# Patient Record
Sex: FEMALE | Race: WHITE | Employment: OTHER | URBAN - METROPOLITAN AREA
[De-identification: names, ages, dates, MRNs, and addresses within clinical notes are randomized per-mention and may not be internally consistent; named-entity substitution may affect disease eponyms.]

---

## 2017-01-09 RX ORDER — AMLODIPINE BESYLATE 5 MG/1
TABLET ORAL
Qty: 30 TAB | Refills: 0 | Status: SHIPPED | OUTPATIENT
Start: 2017-01-09 | End: 2017-02-16 | Stop reason: SDUPTHER

## 2017-01-13 ENCOUNTER — OFFICE VISIT (OUTPATIENT)
Dept: INTERNAL MEDICINE CLINIC | Age: 67
End: 2017-01-13

## 2017-01-13 VITALS
DIASTOLIC BLOOD PRESSURE: 86 MMHG | RESPIRATION RATE: 16 BRPM | WEIGHT: 129 LBS | BODY MASS INDEX: 25.32 KG/M2 | HEART RATE: 73 BPM | SYSTOLIC BLOOD PRESSURE: 160 MMHG | HEIGHT: 60 IN | OXYGEN SATURATION: 98 % | TEMPERATURE: 98 F

## 2017-01-13 DIAGNOSIS — R53.83 OTHER FATIGUE: ICD-10-CM

## 2017-01-13 DIAGNOSIS — I10 BENIGN ESSENTIAL HYPERTENSION: Primary | ICD-10-CM

## 2017-01-13 DIAGNOSIS — K12.0 APHTHOUS ULCER: ICD-10-CM

## 2017-01-13 RX ORDER — TRIAMCINOLONE ACETONIDE 1 MG/G
PASTE DENTAL 2 TIMES DAILY
Qty: 5 G | Refills: 0 | Status: SHIPPED | OUTPATIENT
Start: 2017-01-13 | End: 2017-05-16

## 2017-01-13 RX ORDER — LANOLIN ALCOHOL/MO/W.PET/CERES
5000 CREAM (GRAM) TOPICAL DAILY
COMMUNITY
End: 2019-01-21

## 2017-01-13 RX ORDER — LEVOMEFOLATE CALCIUM 15 MG
15 TABLET ORAL DAILY
COMMUNITY

## 2017-01-13 RX ORDER — MONTELUKAST SODIUM 4 MG/1
1 TABLET, CHEWABLE ORAL 2 TIMES DAILY
COMMUNITY

## 2017-01-13 RX ORDER — OMEPRAZOLE 20 MG/1
20 CAPSULE, DELAYED RELEASE ORAL DAILY
COMMUNITY
End: 2017-05-16

## 2017-01-13 RX ORDER — CYCLOBENZAPRINE HCL 10 MG
10 TABLET ORAL
COMMUNITY
End: 2017-05-16

## 2017-01-13 RX ORDER — AMLODIPINE BESYLATE 2.5 MG/1
2.5 TABLET ORAL DAILY
Qty: 30 TAB | Refills: 2 | Status: SHIPPED | OUTPATIENT
Start: 2017-01-13 | End: 2017-02-20

## 2017-01-13 NOTE — PROGRESS NOTES
Patient presents for   Chief Complaint   Patient presents with    Well Woman     4 month follow      Fall risk assessment was not indicated. Depression screening was not indicated Follow up questions were not indicated. 1. Have you been to the ER, urgent care clinic since your last visit? Hospitalized since your last visit? No    2. Have you seen or consulted any other health care providers outside of the [address] [place] [address] since your last visit? Include any pap smears or colon screening. No    Abuse screening performed no indications of abuse observed at this time. Learning assessment completed. ADL assessment completed.

## 2017-01-13 NOTE — ACP (ADVANCE CARE PLANNING)
Non-Provider Advance Care Planning (ACP) Note    Date of ACP Conversation: 1/13/2017  Persons included in Conversation: patient  Length of ACP Conversation in minutes: <15 minutes (Non-Billable)    Conversation requested by:   Provider      General ACP for ALL Patients with Decision Making Capacity:    Advance Directive Conversation with Patients who have not yet planned:  Importance of advance care planning, including choosing a healthcare agent to communicate patient's healthcare decisions if patient lost the ability to make decisions, such as after a sudden illness or accident  Reviewed healthcare agent role and authority/west  Recommended additional conversation with prospective agent      Interventions Provided:  Provided ACP educational materials:  Advance Directive Form  Recommended completion of Advance Directive after review of materials, conversation with prospective healthcare agent about (and others as needed), and readiness to complete a written plan  Referral to Provider for additional medical information/discussion

## 2017-01-13 NOTE — PROGRESS NOTES
Edwin Gutierres is a 74 y.o. female presenting today for Well Woman (4 month follow )  . HPI:  Edwin Gutierres presents to the office today for hypertension follow-up care. Patient states she is doing okay. Patient states she still has mouth ulcers. She was previously treated by ENT several months ago but states the medicine didn't work and she did not return to his office for f/u care. She denied any CP or palpitations today. Review of Systems   Constitutional: Negative for chills and fever. Respiratory: Negative for cough and shortness of breath. Cardiovascular: Negative for chest pain, palpitations and leg swelling. Gastrointestinal: Negative for abdominal pain, heartburn, nausea and vomiting. Neurological: Negative for dizziness. Allergies   Allergen Reactions    Morphine Unknown (comments)     Hallucinations  Pt. Denies allergy         Current Outpatient Prescriptions   Medication Sig Dispense Refill    omeprazole (PRILOSEC) 20 mg capsule Take 20 mg by mouth daily.  l-methylfolate (DEPLIN) 15 mg tablet Take 15 mg by mouth daily.  colestipol (COLESTID) 1 gram tablet Take 1 g by mouth two (2) times a day.  cyclobenzaprine (FLEXERIL) 10 mg tablet Take 10 mg by mouth nightly as needed for Muscle Spasm(s).  cyanocobalamin 1,000 mcg tablet Take 5,000 mcg by mouth daily.  amLODIPine (NORVASC) 2.5 mg tablet Take 1 Tab by mouth daily. 30 Tab 2    aluminum-magnesium hydroxide 200-200 mg/5 mL susp 30 mL, diphenhydrAMINE 12.5 mg/5 mL liqd 75 mg, lidocaine 2 % soln 30 mL, nystatin 100,000 unit/mL susp 3,000,000 Units oral suspension (compounded) Take 5 mL by mouth four (4) times daily. 240 mL 2    triamcinolone acetonide (KENALOG) 0.1 % dental paste by Dental route two (2) times a day. 5 g 0    amLODIPine (NORVASC) 5 mg tablet TAKE 1 TABLET BY MOUTH DAILY 30 Tab 0    LORazepam (ATIVAN) 0.5 mg tablet Take 0.5 Tabs by mouth two (2) times daily as needed.   1    azelastine (ASTELIN) 137 mcg (0.1 %) nasal spray 1 [place] by Both Nostrils route two (2) times a day. Use in each nostril as directed 1 Bottle 2    dicyclomine (BENTYL) 10 mg capsule   3    carvedilol (COREG) 12.5 mg tablet Take 1 Tab by mouth two (2) times daily (with meals). 60 Tab 5    zaleplon (SONATA) 10 mg capsule Take 1 Cap by mouth nightly. Max Daily Amount: 10 mg. Indications: SLEEP-ONSET INSOMNIA 30 Cap 2    oxyCODONE-acetaminophen (PERCOCET 10)  mg per tablet Take 1 Tab by mouth four (4) times daily as needed for Pain for up to 30 days. Due to fill 11/30/15. 120 Tab 0    MULTIVITAMIN PO Take  by mouth.  sertraline (ZOLOFT) 100 mg tablet Take 200 mg by mouth daily.  oxyCODONE-acetaminophen (PERCOCET 10)  mg per tablet Take 1 Tab by mouth four (4) times daily as needed for Pain for up to 30 days. Due to fill 10/25/16 120 Tab 0    oxyCODONE-acetaminophen (PERCOCET 10)  mg per tablet Take 1 Tab by mouth four (4) times daily as needed for Pain for up to 30 days. Due to fill 11/23/16 120 Tab 0    oxyCODONE-acetaminophen (PERCOCET 10)  mg per tablet Take 1 Tab by mouth four (4) times daily as needed for Pain for up to 30 days. Due to fill 12/22/16 120 Tab 0    acyclovir (ZOVIRAX) 400 mg tablet TK 1 T PO TID  1    oxyCODONE-acetaminophen (PERCOCET 10)  mg per tablet Take 1 Tab by mouth four (4) times daily as needed for Pain for up to 30 days.  ok to fill 04/28/16 120 Tab 0       Past Medical History   Diagnosis Date    Abdominal pain     Benign essential hypertension     Cancer (HCC)      uterine    Depressive disorder     Fatigue     Hypertension     Insomnia     NATHANAEL (obstructive sleep apnea)      does not use cpap machine    Osteoarthritis     Peptic ulcer 2012    Spinal stenosis      with chronic pain       Past Surgical History   Procedure Laterality Date    Hx appendectomy  1962    Hx milady and bso      Hx knee replacement  2005     right    Hx back surgery 1986     PHLD; L4-L5    Hx hysterectomy  1983     uterine cancer, stage 1    Hx gastric bypass  2009       Social History     Social History    Marital status:      Spouse name: N/A    Number of children: N/A    Years of education: N/A     Occupational History    Not on file. Social History Main Topics    Smoking status: Former Smoker     Packs/day: 0.25     Types: Cigarettes     Quit date: 12/1/2016    Smokeless tobacco: Never Used      Comment: 6 ciggerates per day    Alcohol use Yes      Comment: socially    Drug use: No      Comment: denies    Sexual activity: No     Other Topics Concern    Not on file     Social History Narrative       Patient does not have an advanced directive on file    Vitals:    01/13/17 0832 01/13/17 0909   BP: 148/76 160/86   Pulse: 73    Resp: 16    Temp: 98 °F (36.7 °C)    TempSrc: Tympanic    SpO2: 98%    Weight: 129 lb (58.5 kg)    Height: 5' (1.524 m)    PainSc:   5    PainLoc: Mouth        Physical Exam   Constitutional: No distress. HENT:   Head: Normocephalic. Cardiovascular: Normal rate, regular rhythm and normal heart sounds. Pulmonary/Chest: Effort normal and breath sounds normal.   Abdominal: Soft. Musculoskeletal: She exhibits no edema. Lymphadenopathy:     She has no cervical adenopathy. Neurological: She is alert. Nursing note and vitals reviewed. No visits with results within 3 Month(s) from this visit.   Latest known visit with results is:    Hospital Outpatient Visit on 06/28/2016   Component Date Value Ref Range Status    WBC 06/28/2016 7.6  4.6 - 13.2 K/uL Final    RBC 06/28/2016 4.27  4.20 - 5.30 M/uL Final    HGB 06/28/2016 13.2  12.0 - 16.0 g/dL Final    HCT 06/28/2016 41.2  35.0 - 45.0 % Final    MCV 06/28/2016 96.5  74.0 - 97.0 FL Final    MCH 06/28/2016 30.9  24.0 - 34.0 PG Final    MCHC 06/28/2016 32.0  31.0 - 37.0 g/dL Final    RDW 06/28/2016 13.5  11.6 - 14.5 % Final    PLATELET 84/75/4854 406  135 - 420 K/uL Final    MPV 06/28/2016 10.2  9.2 - 11.8 FL Final    NEUTROPHILS 06/28/2016 63  40 - 73 % Final    LYMPHOCYTES 06/28/2016 27  21 - 52 % Final    MONOCYTES 06/28/2016 8  3 - 10 % Final    EOSINOPHILS 06/28/2016 2  0 - 5 % Final    BASOPHILS 06/28/2016 0  0 - 2 % Final    ABS. NEUTROPHILS 06/28/2016 4.8  1.8 - 8.0 K/UL Final    ABS. LYMPHOCYTES 06/28/2016 2.1  0.9 - 3.6 K/UL Final    ABS. MONOCYTES 06/28/2016 0.6  0.05 - 1.2 K/UL Final    ABS. EOSINOPHILS 06/28/2016 0.2  0.0 - 0.4 K/UL Final    ABS. BASOPHILS 06/28/2016 0.0  0.0 - 0.06 K/UL Final    DF 06/28/2016 AUTOMATED    Final    Sodium 06/28/2016 141  136 - 145 mmol/L Final    Potassium 06/28/2016 4.1  3.5 - 5.5 mmol/L Final    Chloride 06/28/2016 105  100 - 108 mmol/L Final    CO2 06/28/2016 29  21 - 32 mmol/L Final    Anion gap 06/28/2016 7  3.0 - 18 mmol/L Final    Glucose 06/28/2016 102* 74 - 99 mg/dL Final    BUN 06/28/2016 12  7.0 - 18 MG/DL Final    Creatinine 06/28/2016 0.74  0.6 - 1.3 MG/DL Final    BUN/Creatinine ratio 06/28/2016 16  12 - 20   Final    GFR est AA 06/28/2016 >60  >60 ml/min/1.73m2 Final    GFR est non-AA 06/28/2016 >60  >60 ml/min/1.73m2 Final    Comment: (NOTE)  Estimated GFR is calculated using the Modification of Diet in Renal   Disease (MDRD) Study equation, reported for both  Americans   (GFRAA) and non- Americans (GFRNA), and normalized to 1.73m2   body surface area. The physician must decide which value applies to   the patient. The MDRD study equation should only be used in   individuals age 25 or older. It has not been validated for the   following: pregnant women, patients with serious comorbid conditions,   or on certain medications, or persons with extremes of body size,   muscle mass, or nutritional status.       Calcium 06/28/2016 8.5  8.5 - 10.1 MG/DL Final    Bilirubin, total 06/28/2016 0.4  0.2 - 1.0 MG/DL Final    ALT 06/28/2016 17  13 - 56 U/L Final    AST 06/28/2016 18  15 - 37 U/L Final    Alk. phosphatase 06/28/2016 71  45 - 117 U/L Final    Protein, total 06/28/2016 6.4  6.4 - 8.2 g/dL Final    Albumin 06/28/2016 3.5  3.4 - 5.0 g/dL Final    Globulin 06/28/2016 2.9  2.0 - 4.0 g/dL Final    A-G Ratio 06/28/2016 1.2  0.8 - 1.7   Final    TSH 06/28/2016 0.81  0.36 - 3.74 uIU/mL Final    LIPID PROFILE 06/28/2016        Final    Cholesterol, total 06/28/2016 177  <200 MG/DL Final    Triglyceride 06/28/2016 109  <150 MG/DL Final    HDL Cholesterol 06/28/2016 77* 40 - 60 MG/DL Final    LDL, calculated 06/28/2016 78.2  0 - 100 MG/DL Final    VLDL, calculated 06/28/2016 21.8  MG/DL Final    CHOL/HDL Ratio 06/28/2016 2.3  0 - 5.0   Final       .No results found for any visits on 01/13/17. Assessment / Plan:      ICD-10-CM ICD-9-CM    1. Benign essential hypertension D60 270.1 METABOLIC PANEL, COMPREHENSIVE      LIPID PANEL      CBC WITH AUTOMATED DIFF      amLODIPine (NORVASC) 2.5 mg tablet   2. Other fatigue U63.97 066.65 METABOLIC PANEL, COMPREHENSIVE      LIPID PANEL      CBC WITH AUTOMATED DIFF   3. Aphthous ulcer K12.0 528.2 triamcinolone acetonide (KENALOG) 0.1 % dental paste       Labs today  Triamcinolone mouth paste  Norvasc increased to 7.5 mg daily (b/p check in 3-4 weeks)  Fatigue with check CBC today  F/u prn  OV in 6 months      Follow-up Disposition:  Return in about 6 months (around 7/13/2017) for b/p check in 3-4 weeks. I asked the patient if she  had any questions and answered her  questions.   The patient stated that she understands the treatment plan and agrees with the treatment plan

## 2017-01-13 NOTE — MR AVS SNAPSHOT
Visit Information Date & Time Provider Department Dept. Phone Encounter #  
 1/13/2017  8:30 AM Aleksey Ellis NP [facility] INTERNAL MEDICINE [facility] [phone] [id number] Your Appointments 1/19/2017  8:20 AM  
Follow Up with ELI Agosto [facility] (MARIE SCHEDULING) Appt Note: return in 3 months [address] [id number]  
[phone] Lizeth Sneed [address] [id number] Upcoming Health Maintenance Date Due Hepatitis C Screening 1950 DTaP/Tdap/Td series (1 - Tdap) 5/28/1969 BREAST CANCER SCRN MAMMOGRAM 5/28/1998 ZOSTER VACCINE AGE 60> 5/28/2008 GLAUCOMA SCREENING Q2Y 5/28/2013 OSTEOPOROSIS SCREENING (DEXA) 5/28/2013 Pneumococcal 65+ Low/Medium Risk (2 of 2 - PPSV23) 9/12/2017 MEDICARE YEARLY EXAM 9/13/2017 COLONOSCOPY 4/26/2019 Allergies as of 1/13/2017  Review Complete On: 1/13/2017 By: Aleksey Ellis NP Severity Noted Reaction Type Reactions Morphine    Unknown (comments) Hallucinations Pt. Denies allergy Current Immunizations  Reviewed on 9/12/2016 Name Date Influenza High Dose Vaccine PF 9/12/2016 Pneumococcal Conjugate (PCV-13) 9/12/2016 Not reviewed this visit You Were Diagnosed With   
  
 Codes Comments Benign essential hypertension    -  Primary ICD-10-CM: I10 
ICD-9-CM: 401.1 Other fatigue     ICD-10-CM: R53.83 ICD-9-CM: 780.79 Aphthous ulcer     ICD-10-CM: K12.0 ICD-9-CM: 528.2 Vitals BP Pulse Temp Resp Height(growth percentile) Weight(growth percentile) 160/86 73 98 °F (36.7 °C) (Tympanic) 16 5' (1.524 m) 129 lb (58.5 kg) SpO2 BMI OB Status Smoking Status 98% 25.19 kg/m2 Hysterectomy Former Smoker Vitals History BMI and BSA Data Body Mass Index Body Surface Area  
 25.19 kg/m 2 1.57 m 2 Preferred Pharmacy Pharmacy Name Phone [facility]resa [facility] [address] [facility] [phone] Your Updated Medication List  
  
   
This list is accurate as of: 1/13/17  9:29 AM.  Always use your most recent med list.  
  
  
  
  
 acyclovir 400 mg tablet Commonly known as:  ZOVIRAX TK 1 T PO TID  
  
 aluminum-magnesium hydroxide 200-200 mg/5 mL susp 30 mL, diphenhydrAMINE 12.5 mg/5 mL liqd 75 mg, lidocaine 2 % soln 30 mL, nystatin 100,000 unit/mL susp 3,000,000 Units oral suspension (compounded) Take 5 mL by mouth four (4) times daily. * amLODIPine 5 mg tablet Commonly known as:  Tejon Citron TAKE 1 TABLET BY MOUTH DAILY  
  
 * amLODIPine 2.5 mg tablet Commonly known as:  Tejon Citron Take 1 Tab by mouth daily. azelastine 137 mcg (0.1 %) nasal spray Commonly known as:  ASTELIN  
1 Spray by Both Nostrils route two (2) times a day. Use in each nostril as directed  
  
 carvedilol 12.5 mg tablet Commonly known as:  Gianna Golas Take 1 Tab by mouth two (2) times daily (with meals). colestipol 1 gram tablet Commonly known as:  COLESTID Take 1 g by mouth two (2) times a day. cyanocobalamin 1,000 mcg tablet Take 5,000 mcg by mouth daily. cyclobenzaprine 10 mg tablet Commonly known as:  FLEXERIL Take 10 mg by mouth nightly as needed for Muscle Spasm(s). dicyclomine 10 mg capsule Commonly known as:  BENTYL  
  
 l-methylfolate 15 mg tablet Commonly known as:  [place] Take 15 mg by mouth daily. LORazepam 0.5 mg tablet Commonly known as:  ATIVAN Take 0.5 Tabs by mouth two (2) times daily as needed. MULTIVITAMIN PO Take  by mouth. omeprazole 20 mg capsule Commonly known as:  PRILOSEC Take 20 mg by mouth daily. * oxyCODONE-acetaminophen  mg per tablet Commonly known as:  PERCOCET 10 Take 1 Tab by mouth four (4) times daily as needed for Pain for up to 30 days. Due to fill 11/30/15. * oxyCODONE-acetaminophen  mg per tablet Commonly known as:  PERCOCET 10 Take 1 Tab by mouth four (4) times daily as needed for Pain for up to 30 days. ok to fill 04/28/16 * oxyCODONE-acetaminophen  mg per tablet Commonly known as:  PERCOCET 10 Take 1 Tab by mouth four (4) times daily as needed for Pain for up to 30 days. Due to fill 10/25/16 * oxyCODONE-acetaminophen  mg per tablet Commonly known as:  PERCOCET 10 Take 1 Tab by mouth four (4) times daily as needed for Pain for up to 30 days. Due to fill 11/23/16 * oxyCODONE-acetaminophen  mg per tablet Commonly known as:  PERCOCET 10 Take 1 Tab by mouth four (4) times daily as needed for Pain for up to 30 days. Due to fill 12/22/16  
  
 triamcinolone acetonide 0.1 % dental paste Commonly known as:  KENALOG  
by Dental route two (2) times a day. zaleplon 10 mg capsule Commonly known as:  SONATA Take 1 Cap by mouth nightly. Max Daily Amount: 10 mg. Indications: SLEEP-ONSET INSOMNIA  
  
 ZOLOFT 100 mg tablet Generic drug:  sertraline Take 200 mg by mouth daily. * Notice: This list has 7 medication(s) that are the same as other medications prescribed for you. Read the directions carefully, and ask your doctor or other care provider to review them with you. Prescriptions Printed Refills  
 aluminum-magnesium hydroxide 200-200 mg/5 mL susp 30 mL, diphenhydrAMINE 12.5 mg/5 mL liqd 75 mg, lidocaine 2 % soln 30 mL, nystatin 100,000 unit/mL susp 3,000,000 Units oral suspension (compounded) 2 Sig: Take 5 mL by mouth four (4) times daily. Class: Print Route: Oral  
  
Prescriptions Sent to Pharmacy Refills  
 amLODIPine (NORVASC) 2.5 mg tablet 2 Sig: Take 1 Tab by mouth daily. Class: Normal  
 Pharmacy: [facility] [address] [place], [place] [address]Tresa [address] [place] [address] #: [phone]  Route: Oral  
 triamcinolone acetonide (KENALOG) 0.1 % dental paste 0 Sig: by Dental route two (2) times a day. Class: Normal  
 Pharmacy: [facility] [address] [place] [address] Tresa Salamanca [address] [place] [address] #: [phone] Route: Dental  
  
To-Do List   
 01/13/2017 Lab:  CBC WITH AUTOMATED DIFF   
  
 01/13/2017 Lab:  LIPID PANEL   
  
 01/13/2017 Lab:  METABOLIC PANEL, COMPREHENSIVE Introducing \[facility]\"" & HEALTH SERVICES! José Kisha introduces [employer] patient portal. Now you can access parts of your medical record, email your doctor's office, and request medication refills online. 1. In your internet browser, go to [email or web address] Pitch 2. Click on the First Time User? Click Here link in the Sign In box. You will see the New Member Sign Up page. 3. Enter your [employer] Access Code exactly as it appears below. You will not need to use this code after youve completed the sign-up process. If you do not sign up before the expiration date, you must request a new code. · [employer] Access Code: [email or web address] 
Expires: 4/13/2017  9:29 AM 
 
4. Enter the last four digits of your Social Security Number (xxxx) and Date of Birth (mm/dd/yyyy) as indicated and click Submit. You will be taken to the next sign-up page. 5. Create a [employer] ID. This will be your [employer] login ID and cannot be changed, so think of one that is secure and easy to remember. 6. Create a [employer] password. You can change your password at any time. 7. Enter your Password Reset Question and Answer. This can be used at a later time if you forget your password. 8. Enter your e-mail address. You will receive e-mail notification when new information is available in [address] 9. Click Sign Up. You can now view and download portions of your medical record. 10. Click the Download Summary menu link to download a portable copy of your medical information. If you have questions, please visit the Frequently Asked Questions section of the [employer] website. Remember, [employer] is NOT to be used for urgent needs. For medical emergencies, dial 911. Now available from your iPhone and Android! Please provide this summary of care documentation to your next provider. Your primary care clinician is listed as Tiago Terry. If you have any questions after today's visit, please call [phone].

## 2017-01-19 ENCOUNTER — OFFICE VISIT (OUTPATIENT)
Dept: PAIN MANAGEMENT | Age: 67
End: 2017-01-19

## 2017-01-19 VITALS
HEIGHT: 60 IN | HEART RATE: 70 BPM | BODY MASS INDEX: 25.32 KG/M2 | DIASTOLIC BLOOD PRESSURE: 81 MMHG | SYSTOLIC BLOOD PRESSURE: 168 MMHG | WEIGHT: 129 LBS

## 2017-01-19 DIAGNOSIS — M54.2 NECK PAIN: ICD-10-CM

## 2017-01-19 DIAGNOSIS — F51.04 CHRONIC INSOMNIA: ICD-10-CM

## 2017-01-19 DIAGNOSIS — G89.29 INSOMNIA SECONDARY TO CHRONIC PAIN: ICD-10-CM

## 2017-01-19 DIAGNOSIS — K58.0 IRRITABLE BOWEL SYNDROME WITH DIARRHEA: ICD-10-CM

## 2017-01-19 DIAGNOSIS — K12.1 MOUTH ULCERS: ICD-10-CM

## 2017-01-19 DIAGNOSIS — M50.30 DEGENERATION OF CERVICAL INTERVERTEBRAL DISC: ICD-10-CM

## 2017-01-19 DIAGNOSIS — G89.29 CHRONIC LEFT-SIDED LOW BACK PAIN WITHOUT SCIATICA: ICD-10-CM

## 2017-01-19 DIAGNOSIS — M25.552 PAIN IN JOINT, PELVIC REGION AND THIGH, LEFT: ICD-10-CM

## 2017-01-19 DIAGNOSIS — M70.62 TROCHANTERIC BURSITIS OF LEFT HIP: ICD-10-CM

## 2017-01-19 DIAGNOSIS — M51.37 DEGENERATION OF LUMBAR OR LUMBOSACRAL INTERVERTEBRAL DISC: ICD-10-CM

## 2017-01-19 DIAGNOSIS — M54.50 CHRONIC LEFT-SIDED LOW BACK PAIN WITHOUT SCIATICA: ICD-10-CM

## 2017-01-19 DIAGNOSIS — F41.8 DEPRESSION WITH ANXIETY: ICD-10-CM

## 2017-01-19 DIAGNOSIS — G89.4 CHRONIC PAIN SYNDROME: Primary | ICD-10-CM

## 2017-01-19 DIAGNOSIS — G47.01 INSOMNIA SECONDARY TO CHRONIC PAIN: ICD-10-CM

## 2017-01-19 DIAGNOSIS — M54.2 CERVICALGIA: ICD-10-CM

## 2017-01-19 DIAGNOSIS — Z79.899 ENCOUNTER FOR LONG-TERM (CURRENT) USE OF HIGH-RISK MEDICATION: ICD-10-CM

## 2017-01-19 DIAGNOSIS — M96.1 POSTLAMINECTOMY SYNDROME, LUMBAR REGION: ICD-10-CM

## 2017-01-19 LAB
ALCOHOL UR POC: NORMAL
AMPHETAMINES UR POC: NORMAL
BARBITURATES UR POC: NORMAL
BENZODIAZEPINES UR POC: NORMAL
BUPRENORPHINE UR POC: NORMAL
CANNABINOIDS UR POC: NORMAL
CARISOPRODOL UR POC: NORMAL
COCAINE UR POC: NORMAL
FENTANYL UR POC: NORMAL
MDMA/ECSTASY UR POC: NORMAL
METHADONE UR POC: NORMAL
METHAMPHETAMINE UR POC: NORMAL
METHYLPHENIDATE UR POC: NORMAL
OPIATES UR POC: NORMAL
OXYCODONE UR POC: NORMAL
PHENCYCLIDINE UR POC: NORMAL
PROPOXYPHENE UR POC: NORMAL
TRAMADOL UR POC: NORMAL
TRICYCLICS UR POC: NORMAL

## 2017-01-19 RX ORDER — OXYCODONE AND ACETAMINOPHEN 10; 325 MG/1; MG/1
1 TABLET ORAL
Qty: 120 TAB | Refills: 0 | Status: SHIPPED | OUTPATIENT
Start: 2017-01-19 | End: 2017-02-22

## 2017-01-19 RX ORDER — CARVEDILOL 12.5 MG/1
TABLET ORAL
Qty: 60 TAB | Refills: 0 | Status: SHIPPED | OUTPATIENT
Start: 2017-01-19 | End: 2017-02-23 | Stop reason: SDUPTHER

## 2017-01-19 NOTE — MR AVS SNAPSHOT
Visit Information Date & Time Provider Department Dept. Phone Encounter #  
 1/19/2017  8:20 AM Emmanuel Brian, [address] for Pain Management [phone] [id number] Follow-up Instructions Return in about 3 months (around 4/19/2017). Your Appointments 5/12/2017  8:30 AM  
Follow Up with Jazzmine Barroso NP  
[facility] INTERNAL MEDICINE OF [place] ([facility]) Appt Note: 4 mos [address], [address]  
  
   
 [address], [address] [place] [address] Upcoming Health Maintenance Date Due Hepatitis C Screening 1950 DTaP/Tdap/Td series (1 - Tdap) 5/28/1969 BREAST CANCER SCRN MAMMOGRAM 5/28/1998 ZOSTER VACCINE AGE 60> 5/28/2008 GLAUCOMA SCREENING Q2Y 5/28/2013 OSTEOPOROSIS SCREENING (DEXA) 5/28/2013 Pneumococcal 65+ Low/Medium Risk (2 of 2 - PPSV23) 9/12/2017 MEDICARE YEARLY EXAM 9/13/2017 COLONOSCOPY 4/26/2019 Allergies as of 1/19/2017  Review Complete On: 1/19/2017 By: ELI Felder Severity Noted Reaction Type Reactions Morphine    Unknown (comments) Hallucinations Pt. Denies allergy Current Immunizations  Reviewed on 9/12/2016 Name Date Influenza High Dose Vaccine PF 9/12/2016 Pneumococcal Conjugate (PCV-13) 9/12/2016 Not reviewed this visit You Were Diagnosed With   
  
 Codes Comments Chronic pain syndrome    -  Primary ICD-10-CM: G89.4 ICD-9-CM: 338.4 Encounter for long-term (current) use of high-risk medication     ICD-10-CM: Z79.899 ICD-9-CM: V58.69 Postlaminectomy syndrome, lumbar region     ICD-10-CM: M96.1 ICD-9-CM: 722.83 Degeneration of lumbar or lumbosacral intervertebral disc     ICD-10-CM: M51.37 
ICD-9-CM: 722.52 Degeneration of cervical intervertebral disc     ICD-10-CM: M50.30 ICD-9-CM: 722.4  Insomnia secondary to chronic pain     ICD-10-CM: G89.29, G47.01 
 ICD-9-CM: 338.29, 327.01 Cervicalgia     ICD-10-CM: M54.2 ICD-9-CM: 723.1 Mouth ulcers     ICD-10-CM: K12.1 ICD-9-CM: 820. 9 Chronic left-sided low back pain without sciatica     ICD-10-CM: M54.5, G89.29 ICD-9-CM: 724.2, 338.29 Irritable bowel syndrome with diarrhea     ICD-10-CM: K58.0 ICD-9-CM: 936.7 Pain in joint, pelvic region and thigh, left     ICD-10-CM: M25.552 ICD-9-CM: 719.45 Trochanteric bursitis of left hip     ICD-10-CM: M70.62 ICD-9-CM: 726.5 Neck pain     ICD-10-CM: M54.2 ICD-9-CM: 723.1 Chronic insomnia     ICD-10-CM: F51.04 
ICD-9-CM: 780.52 Depression with anxiety     ICD-10-CM: F41.8 ICD-9-CM: 300.4 Vitals BP Pulse Height(growth percentile) Weight(growth percentile) BMI OB Status 168/81 70 5' (1.524 m) 129 lb (58.5 kg) 25.19 kg/m2 Hysterectomy Smoking Status Former Smoker Vitals History BMI and BSA Data Body Mass Index Body Surface Area  
 25.19 kg/m 2 1.57 m 2 Preferred Pharmacy Pharmacy Name Phone [facility] [address] [place] [address] Your Updated Medication List  
  
   
This list is accurate as of: 1/19/17  9:08 AM.  Always use your most recent med list.  
  
  
  
  
 acyclovir 400 mg tablet Commonly known as:  ZOVIRAX TK 1 T PO TID  
  
 aluminum-magnesium hydroxide 200-200 mg/5 mL susp 30 mL, diphenhydrAMINE 12.5 mg/5 mL liqd 75 mg, lidocaine 2 % soln 30 mL, nystatin 100,000 unit/mL susp 3,000,000 Units oral suspension (compounded) Take 5 mL by mouth four (4) times daily. * amLODIPine 5 mg tablet Commonly known as:  Lalit Alstrom TAKE 1 TABLET BY MOUTH DAILY  
  
 * amLODIPine 2.5 mg tablet Commonly known as:  Lalit Alstrom Take 1 Tab by mouth daily. azelastine 137 mcg (0.1 %) nasal spray Commonly known as:  ASTELIN  
1 Spray by Both Nostrils route two (2) times a day.  Use in each nostril as directed  
  
 carvedilol 12.5 mg tablet Commonly known as:  COREG  
TAKE 1 TABLET BY MOUTH TWICE DAILY WITH MEALS  
  
 colestipol 1 gram tablet Commonly known as:  COLESTID Take 1 g by mouth two (2) times a day. cyanocobalamin 1,000 mcg tablet Take 5,000 mcg by mouth daily. cyclobenzaprine 10 mg tablet Commonly known as:  FLEXERIL Take 10 mg by mouth nightly as needed for Muscle Spasm(s). dicyclomine 10 mg capsule Commonly known as:  BENTYL  
  
 l-methylfolate 15 mg tablet Commonly known as:  Viva Kast Take 15 mg by mouth daily. LORazepam 0.5 mg tablet Commonly known as:  ATIVAN Take 0.5 Tabs by mouth two (2) times daily as needed. MULTIVITAMIN PO Take  by mouth. omeprazole 20 mg capsule Commonly known as:  PRILOSEC Take 20 mg by mouth daily. * oxyCODONE-acetaminophen  mg per tablet Commonly known as:  PERCOCET 10 Take 1 Tab by mouth four (4) times daily as needed for Pain for up to 30 days. Due to fill 11/30/15. * oxyCODONE-acetaminophen  mg per tablet Commonly known as:  PERCOCET 10 Take 1 Tab by mouth four (4) times daily as needed for Pain for up to 30 days. ok to fill 04/28/16 * oxyCODONE-acetaminophen  mg per tablet Commonly known as:  PERCOCET 10 Take 1 Tab by mouth four (4) times daily as needed for Pain for up to 30 days. Due to fill 10/25/16 * oxyCODONE-acetaminophen  mg per tablet Commonly known as:  PERCOCET 10 Take 1 Tab by mouth four (4) times daily as needed for Pain for up to 30 days. Due to fill 11/23/16 * oxyCODONE-acetaminophen  mg per tablet Commonly known as:  PERCOCET 10 Take 1 Tab by mouth four (4) times daily as needed for Pain for up to 30 days. Due to fill 3/19/17 * oxyCODONE-acetaminophen  mg per tablet Commonly known as:  PERCOCET 10 Take 1 Tab by mouth four (4) times daily as needed for Pain for up to 30 days. OK to fill 2/19/17 * oxyCODONE-acetaminophen  mg per tablet Commonly known as:  PERCOCET 10 Take 1 Tab by mouth four (4) times daily as needed for Pain for up to 30 days. Due to fill 1/21/17  
  
 triamcinolone acetonide 0.1 % dental paste Commonly known as:  KENALOG  
by Dental route two (2) times a day. zaleplon 10 mg capsule Commonly known as:  SONATA Take 1 Cap by mouth nightly. Max Daily Amount: 10 mg. Indications: SLEEP-ONSET INSOMNIA  
  
 ZOLOFT 100 mg tablet Generic drug:  sertraline Take 200 mg by mouth daily. * Notice: This list has 9 medication(s) that are the same as other medications prescribed for you. Read the directions carefully, and ask your doctor or other care provider to review them with you. Prescriptions Printed Refills  
 oxyCODONE-acetaminophen (PERCOCET 10)  mg per tablet 0 Sig: Take 1 Tab by mouth four (4) times daily as needed for Pain for up to 30 days. Due to fill 3/19/17 Class: Print Route: Oral  
 oxyCODONE-acetaminophen (PERCOCET 10)  mg per tablet 0 Sig: Take 1 Tab by mouth four (4) times daily as needed for Pain for up to 30 days. OK to fill 2/19/17 Class: Print Route: Oral  
 oxyCODONE-acetaminophen (PERCOCET 10)  mg per tablet 0 Sig: Take 1 Tab by mouth four (4) times daily as needed for Pain for up to 30 days. Due to fill 1/21/17 Class: Print Route: Oral  
  
We Performed the Following AMB POC DRUG SCREEN () [ [facility]] DRUG SCREEN [BUP70683 Custom] Follow-up Instructions Return in about 3 months (around 4/19/2017). Patient Instructions 1. Continue medications as prescribed 2. Follow up in three months Introducing [facility]! [facility] introduces [employer] patient portal. Now you can access parts of your medical record, email your doctor's office, and request medication refills online.    
 
1. In your internet browser, go to [email or web address] 2. Click on the First Time User? Click Here link in the Sign In box. You will see the New Member Sign Up page. 3. Enter your [employer] Access Code exactly as it appears below. You will not need to use this code after youve completed the sign-up process. If you do not sign up before the expiration date, you must request a new code. · [employer] Access Code: [email or web address] 
Expires: 4/13/2017  9:29 AM 
 
4. Enter the last four digits of your Social Security Number (xxxx) and Date of Birth (mm/dd/yyyy) as indicated and click Submit. You will be taken to the next sign-up page. 5. Create a eNeura Therapeuticst ID. This will be your [employer] login ID and cannot be changed, so think of one that is secure and easy to remember. 6. Create a [employer] password. You can change your password at any time. 7. Enter your Password Reset Question and Answer. This can be used at a later time if you forget your password. 8. Enter your e-mail address. You will receive e-mail notification when new information is available in [address] 9. Click Sign Up. You can now view and download portions of your medical record. 10. Click the Download Summary menu link to download a portable copy of your medical information. If you have questions, please visit the Frequently Asked Questions section of the [employer] website. Remember, [employer] is NOT to be used for urgent needs. For medical emergencies, dial 911. Now available from your iPhone and Android! Please provide this summary of care documentation to your next provider. Your primary care clinician is listed as Cecilia Ta. If you have any questions after today's visit, please call [phone].

## 2017-01-19 NOTE — PROGRESS NOTES
Nursing Notes    Patient presents to the office today in follow-up. Patient rates her pain at 6/10 on the numerical pain scale. Reviewed medications with counts as follows:    Rx Date filled Qty Dispensed Pill Count Last Dose Short   Percocet 10/325 mg 12/23/16 120 44 yesterday no                                   POC UDS was performed in office today. Any new labs or imaging since last appointment? NO    Have you been to an emergency room (ER) or urgent care clinic since your last visit? NO            Have you been hospitalized since your last visit? NO     If yes, where, when, and reason for visit? Have you seen or consulted any other health care providers outside of the [address] [place] [address]  since your last visit? NO     If yes, where, when, and reason for visit? HM deferred to pcp.

## 2017-02-16 RX ORDER — AMLODIPINE BESYLATE 5 MG/1
TABLET ORAL
Qty: 90 TAB | Refills: 0 | Status: SHIPPED | OUTPATIENT
Start: 2017-02-16 | End: 2017-05-15 | Stop reason: SDUPTHER

## 2017-02-20 ENCOUNTER — HOSPITAL ENCOUNTER (OUTPATIENT)
Dept: CT IMAGING | Age: 67
Discharge: HOME | End: 2017-02-20
Attending: NURSE PRACTITIONER
Payer: MEDICARE

## 2017-02-20 ENCOUNTER — OFFICE VISIT (OUTPATIENT)
Dept: INTERNAL MEDICINE CLINIC | Age: 67
End: 2017-02-20

## 2017-02-20 VITALS
HEIGHT: 60 IN | OXYGEN SATURATION: 98 % | DIASTOLIC BLOOD PRESSURE: 90 MMHG | RESPIRATION RATE: 16 BRPM | BODY MASS INDEX: 25.13 KG/M2 | TEMPERATURE: 99 F | WEIGHT: 128 LBS | SYSTOLIC BLOOD PRESSURE: 182 MMHG | HEART RATE: 68 BPM

## 2017-02-20 DIAGNOSIS — R10.32 LLQ PAIN: ICD-10-CM

## 2017-02-20 DIAGNOSIS — R10.84 GENERALIZED ABDOMINAL PAIN: ICD-10-CM

## 2017-02-20 DIAGNOSIS — I10 BENIGN ESSENTIAL HYPERTENSION: ICD-10-CM

## 2017-02-20 DIAGNOSIS — K57.92 DIVERTICULITIS OF INTESTINE WITHOUT PERFORATION OR ABSCESS WITHOUT BLEEDING, UNSPECIFIED PART OF INTESTINAL TRACT: Primary | ICD-10-CM

## 2017-02-20 LAB
BILIRUB UR QL STRIP: NORMAL
CREAT UR-MCNC: 0.7 MG/DL (ref 0.6–1.3)
GLUCOSE UR-MCNC: NORMAL MG/DL
KETONES P FAST UR STRIP-MCNC: NORMAL MG/DL
PH UR STRIP: 5.5 [PH] (ref 4.6–8)
PROT UR QL STRIP: NORMAL MG/DL
SP GR UR STRIP: 1.03 (ref 1–1.03)
UA UROBILINOGEN AMB POC: NORMAL (ref 0.2–1)
URINALYSIS CLARITY POC: CLEAR
URINALYSIS COLOR POC: NORMAL
URINE BLOOD POC: NEGATIVE
URINE LEUKOCYTES POC: NEGATIVE
URINE NITRITES POC: NEGATIVE

## 2017-02-20 PROCEDURE — 82565 ASSAY OF CREATININE: CPT

## 2017-02-20 PROCEDURE — 74177 CT ABD & PELVIS W/CONTRAST: CPT

## 2017-02-20 PROCEDURE — 74011636320 HC RX REV CODE- 636/320: Performed by: NURSE PRACTITIONER

## 2017-02-20 RX ORDER — CLONIDINE HYDROCHLORIDE 0.1 MG/1
0.1 TABLET ORAL 2 TIMES DAILY
Qty: 60 TAB | Refills: 0 | Status: SHIPPED | OUTPATIENT
Start: 2017-02-20 | End: 2017-02-20 | Stop reason: SDUPTHER

## 2017-02-20 RX ORDER — CLONIDINE HYDROCHLORIDE 0.1 MG/1
TABLET ORAL
Qty: 180 TAB | Refills: 0 | Status: SHIPPED | OUTPATIENT
Start: 2017-02-20 | End: 2017-06-17 | Stop reason: SDUPTHER

## 2017-02-20 RX ORDER — METRONIDAZOLE 500 MG/1
500 TABLET ORAL 3 TIMES DAILY
Qty: 30 TAB | Refills: 0 | Status: SHIPPED | OUTPATIENT
Start: 2017-02-20 | End: 2017-03-02

## 2017-02-20 RX ORDER — CIPROFLOXACIN 500 MG/1
500 TABLET ORAL 2 TIMES DAILY
Qty: 20 TAB | Refills: 0 | Status: SHIPPED | OUTPATIENT
Start: 2017-02-20 | End: 2017-03-02

## 2017-02-20 RX ADMIN — IOPAMIDOL 100 ML: 612 INJECTION, SOLUTION INTRAVENOUS at 17:59

## 2017-02-20 RX ADMIN — DIATRIZOATE MEGLUMINE AND DIATRIZOATE SODIUM 30 ML: 600; 100 SOLUTION ORAL; RECTAL at 17:59

## 2017-02-20 NOTE — MR AVS SNAPSHOT
Visit Information Date & Time Provider Department Dept. Phone Encounter #  
 2/20/2017  2:15 PM Carmel Crzu NP [facility] INTERNAL MEDICINE OF [address] [id number] Follow-up Instructions Return in about 2 weeks (around 3/6/2017). Your Appointments 4/13/2017  8:20 AM  
Follow Up with ELI Piña [facility] for Pain Management (MARIE SCHEDULING) Appt Note: return in 3 months [address] [facility] [address]  
[phone]  [phone] 5/12/2017  8:30 AM  
Follow Up with Carmel Cruz NP  
[facility] INTERNAL MEDICINE OF [place] ([facility]) Appt Note: 4 mos [address] [facility] [facility] [address]  
  
   
 [address] [facility], [address] [facility] [address] Upcoming Health Maintenance Date Due Hepatitis C Screening 1950 DTaP/Tdap/Td series (1 - Tdap) 5/28/1969 BREAST CANCER SCRN MAMMOGRAM 5/28/1998 ZOSTER VACCINE AGE 60> 5/28/2008 GLAUCOMA SCREENING Q2Y 5/28/2013 OSTEOPOROSIS SCREENING (DEXA) 5/28/2013 Pneumococcal 65+ Low/Medium Risk (2 of 2 - PPSV23) 9/12/2017 MEDICARE YEARLY EXAM 9/13/2017 COLONOSCOPY 4/26/2019 Allergies as of 2/20/2017  Review Complete On: 2/20/2017 By: Carmel Cruz NP Severity Noted Reaction Type Reactions Morphine    Unknown (comments) Hallucinations Pt. Denies allergy Current Immunizations  Reviewed on 9/12/2016 Name Date Influenza High Dose Vaccine PF 9/12/2016 Pneumococcal Conjugate (PCV-13) 9/12/2016 Not reviewed this visit You Were Diagnosed With   
  
 Codes Comments Diverticulitis of intestine without perforation or abscess without bleeding, unspecified part of intestinal tract    -  Primary ICD-10-CM: K57.92 
ICD-9-CM: 562.11 Generalized abdominal pain     ICD-10-CM: R10.84 ICD-9-CM: 789.07   
 Benign essential hypertension     ICD-10-CM: I10 
ICD-9-CM: 401.1 LLQ pain     ICD-10-CM: R10.32 
ICD-9-CM: 789.04 Vitals BP Pulse Temp Resp Height(growth percentile) Weight(growth percentile) 182/90 (BP 1 Location: Left arm, BP Patient Position: Sitting) 68 99 °F (37.2 °C) (Tympanic) 16 5' (1.524 m) 128 lb (58.1 kg) SpO2 BMI OB Status Smoking Status 98% 25 kg/m2 Hysterectomy Former Smoker BMI and BSA Data Body Mass Index Body Surface Area  
 25 kg/m 2 1.57 m 2 Preferred Pharmacy Pharmacy Name Phone [facility] [address] [place] [address] [phone] Your Updated Medication List  
  
   
This list is accurate as of: 2/20/17  3:04 PM.  Always use your most recent med list.  
  
  
  
  
 acyclovir 400 mg tablet Commonly known as:  ZOVIRAX TK 1 T PO TID  
  
 aluminum-magnesium hydroxide 200-200 mg/5 mL susp 30 mL, diphenhydrAMINE 12.5 mg/5 mL liqd 75 mg, lidocaine 2 % soln 30 mL, nystatin 100,000 unit/mL susp 3,000,000 Units oral suspension (compounded) Take 5 mL by mouth four (4) times daily. amLODIPine 5 mg tablet Commonly known as:  Adeline Colon TAKE 1 TABLET BY MOUTH DAILY  
  
 azelastine 137 mcg (0.1 %) nasal spray Commonly known as:  ASTELIN  
1 Spray by Both Nostrils route two (2) times a day. Use in each nostril as directed  
  
 carvedilol 12.5 mg tablet Commonly known as:  COREG  
TAKE 1 TABLET BY MOUTH TWICE DAILY WITH MEALS  
  
 ciprofloxacin HCl 500 mg tablet Commonly known as:  CIPRO Take 1 Tab by mouth two (2) times a day for 10 days. cloNIDine HCl 0.1 mg tablet Commonly known as:  CATAPRES Take 1 Tab by mouth two (2) times a day. colestipol 1 gram tablet Commonly known as:  COLESTID Take 1 g by mouth two (2) times a day. cyanocobalamin 1,000 mcg tablet Take 5,000 mcg by mouth daily. cyclobenzaprine 10 mg tablet Commonly known as:  FLEXERIL Take 10 mg by mouth nightly as needed for Muscle Spasm(s). dicyclomine 10 mg capsule Commonly known as:  BENTYL  
  
 l-methylfolate 15 mg tablet Commonly known as:  Cecile Simmers Take 15 mg by mouth daily. LORazepam 0.5 mg tablet Commonly known as:  ATIVAN Take 0.5 Tabs by mouth two (2) times daily as needed. metroNIDAZOLE 500 mg tablet Commonly known as:  FLAGYL Take 1 Tab by mouth three (3) times daily for 10 days. MULTIVITAMIN PO Take  by mouth. omeprazole 20 mg capsule Commonly known as:  PRILOSEC Take 20 mg by mouth daily. * oxyCODONE-acetaminophen  mg per tablet Commonly known as:  PERCOCET 10 Take 1 Tab by mouth four (4) times daily as needed for Pain for up to 30 days. Due to fill 11/30/15. * oxyCODONE-acetaminophen  mg per tablet Commonly known as:  PERCOCET 10 Take 1 Tab by mouth four (4) times daily as needed for Pain for up to 30 days. ok to fill 04/28/16 * oxyCODONE-acetaminophen  mg per tablet Commonly known as:  PERCOCET 10 Take 1 Tab by mouth four (4) times daily as needed for Pain for up to 30 days. Due to fill 10/25/16 * oxyCODONE-acetaminophen  mg per tablet Commonly known as:  PERCOCET 10 Take 1 Tab by mouth four (4) times daily as needed for Pain for up to 30 days. Due to fill 11/23/16 * oxyCODONE-acetaminophen  mg per tablet Commonly known as:  PERCOCET 10 Take 1 Tab by mouth four (4) times daily as needed for Pain for up to 30 days. Due to fill 3/19/17 * oxyCODONE-acetaminophen  mg per tablet Commonly known as:  PERCOCET 10 Take 1 Tab by mouth four (4) times daily as needed for Pain for up to 30 days. OK to fill 2/19/17 * oxyCODONE-acetaminophen  mg per tablet Commonly known as:  PERCOCET 10 Take 1 Tab by mouth four (4) times daily as needed for Pain for up to 30 days. Due to fill 1/21/17 triamcinolone acetonide 0.1 % dental paste Commonly known as:  KENALOG  
by Dental route two (2) times a day. zaleplon 10 mg capsule Commonly known as:  SONATA Take 1 Cap by mouth nightly. Max Daily Amount: 10 mg. Indications: SLEEP-ONSET INSOMNIA  
  
 ZOLOFT 100 mg tablet Generic drug:  sertraline Take 200 mg by mouth daily. * Notice: This list has 7 medication(s) that are the same as other medications prescribed for you. Read the directions carefully, and ask your doctor or other care provider to review them with you. Prescriptions Sent to Pharmacy Refills  
 ciprofloxacin HCl (CIPRO) 500 mg tablet 0 Sig: Take 1 Tab by mouth two (2) times a day for 10 days. Class: Normal  
 Pharmacy: [address] Ph #: [phone] Route: Oral  
 metroNIDAZOLE (FLAGYL) 500 mg tablet 0 Sig: Take 1 Tab by mouth three (3) times daily for 10 days. Class: Normal  
 Pharmacy: [address] Ph #: [phone] Route: Oral  
 cloNIDine HCl (CATAPRES) 0.1 mg tablet 0 Sig: Take 1 Tab by mouth two (2) times a day. Class: Normal  
 Pharmacy: [address] Ph #: [phone] Route: Oral  
  
We Performed the Following AMB POC URINALYSIS DIP STICK AUTO W/O MICRO [70311 CPT(R)] Follow-up Instructions Return in about 2 weeks (around 3/6/2017). To-Do List   
 02/20/2017 4:30 PM  
  Appointment with [facility] RM 2 at [facility] RAD [address] ([phone]) ORAL CONTRAST/PREP   Oral Contrast/Prep from radiology  no later than one day prior to study date/time.   DIET RESTRICTIONS  Nothing to eat or drink, 4 hours prior to study  May have water to take meds  May drink oral contrast if directed  GENERAL INSTRUCTIONS  If you were given premedications for IV contrast to take prior to having your study, please arrange to have someone drive you to your appointment. If you have had a creatinine level drawn within the past 30 days, please bring most recent results to your appt. MEDICATIONS  Bring a complete list of all medications you are currently taking to include prescriptions, over-the-counter meds, herbals, vitamins & any dietary supplements. RELATED STUDY INFORMATION  Bring any films, CDs, and reports related with you on the day of your exam.  This only includes studies done outside of [address] [place] [address], [facility], [facility], and [facility]. QUESTIONS  Notify the CT Department if you have any questions concerning your study. [facility] - [phone] [facility] 759 - [phone]  
  
 02/21/2017 Imaging:  CT ABD PELV W WO CONT Introducing [facility]! [facility] introduces [employer] patient portal. Now you can access parts of your medical record, email your doctor's office, and request medication refills online. 1. In your internet browser, go to [email or web address] 2. Click on the First Time User? Click Here link in the Sign In box. You will see the New Member Sign Up page. 3. Enter your [employer] Access Code exactly as it appears below. You will not need to use this code after youve completed the sign-up process. If you do not sign up before the expiration date, you must request a new code. · [employer] Access Code: [email or web address] 
Expires: 4/13/2017  9:29 AM 
 
4. Enter the last four digits of your Social Security Number (xxxx) and Date of Birth (mm/dd/yyyy) as indicated and click Submit. You will be taken to the next sign-up page. 5. Create a Telelogost ID. This will be your [employer] login ID and cannot be changed, so think of one that is secure and easy to remember. 6. Create a [employer] password. You can change your password at any time. 7. Enter your Password Reset Question and Answer. This can be used at a later time if you forget your password. 8. Enter your e-mail address. You will receive e-mail notification when new information is available in [address] 9. Click Sign Up. You can now view and download portions of your medical record. 10. Click the Download Summary menu link to download a portable copy of your medical information. If you have questions, please visit the Frequently Asked Questions section of the [employer] website. Remember, [employer] is NOT to be used for urgent needs. For medical emergencies, dial 911. Now available from your iPhone and Android! Please provide this summary of care documentation to your next provider. Your primary care clinician is listed as Rue Signs. If you have any questions after today's visit, please call [phone].

## 2017-02-20 NOTE — PROGRESS NOTES
Shari Carter is a 74 y.o. female presenting today for Abdominal Pain (x4 days) and Back Pain  . HPI:  Shari Carter presents to the office today for left quadrant pain which radiates to the epigastric region and left flank area x 4 days. Patient states the pain is so bad it makes her vomit. Her pain is a 8/10. Review of Systems   Respiratory: Negative for cough. Cardiovascular: Negative for chest pain, palpitations and leg swelling. Gastrointestinal: Positive for abdominal pain and vomiting. Allergies   Allergen Reactions    Morphine Unknown (comments)     Hallucinations  Pt. Denies allergy         Current Outpatient Prescriptions   Medication Sig Dispense Refill    ciprofloxacin HCl (CIPRO) 500 mg tablet Take 1 Tab by mouth two (2) times a day for 10 days. 20 Tab 0    metroNIDAZOLE (FLAGYL) 500 mg tablet Take 1 Tab by mouth three (3) times daily for 10 days. 30 Tab 0    cloNIDine HCl (CATAPRES) 0.1 mg tablet Take 1 Tab by mouth two (2) times a day. 60 Tab 0    amLODIPine (NORVASC) 5 mg tablet TAKE 1 TABLET BY MOUTH DAILY 90 Tab 0    carvedilol (COREG) 12.5 mg tablet TAKE 1 TABLET BY MOUTH TWICE DAILY WITH MEALS 60 Tab 0    omeprazole (PRILOSEC) 20 mg capsule Take 20 mg by mouth daily.  l-methylfolate (DEPLIN) 15 mg tablet Take 15 mg by mouth daily.  colestipol (COLESTID) 1 gram tablet Take 1 g by mouth two (2) times a day.  cyanocobalamin 1,000 mcg tablet Take 5,000 mcg by mouth daily.  LORazepam (ATIVAN) 0.5 mg tablet Take 0.5 Tabs by mouth two (2) times daily as needed. 1    dicyclomine (BENTYL) 10 mg capsule   3    zaleplon (SONATA) 10 mg capsule Take 1 Cap by mouth nightly. Max Daily Amount: 10 mg. Indications: SLEEP-ONSET INSOMNIA 30 Cap 2    MULTIVITAMIN PO Take  by mouth.  sertraline (ZOLOFT) 100 mg tablet Take 200 mg by mouth daily.       oxyCODONE-acetaminophen (PERCOCET 10)  mg per tablet Take 1 Tab by mouth four (4) times daily as needed for Pain for up to 30 days. Due to fill 3/19/17 120 Tab 0    oxyCODONE-acetaminophen (PERCOCET 10)  mg per tablet Take 1 Tab by mouth four (4) times daily as needed for Pain for up to 30 days. OK to fill 2/19/17 120 Tab 0    oxyCODONE-acetaminophen (PERCOCET 10)  mg per tablet Take 1 Tab by mouth four (4) times daily as needed for Pain for up to 30 days. Due to fill 1/21/17 120 Tab 0    cyclobenzaprine (FLEXERIL) 10 mg tablet Take 10 mg by mouth nightly as needed for Muscle Spasm(s).  aluminum-magnesium hydroxide 200-200 mg/5 mL susp 30 mL, diphenhydrAMINE 12.5 mg/5 mL liqd 75 mg, lidocaine 2 % soln 30 mL, nystatin 100,000 unit/mL susp 3,000,000 Units oral suspension (compounded) Take 5 mL by mouth four (4) times daily. 240 mL 2    triamcinolone acetonide (KENALOG) 0.1 % dental paste by Dental route two (2) times a day. 5 g 0    oxyCODONE-acetaminophen (PERCOCET 10)  mg per tablet Take 1 Tab by mouth four (4) times daily as needed for Pain for up to 30 days. Due to fill 10/25/16 120 Tab 0    oxyCODONE-acetaminophen (PERCOCET 10)  mg per tablet Take 1 Tab by mouth four (4) times daily as needed for Pain for up to 30 days. Due to fill 11/23/16 120 Tab 0    acyclovir (ZOVIRAX) 400 mg tablet TK 1 T PO TID  1    azelastine (ASTELIN) 137 mcg (0.1 %) nasal spray 1 Coello by Both Nostrils route two (2) times a day. Use in each nostril as directed 1 Bottle 2    oxyCODONE-acetaminophen (PERCOCET 10)  mg per tablet Take 1 Tab by mouth four (4) times daily as needed for Pain for up to 30 days. ok to fill 04/28/16 120 Tab 0    oxyCODONE-acetaminophen (PERCOCET 10)  mg per tablet Take 1 Tab by mouth four (4) times daily as needed for Pain for up to 30 days.  Due to fill 11/30/15. 120 Tab 0       Past Medical History   Diagnosis Date    Abdominal pain     Benign essential hypertension     Cancer (HCC)      uterine    Depressive disorder     Fatigue     Hypertension     Insomnia  NATHANAEL (obstructive sleep apnea)      does not use cpap machine    Osteoarthritis     Peptic ulcer 2012    Spinal stenosis      with chronic pain       Past Surgical History   Procedure Laterality Date    Hx appendectomy  1962    Hx milady and bso      Hx knee replacement  2005     right    Hx back surgery  1986     PHLD; L4-L5    Hx hysterectomy  1983     uterine cancer, stage 1    Hx gastric bypass  2009       Social History     Social History    Marital status:      Spouse name: N/A    Number of children: N/A    Years of education: N/A     Occupational History    Not on file. Social History Main Topics    Smoking status: Former Smoker     Packs/day: 0.25     Types: Cigarettes     Quit date: 12/1/2016    Smokeless tobacco: Never Used      Comment: 6 ciggerates per day    Alcohol use Yes      Comment: socially    Drug use: No      Comment: denies    Sexual activity: No     Other Topics Concern    Not on file     Social History Narrative       Patient does not have an advanced directive on file    Vitals:    02/20/17 1420   BP: 182/90   Pulse: 68   Resp: 16   Temp: 99 °F (37.2 °C)   TempSrc: Tympanic   SpO2: 98%   Weight: 128 lb (58.1 kg)   Height: 5' (1.524 m)   PainSc:   8   PainLoc: Abdomen       Physical Exam   Constitutional: She appears distressed. Cardiovascular: Normal rate, regular rhythm and normal heart sounds. No murmur heard. Pulmonary/Chest: Effort normal and breath sounds normal.   Abdominal: Soft. She exhibits no mass. There is tenderness (LLQ). There is guarding (Llw). Lymphadenopathy:     She has no cervical adenopathy. Neurological: She is alert.        Office Visit on 02/20/2017   Component Date Value Ref Range Status    Color (UA POC) 02/20/2017 Bea   Final    Clarity (UA POC) 02/20/2017 Clear   Final    Glucose (UA POC) 02/20/2017 Trace  Negative Final    Bilirubin (UA POC) 02/20/2017 1+  Negative Final    Ketones (UA POC) 02/20/2017 Trace  Negative Final    Specific gravity (UA POC) 02/20/2017 1.030  1.001 - 1.035 Final    Blood (UA POC) 02/20/2017 Negative  Negative Final    pH (UA POC) 02/20/2017 5.5  4.6 - 8.0 Final    Protein (UA POC) 02/20/2017 1+  Negative mg/dL Final    Urobilinogen (UA POC) 02/20/2017 1 mg/dL  0.2 - 1 Final    Nitrites (UA POC) 02/20/2017 Negative  Negative Final    Leukocyte esterase (UA POC) 02/20/2017 Negative  Negative Final   Office Visit on 01/19/2017   Component Date Value Ref Range Status    OXYCODONE UR POC 01/19/2017 Presumptive Positive   Final    BENZODIAZEPINES UR POC 01/19/2017 Presumptive Positive   Final       .  Results for orders placed or performed in visit on 02/20/17   AMB POC URINALYSIS DIP STICK AUTO W/O MICRO   Result Value Ref Range    Color (UA POC) Bea     Clarity (UA POC) Clear     Glucose (UA POC) Trace Negative    Bilirubin (UA POC) 1+ Negative    Ketones (UA POC) Trace Negative    Specific gravity (UA POC) 1.030 1.001 - 1.035    Blood (UA POC) Negative Negative    pH (UA POC) 5.5 4.6 - 8.0    Protein (UA POC) 1+ Negative mg/dL    Urobilinogen (UA POC) 1 mg/dL 0.2 - 1    Nitrites (UA POC) Negative Negative    Leukocyte esterase (UA POC) Negative Negative       Assessment / Plan:      ICD-10-CM ICD-9-CM    1. Diverticulitis of intestine without perforation or abscess without bleeding, unspecified part of intestinal tract K57.92 562.11 ciprofloxacin HCl (CIPRO) 500 mg tablet      metroNIDAZOLE (FLAGYL) 500 mg tablet   2. Generalized abdominal pain R10.84 789.07 AMB POC URINALYSIS DIP STICK AUTO W/O MICRO      ciprofloxacin HCl (CIPRO) 500 mg tablet      metroNIDAZOLE (FLAGYL) 500 mg tablet   3. Benign essential hypertension I10 401.1 cloNIDine HCl (CATAPRES) 0.1 mg tablet   4.  LLQ pain R10.32 789.04 CT ABD PELV W WO CONT     Flagyl rx  Cipro rx  Clonidine rx  CT of Abd/Pelvic rx  Per patient B/p has been elevated at home- systolic > 081   F/u in 2 weeks for B/p      Due to your diagnosis this Quinolone is recommended by me as part of the treatment plan for [facility]. Lucas Trinh. I explained to [facility]. Lucas Trinh that the FDA has placed a \"black box warning\" regarding this medication due a risk of tendon injury or rupture due to this medication and a risk of mental confusion. I explained that it is my professional opinion that the potential benefit of this Quinolone to treat the diverticulitis outweighs the risk of tendon injury or confusion. For this illness the other therapeutic options of different antibiotics were explained. These options avoid the risk of tendon rupture but have the potential drawback of not treating the diverticulitis. The [facility]. Enrike's questions were answered. Joanne Henriquez Enrikestated that she understands the issues and agrees to take the Quinolone. The patient was advised to limit physical activity although it was explained that a tendon injury may still occur. The patient was advised to discontinue the Quinolone and to call me if pain in her tendons or confusion. Follow-up Disposition:  Return in about 2 weeks (around 3/6/2017). I asked the patient if she  had any questions and answered her  questions.   The patient stated that she understands the treatment plan and agrees with the treatment plan

## 2017-02-22 ENCOUNTER — HOSPITAL ENCOUNTER (EMERGENCY)
Age: 67
Discharge: HOME | End: 2017-02-22
Attending: EMERGENCY MEDICINE
Payer: MEDICARE

## 2017-02-22 ENCOUNTER — TELEPHONE (OUTPATIENT)
Dept: INTERNAL MEDICINE CLINIC | Age: 67
End: 2017-02-22

## 2017-02-22 ENCOUNTER — APPOINTMENT (OUTPATIENT)
Dept: CT IMAGING | Age: 67
End: 2017-02-22
Attending: EMERGENCY MEDICINE
Payer: MEDICARE

## 2017-02-22 VITALS
DIASTOLIC BLOOD PRESSURE: 64 MMHG | WEIGHT: 124 LBS | HEIGHT: 60 IN | TEMPERATURE: 98.2 F | OXYGEN SATURATION: 96 % | HEART RATE: 73 BPM | RESPIRATION RATE: 18 BRPM | BODY MASS INDEX: 24.35 KG/M2 | SYSTOLIC BLOOD PRESSURE: 147 MMHG

## 2017-02-22 DIAGNOSIS — K58.0 IRRITABLE BOWEL SYNDROME WITH DIARRHEA: ICD-10-CM

## 2017-02-22 DIAGNOSIS — M51.37 DEGENERATION OF LUMBAR OR LUMBOSACRAL INTERVERTEBRAL DISC: ICD-10-CM

## 2017-02-22 DIAGNOSIS — M96.1 POSTLAMINECTOMY SYNDROME, LUMBAR REGION: ICD-10-CM

## 2017-02-22 DIAGNOSIS — G89.29 CHRONIC LEFT-SIDED LOW BACK PAIN WITHOUT SCIATICA: ICD-10-CM

## 2017-02-22 DIAGNOSIS — M54.50 CHRONIC LEFT-SIDED LOW BACK PAIN WITHOUT SCIATICA: ICD-10-CM

## 2017-02-22 DIAGNOSIS — Z79.899 ENCOUNTER FOR LONG-TERM (CURRENT) USE OF HIGH-RISK MEDICATION: ICD-10-CM

## 2017-02-22 DIAGNOSIS — F51.04 CHRONIC INSOMNIA: ICD-10-CM

## 2017-02-22 DIAGNOSIS — M54.2 NECK PAIN: ICD-10-CM

## 2017-02-22 DIAGNOSIS — R10.32 ABDOMINAL PAIN, LLQ (LEFT LOWER QUADRANT): Primary | ICD-10-CM

## 2017-02-22 DIAGNOSIS — M50.30 DEGENERATION OF CERVICAL INTERVERTEBRAL DISC: ICD-10-CM

## 2017-02-22 DIAGNOSIS — M25.552 PAIN IN JOINT, PELVIC REGION AND THIGH, LEFT: ICD-10-CM

## 2017-02-22 DIAGNOSIS — G89.4 CHRONIC PAIN SYNDROME: ICD-10-CM

## 2017-02-22 DIAGNOSIS — M70.62 TROCHANTERIC BURSITIS OF LEFT HIP: ICD-10-CM

## 2017-02-22 DIAGNOSIS — F41.8 DEPRESSION WITH ANXIETY: ICD-10-CM

## 2017-02-22 LAB
ALBUMIN SERPL BCP-MCNC: 3.6 G/DL (ref 3.4–5)
ALBUMIN/GLOB SERPL: 1.2 {RATIO} (ref 0.8–1.7)
ALP SERPL-CCNC: 69 U/L (ref 45–117)
ALT SERPL-CCNC: 17 U/L (ref 13–56)
ANION GAP BLD CALC-SCNC: 6 MMOL/L (ref 3–18)
APPEARANCE UR: CLEAR
AST SERPL W P-5'-P-CCNC: 14 U/L (ref 15–37)
BACTERIA URNS QL MICRO: ABNORMAL /HPF
BASOPHILS # BLD AUTO: 0 K/UL (ref 0–0.06)
BASOPHILS # BLD: 0 % (ref 0–2)
BILIRUB SERPL-MCNC: 0.5 MG/DL (ref 0.2–1)
BILIRUB UR QL: ABNORMAL
BUN SERPL-MCNC: 17 MG/DL (ref 7–18)
BUN/CREAT SERPL: 17 (ref 12–20)
CALCIUM SERPL-MCNC: 9.1 MG/DL (ref 8.5–10.1)
CAOX CRY URNS QL MICRO: ABNORMAL
CHLORIDE SERPL-SCNC: 102 MMOL/L (ref 100–108)
CO2 SERPL-SCNC: 32 MMOL/L (ref 21–32)
COLOR UR: ABNORMAL
CREAT SERPL-MCNC: 1.03 MG/DL (ref 0.6–1.3)
DIFFERENTIAL METHOD BLD: ABNORMAL
EOSINOPHIL # BLD: 0.1 K/UL (ref 0–0.4)
EOSINOPHIL NFR BLD: 1 % (ref 0–5)
EPITH CASTS URNS QL MICRO: ABNORMAL /LPF (ref 0–5)
ERYTHROCYTE [DISTWIDTH] IN BLOOD BY AUTOMATED COUNT: 13.2 % (ref 11.6–14.5)
GLOBULIN SER CALC-MCNC: 3.1 G/DL (ref 2–4)
GLUCOSE SERPL-MCNC: 94 MG/DL (ref 74–99)
GLUCOSE UR STRIP.AUTO-MCNC: NEGATIVE MG/DL
HCT VFR BLD AUTO: 37.7 % (ref 35–45)
HGB BLD-MCNC: 12.4 G/DL (ref 12–16)
HGB UR QL STRIP: NEGATIVE
HYALINE CASTS URNS QL MICRO: ABNORMAL /LPF (ref 0–2)
KETONES UR QL STRIP.AUTO: 15 MG/DL
LACTATE BLD-SCNC: 0.4 MMOL/L (ref 0.4–2)
LEUKOCYTE ESTERASE UR QL STRIP.AUTO: ABNORMAL
LYMPHOCYTES # BLD AUTO: 40 % (ref 21–52)
LYMPHOCYTES # BLD: 3.3 K/UL (ref 0.9–3.6)
MCH RBC QN AUTO: 30.5 PG (ref 24–34)
MCHC RBC AUTO-ENTMCNC: 32.9 G/DL (ref 31–37)
MCV RBC AUTO: 92.6 FL (ref 74–97)
MONOCYTES # BLD: 0.7 K/UL (ref 0.05–1.2)
MONOCYTES NFR BLD AUTO: 8 % (ref 3–10)
MUCOUS THREADS URNS QL MICRO: ABNORMAL /LPF
NEUTS SEG # BLD: 4.3 K/UL (ref 1.8–8)
NEUTS SEG NFR BLD AUTO: 51 % (ref 40–73)
NITRITE UR QL STRIP.AUTO: POSITIVE
PH UR STRIP: 5 [PH] (ref 5–8)
PLATELET # BLD AUTO: 225 K/UL (ref 135–420)
PMV BLD AUTO: 10.5 FL (ref 9.2–11.8)
POTASSIUM SERPL-SCNC: 4.2 MMOL/L (ref 3.5–5.5)
PROT SERPL-MCNC: 6.7 G/DL (ref 6.4–8.2)
PROT UR STRIP-MCNC: 30 MG/DL
RBC # BLD AUTO: 4.07 M/UL (ref 4.2–5.3)
RBC #/AREA URNS HPF: ABNORMAL /HPF (ref 0–5)
SODIUM SERPL-SCNC: 140 MMOL/L (ref 136–145)
SP GR UR REFRACTOMETRY: >1.03 (ref 1–1.03)
UROBILINOGEN UR QL STRIP.AUTO: 1 EU/DL (ref 0.2–1)
WBC # BLD AUTO: 8.4 K/UL (ref 4.6–13.2)
WBC URNS QL MICRO: ABNORMAL /HPF (ref 0–4)

## 2017-02-22 PROCEDURE — 83605 ASSAY OF LACTIC ACID: CPT

## 2017-02-22 PROCEDURE — 74177 CT ABD & PELVIS W/CONTRAST: CPT

## 2017-02-22 PROCEDURE — 85025 COMPLETE CBC W/AUTO DIFF WBC: CPT | Performed by: EMERGENCY MEDICINE

## 2017-02-22 PROCEDURE — 96361 HYDRATE IV INFUSION ADD-ON: CPT

## 2017-02-22 PROCEDURE — 74011250636 HC RX REV CODE- 250/636: Performed by: EMERGENCY MEDICINE

## 2017-02-22 PROCEDURE — 80053 COMPREHEN METABOLIC PANEL: CPT | Performed by: EMERGENCY MEDICINE

## 2017-02-22 PROCEDURE — 96374 THER/PROPH/DIAG INJ IV PUSH: CPT

## 2017-02-22 PROCEDURE — 81001 URINALYSIS AUTO W/SCOPE: CPT | Performed by: EMERGENCY MEDICINE

## 2017-02-22 PROCEDURE — 96375 TX/PRO/DX INJ NEW DRUG ADDON: CPT

## 2017-02-22 PROCEDURE — 74011636320 HC RX REV CODE- 636/320: Performed by: EMERGENCY MEDICINE

## 2017-02-22 PROCEDURE — 99284 EMERGENCY DEPT VISIT MOD MDM: CPT

## 2017-02-22 RX ORDER — DICYCLOMINE HYDROCHLORIDE 20 MG/1
20 TABLET ORAL EVERY 6 HOURS
Qty: 20 TAB | Refills: 0 | Status: SHIPPED | OUTPATIENT
Start: 2017-02-22 | End: 2017-02-27

## 2017-02-22 RX ORDER — ONDANSETRON 2 MG/ML
4 INJECTION INTRAMUSCULAR; INTRAVENOUS
Status: COMPLETED | OUTPATIENT
Start: 2017-02-22 | End: 2017-02-22

## 2017-02-22 RX ORDER — ONDANSETRON 4 MG/1
4 TABLET, FILM COATED ORAL
Qty: 12 TAB | Refills: 0 | Status: SHIPPED | OUTPATIENT
Start: 2017-02-22 | End: 2017-05-16

## 2017-02-22 RX ORDER — OXYCODONE AND ACETAMINOPHEN 10; 325 MG/1; MG/1
1 TABLET ORAL
Qty: 120 TAB | Refills: 0 | Status: SHIPPED | OUTPATIENT
Start: 2017-02-22 | End: 2017-04-13 | Stop reason: SDUPTHER

## 2017-02-22 RX ORDER — HYDROMORPHONE HYDROCHLORIDE 2 MG/ML
0.2 INJECTION, SOLUTION INTRAMUSCULAR; INTRAVENOUS; SUBCUTANEOUS ONCE
Status: COMPLETED | OUTPATIENT
Start: 2017-02-22 | End: 2017-02-22

## 2017-02-22 RX ADMIN — SODIUM CHLORIDE 1000 ML: 900 INJECTION, SOLUTION INTRAVENOUS at 15:34

## 2017-02-22 RX ADMIN — DIATRIZOATE MEGLUMINE AND DIATRIZOATE SODIUM 30 ML: 600; 100 SOLUTION ORAL; RECTAL at 15:32

## 2017-02-22 RX ADMIN — ONDANSETRON HYDROCHLORIDE 4 MG: 2 SOLUTION INTRAMUSCULAR; INTRAVENOUS at 15:34

## 2017-02-22 RX ADMIN — HYDROMORPHONE HYDROCHLORIDE 0.2 MG: 2 INJECTION, SOLUTION INTRAMUSCULAR; INTRAVENOUS; SUBCUTANEOUS at 15:34

## 2017-02-22 RX ADMIN — IOPAMIDOL 80 ML: 612 INJECTION, SOLUTION INTRAVENOUS at 17:53

## 2017-02-22 NOTE — ED PROVIDER NOTES
HPI Comments: Pt with history of previous gastric bypass, HTN, uterine CA, PUD and NATHANAEL who presents to ED on advice of her PMD due to abnormal CT findings. She states that she woke up with midline abdominal pain around 3:00am 1 week ago. She developed nausea and NBNB vomiting over the past week but she states the vomiting seems to have stopped as of this morning. She notes no change in her pain quality over the past week, currently the same as when it started. Pain starts in her LLQ and radiates up to her epigastrium. She denies any diarrhea, no rectal bleeding, no melena. She states she had a normal BM this morning. She had a remote hysterectomy and appendectomy, no other intra-abdominal surgeries. She denies any fevers or chills, no chest pain, no cough, no dyspnea, no neck or back pain, no diaphoresis. She denies any history of CAD. No other acute symptoms or complaints were noted. Past Medical History:   Diagnosis Date    Abdominal pain     Benign essential hypertension     Cancer (HCC)     uterine    Depressive disorder     Fatigue     Hypertension     Insomnia     NATHANAEL (obstructive sleep apnea)     does not use cpap machine    Osteoarthritis     Peptic ulcer 2012    Spinal stenosis     with chronic pain       Past Surgical History:   Procedure Laterality Date    HX APPENDECTOMY  1962    HX BACK SURGERY  1986    PHLD; L4-L5    HX GASTRIC BYPASS  2009    HX HYSTERECTOMY  1983    uterine cancer, stage 1    HX KNEE REPLACEMENT  2005    right    HX VISHAL AND BSO           Family History:   Problem Relation Age of Onset    Cancer Mother      lung-nonsmoker    Diabetes Other     Heart Disease Other        Social History     Social History    Marital status:      Spouse name: N/A    Number of children: N/A    Years of education: N/A     Occupational History    Not on file.      Social History Main Topics    Smoking status: Former Smoker     Packs/day: 0.25     Types: Cigarettes Quit date: 12/1/2016    Smokeless tobacco: Never Used      Comment: 6 ciggerates per day    Alcohol use Yes      Comment: socially    Drug use: No      Comment: denies    Sexual activity: No     Other Topics Concern    Not on file     Social History Narrative         ALLERGIES: Morphine    Review of Systems   Constitutional: Negative for chills, diaphoresis and fever. HENT: Negative. Eyes: Negative for visual disturbance. Respiratory: Negative for chest tightness and shortness of breath. Cardiovascular: Negative for chest pain, palpitations and leg swelling. Gastrointestinal: Positive for nausea and vomiting. Negative for abdominal pain, anal bleeding, blood in stool, constipation and diarrhea. Endocrine: Negative. Genitourinary: Negative for dysuria and hematuria. Musculoskeletal: Negative. Negative for back pain, neck pain and neck stiffness. Skin: Negative for rash. Allergic/Immunologic: Negative. Neurological: Negative for dizziness, syncope, light-headedness and headaches. Hematological: Does not bruise/bleed easily. Vitals:    02/22/17 1252   BP: 132/76   Pulse: 64   Resp: 16   Temp: 98.2 °F (36.8 °C)   SpO2: 97%   Weight: 56.2 kg (124 lb)   Height: 5' (1.524 m)            Physical Exam   Constitutional: She is oriented to person, place, and time. She appears well-developed and well-nourished. No distress. Resting comfortably on stretcher   HENT:   Head: Normocephalic and atraumatic. MM moist   Eyes: Conjunctivae and EOM are normal. No scleral icterus. Sclera clear bilaterally   Neck: Normal range of motion. Neck supple. No JVD present. Non-tender to palpation   Cardiovascular: Normal rate, regular rhythm and normal heart sounds. Exam reveals no gallop and no friction rub. No murmur heard. Pulmonary/Chest: Effort normal and breath sounds normal. No respiratory distress. She has no wheezes. She has no rales. She exhibits no tenderness.    No crepitance with palpation   Abdominal: Soft. Bowel sounds are normal. She exhibits no distension and no mass. There is tenderness. There is no rebound and no guarding. + TTP LLQ and epigastrium, mild TTP LUQ   Genitourinary:   Genitourinary Comments: No CVA tenderness   Musculoskeletal: She exhibits no edema or tenderness. Normal inspection of upper extremities. No edema noted to bilateral lower extremities   Lymphadenopathy:     She has no cervical adenopathy. Neurological: She is alert and oriented to person, place, and time. No cranial nerve deficit. She exhibits normal muscle tone. Normal motor and sensation bilaterally to upper and lower extremities   Skin: Skin is warm and dry. No rash noted. She is not diaphoretic. Psychiatric:   Normal mood and affect. Vitals reviewed. MDM  Number of Diagnoses or Management Options  Abdominal pain, LLQ (left lower quadrant):   Diagnosis management comments: Pt with CT A/P done 2/20 that showed mild diverticulosis, mild dilation of small bowel in LLQ near JJ anastamosis that could be a low-grade partial SBO most likely due to adhesive disease. Pt with 1 week of LLQ abdominal pain that radiates to epigastrium with h/o PUD and gastric bypass with recent CT abnormality seen in LLQ. Abdominal exam is unalarming, no evidence of peritonitis. Pt with normal BM today, do not suspect complete obstruction. Will check labs, treat nausea and pain and repeat CT. Reassess. Pt resting comfortably and states she is feeling better. Reviewed results with pt and family, no evidence of obstruction, ischemia or acute intra-abdominal process. Will Rx for pain and discharge to home with PMD and GI follow up. Pt in agreement with discharge and follow up plans.        Amount and/or Complexity of Data Reviewed  Clinical lab tests: ordered and reviewed  Tests in the radiology section of CPT®: ordered and reviewed  Decide to obtain previous medical records or to obtain history from someone other than the patient: yes  Obtain history from someone other than the patient: yes  Review and summarize past medical records: yes  Independent visualization of images, tracings, or specimens: yes    Risk of Complications, Morbidity, and/or Mortality  Presenting problems: moderate  Management options: moderate    Patient Progress  Patient progress: improved    ED Course       Procedures

## 2017-02-22 NOTE — TELEPHONE ENCOUNTER
Telephone call for a condition update. Patient states she had a problem taking her meds this a.m and drinking tea. She had severe abdominal pain. Patient informed the office had reached out to a surgeon and was awaiting a return call. With patient current symptoms, patient was advised to go to the Emergency room.

## 2017-02-22 NOTE — ED TRIAGE NOTES
Sent by dr Alex Marroquin for bowel blockage, saw  yesterday, had ct this week.  , hx gastric  bypass

## 2017-02-23 RX ORDER — CARVEDILOL 12.5 MG/1
TABLET ORAL
Qty: 180 TAB | Refills: 0 | Status: SHIPPED | OUTPATIENT
Start: 2017-02-23 | End: 2017-05-27 | Stop reason: SDUPTHER

## 2017-02-23 NOTE — DISCHARGE INSTRUCTIONS

## 2017-04-13 ENCOUNTER — OFFICE VISIT (OUTPATIENT)
Dept: PAIN MANAGEMENT | Age: 67
End: 2017-04-13

## 2017-04-13 VITALS — SYSTOLIC BLOOD PRESSURE: 123 MMHG | HEART RATE: 69 BPM | RESPIRATION RATE: 17 BRPM | DIASTOLIC BLOOD PRESSURE: 88 MMHG

## 2017-04-13 DIAGNOSIS — M51.37 DEGENERATION OF LUMBAR OR LUMBOSACRAL INTERVERTEBRAL DISC: ICD-10-CM

## 2017-04-13 DIAGNOSIS — Z79.899 ENCOUNTER FOR LONG-TERM (CURRENT) USE OF OTHER MEDICATIONS: ICD-10-CM

## 2017-04-13 DIAGNOSIS — M25.552 PAIN IN JOINT, PELVIC REGION AND THIGH, LEFT: ICD-10-CM

## 2017-04-13 DIAGNOSIS — K58.0 IRRITABLE BOWEL SYNDROME WITH DIARRHEA: ICD-10-CM

## 2017-04-13 DIAGNOSIS — G89.4 CHRONIC PAIN SYNDROME: Primary | ICD-10-CM

## 2017-04-13 DIAGNOSIS — F51.04 CHRONIC INSOMNIA: ICD-10-CM

## 2017-04-13 DIAGNOSIS — F41.8 DEPRESSION WITH ANXIETY: ICD-10-CM

## 2017-04-13 DIAGNOSIS — M54.50 CHRONIC LEFT-SIDED LOW BACK PAIN WITHOUT SCIATICA: ICD-10-CM

## 2017-04-13 DIAGNOSIS — G89.29 CHRONIC LEFT-SIDED LOW BACK PAIN WITHOUT SCIATICA: ICD-10-CM

## 2017-04-13 DIAGNOSIS — M96.1 POSTLAMINECTOMY SYNDROME, LUMBAR REGION: ICD-10-CM

## 2017-04-13 DIAGNOSIS — R10.84 GENERALIZED ABDOMINAL PAIN: ICD-10-CM

## 2017-04-13 DIAGNOSIS — M76.899 ENTHESOPATHY OF HIP REGION, UNSPECIFIED LATERALITY: ICD-10-CM

## 2017-04-13 DIAGNOSIS — Z79.899 ENCOUNTER FOR LONG-TERM (CURRENT) USE OF HIGH-RISK MEDICATION: ICD-10-CM

## 2017-04-13 DIAGNOSIS — M54.2 CERVICALGIA: ICD-10-CM

## 2017-04-13 DIAGNOSIS — M54.2 NECK PAIN: ICD-10-CM

## 2017-04-13 DIAGNOSIS — M70.62 TROCHANTERIC BURSITIS OF LEFT HIP: ICD-10-CM

## 2017-04-13 DIAGNOSIS — M50.30 DEGENERATION OF CERVICAL INTERVERTEBRAL DISC: ICD-10-CM

## 2017-04-13 RX ORDER — OXYCODONE AND ACETAMINOPHEN 10; 325 MG/1; MG/1
1 TABLET ORAL
Qty: 120 TAB | Refills: 0 | Status: SHIPPED | OUTPATIENT
Start: 2017-04-13 | End: 2017-07-19 | Stop reason: SDUPTHER

## 2017-04-13 RX ORDER — NALOXONE HYDROCHLORIDE 4 MG/.1ML
4 SPRAY NASAL AS NEEDED
Qty: 1 BOX | Refills: 1 | Status: SHIPPED | OUTPATIENT
Start: 2017-04-13 | End: 2019-08-19 | Stop reason: ALTCHOICE

## 2017-04-13 NOTE — PROGRESS NOTES
HISTORY OF PRESENT ILLNESS  Julee Saha is a 74 y.o. female. HPI  The patient presents today for follow up of chronic severe pain involving the cervical lumbar spine as well as both hips. She suffers from widespread osteoarthritis as well as a post lumbar laminectomy pain syndrome and trochanteric bursitis. She recently went to ED for abdominal pain last month [facility]) and was diagnosed with ulcers. She has not followed up with GI yet. She is unsure if they prescribed her pain medication, \"but I never picked it up\". The patient denies any significant changes since last f/u. She tolerates medications without side effects. Julee Saha reports no change in sleep or constipation. She takes ativan to help her sleep at night. She denies constipation (IBS-D). The patient reports 50% relief with current medications. Because the patient's current regimen places him/her at increased risk for possible overdose, a prescription for naloxone nasal spray is being provided. The patient understands that this medication is only to be used in the setting of a possible overdose and that inadvertent use of this medication could precipitate overt withdrawal.    She describes her pain as constant and achy. Weather, activity and stress aggravate her pain. Rest and medication alleviate her pain. She is able to function and have some QOL due to her pain medication. She continues to work full time for [employer]. She keeps her medication locked in a safe place at all times now. She denies homicidal/suicidal ideation. The patient reports functional improvement and QOL with pain medication. She continues to be followed for mouth ulcers. UDS  1/19/17 reviewed and consistent   reviewed and appears consistent    Review of Systems   Constitutional: Positive for malaise/fatigue. Negative for chills, fever and weight loss. HENT: Negative for congestion and sore throat.     Eyes: Negative for blurred vision and double vision. Wears glasses   Respiratory: Negative for cough and shortness of breath. Cardiovascular: Negative for chest pain, palpitations and leg swelling. Gastrointestinal: Positive for diarrhea. Negative for constipation (no longer eating peanuts (due to diverticulitis)), heartburn, nausea and vomiting. Recently saw GI (see above)  Started on dicyclomine for IBS-D   Genitourinary: Negative. Musculoskeletal: Positive for back pain, joint pain, myalgias and neck pain. Negative for falls. Skin: Negative. Neurological: Positive for dizziness (with allergies), tingling (right hand-not new) and headaches (sinus). Negative for tremors, seizures and weakness. Endo/Heme/Allergies: Positive for environmental allergies. Does not bruise/bleed easily. Psychiatric/Behavioral: Positive for depression. Negative for suicidal ideas. The patient is nervous/anxious and has insomnia. Sees psychiatry (Dr. Tate Mathis and Skylar Mabry)       Physical Exam   Constitutional: She is oriented to person, place, and time. She appears well-developed and well-nourished. No distress. HENT:   Head: Normocephalic. Right Ear: External ear normal.   Left Ear: External ear normal.   Eyes: Conjunctivae and EOM are normal. Pupils are equal, round, and reactive to light. Neck: Neck supple. Pulmonary/Chest: Effort normal. No respiratory distress. Musculoskeletal: She exhibits tenderness (cervical/left shoulder/left hip). She exhibits no edema. Right shoulder: She exhibits decreased range of motion, tenderness and pain. Left shoulder: She exhibits decreased range of motion, tenderness and pain. Left hip: She exhibits tenderness. She exhibits normal range of motion. Left knee: She exhibits normal range of motion. Tenderness found. Cervical back: She exhibits decreased range of motion, tenderness, pain and spasm.         Lumbar back: She exhibits decreased range of motion, tenderness, pain and spasm. H/o lumbar spine surgery (fusion with rods and screws)-Dr. Becky Vincent   Neurological: She is alert and oriented to person, place, and time. Gait normal.   Psychiatric: She has a normal mood and affect. Her behavior is normal. Judgment and thought content normal.   Nursing note and vitals reviewed. ASSESSMENT and PLAN  Encounter Diagnoses   Name Primary?  Chronic pain syndrome Yes    Postlaminectomy syndrome, lumbar region     Degeneration of lumbar or lumbosacral intervertebral disc     Encounter for long-term (current) use of other medications     Cervicalgia     Enthesopathy of hip region, unspecified laterality     Generalized abdominal pain     Chronic left-sided low back pain without sciatica     Irritable bowel syndrome with diarrhea     Pain in joint, pelvic region and thigh, left     Trochanteric bursitis of left hip     Neck pain     Degeneration of cervical intervertebral disc     Encounter for long-term (current) use of high-risk medication     Chronic insomnia     Depression with anxiety      Orders Placed This Encounter    naloxone 4 mg/actuation spry    oxyCODONE-acetaminophen (PERCOCET 10)  mg per tablet    oxyCODONE-acetaminophen (PERCOCET 10)  mg per tablet    oxyCODONE-acetaminophen (PERCOCET 10)  mg per tablet     Patient Instructions   1. Continue medications as prescribed  2. Follow up in three months  3. Naloxone issued  4.   Make sure to follow up with GI

## 2017-04-13 NOTE — PROGRESS NOTES
Nursing Notes    Patient presents to the office today in follow-up. Reviewed medications with counts as follows:    Rx Date filled Qty Dispensed Pill Count Last Dose Short   Percocet 10/325 3/26/17 120 74 This am no   Ms. Roc Benitez has a reminder for a \"due or due soon\" health maintenance. I have asked that she contact her primary care provider for follow-up on this health maintenance. POC UDS was not performed in office today    Any new labs or imaging since last appointment? YES  CT scan of abdomen     Have you been to an emergency room (ER) or urgent care clinic since your last visit? YES          [facility] for abdominal pain     Have you been hospitalized since your last visit? NO     If yes, where, when, and reason for visit? Have you seen or consulted any other health care providers outside of the [address] [place] [address]  since your last visit? NO     If yes, where, when, and reason for visit?

## 2017-04-13 NOTE — PATIENT INSTRUCTIONS
1.  Continue medications as prescribed  2. Follow up in three months  3. Naloxone issued  4.   Make sure to follow up with GI

## 2017-04-13 NOTE — MR AVS SNAPSHOT
Visit Information Date & Time Provider Department Dept. Phone Encounter #  
 4/13/2017  8:20 AM Alejandra Rowland, [address] [place] [address] for Pain Management [phone] [id number] Follow-up Instructions Return in about 3 months (around 7/13/2017). Your Appointments 5/12/2017  8:30 AM  
Follow Up with Luis Deluna NP  
[facility] [place] ([address]) Appt Note: 4 mos [address], [address]  
  
   
 [address], [address] [place] [address] Upcoming Health Maintenance Date Due Hepatitis C Screening 1950 DTaP/Tdap/Td series (1 - Tdap) 5/28/1969 BREAST CANCER SCRN MAMMOGRAM 5/28/1998 ZOSTER VACCINE AGE 60> 5/28/2008 GLAUCOMA SCREENING Q2Y 5/28/2013 OSTEOPOROSIS SCREENING (DEXA) 5/28/2013 Pneumococcal 65+ Low/Medium Risk (2 of 2 - PPSV23) 9/12/2017 MEDICARE YEARLY EXAM 9/13/2017 COLONOSCOPY 4/26/2019 Allergies as of 4/13/2017  Review Complete On: 4/13/2017 By: ELI Hannah Severity Noted Reaction Type Reactions Morphine    Unknown (comments) Hallucinations Pt. Denies allergy Current Immunizations  Reviewed on 9/12/2016 Name Date Influenza High Dose Vaccine PF 9/12/2016 Pneumococcal Conjugate (PCV-13) 9/12/2016 Not reviewed this visit You Were Diagnosed With   
  
 Codes Comments Chronic pain syndrome    -  Primary ICD-10-CM: G89.4 ICD-9-CM: 338.4 Postlaminectomy syndrome, lumbar region     ICD-10-CM: M96.1 ICD-9-CM: 722.83 Degeneration of lumbar or lumbosacral intervertebral disc     ICD-10-CM: M51.37 
ICD-9-CM: 722.52 Encounter for long-term (current) use of other medications     ICD-10-CM: Z79.899 ICD-9-CM: V58.69 Cervicalgia     ICD-10-CM: M54.2 ICD-9-CM: 723.1 Enthesopathy of hip region, unspecified laterality     ICD-10-CM: M76.899 ICD-9-CM: 726.5 Generalized abdominal pain     ICD-10-CM: R10.84 ICD-9-CM: 789.07 Chronic left-sided low back pain without sciatica     ICD-10-CM: M54.5, G89.29 ICD-9-CM: 724.2, 338.29 Irritable bowel syndrome with diarrhea     ICD-10-CM: K58.0 ICD-9-CM: 449.6 Pain in joint, pelvic region and thigh, left     ICD-10-CM: M25.552 ICD-9-CM: 719.45 Trochanteric bursitis of left hip     ICD-10-CM: M70.62 ICD-9-CM: 726.5 Neck pain     ICD-10-CM: M54.2 ICD-9-CM: 723.1 Degeneration of cervical intervertebral disc     ICD-10-CM: M50.30 ICD-9-CM: 722.4 Encounter for long-term (current) use of high-risk medication     ICD-10-CM: Z79.899 ICD-9-CM: V58.69 Chronic insomnia     ICD-10-CM: F51.04 
ICD-9-CM: 780.52 Depression with anxiety     ICD-10-CM: F41.8 ICD-9-CM: 300.4 Vitals BP Pulse Resp OB Status Smoking Status 123/88 71 17 Hysterectomy Former Smoker Preferred Pharmacy Pharmacy Name Phone [facility] [address] [place] [address] [phone] Your Updated Medication List  
  
   
This list is accurate as of: 4/13/17  9:06 AM.  Always use your most recent med list.  
  
  
  
  
 acyclovir 400 mg tablet Commonly known as:  ZOVIRAX TK 1 T PO TID  
  
 aluminum-magnesium hydroxide 200-200 mg/5 mL susp 30 mL, diphenhydrAMINE 12.5 mg/5 mL liqd 75 mg, lidocaine 2 % soln 30 mL, nystatin 100,000 unit/mL susp 3,000,000 Units oral suspension (compounded) Take 5 mL by mouth four (4) times daily. amLODIPine 5 mg tablet Commonly known as:  Maxine Coop TAKE 1 TABLET BY MOUTH DAILY  
  
 azelastine 137 mcg (0.1 %) nasal spray Commonly known as:  ASTELIN  
1 Spray by Both Nostrils route two (2) times a day. Use in each nostril as directed  
  
 carvedilol 12.5 mg tablet Commonly known as:  COREG  
TAKE 1 TABLET BY MOUTH TWICE DAILY WITH MEALS  
  
 cloNIDine HCl 0.1 mg tablet Commonly known as:  CATAPRES  
TAKE 1 TABLET BY MOUTH TWICE DAILY  
  
 colestipol 1 gram tablet Commonly known as:  COLESTID Take 1 g by mouth two (2) times a day. cyanocobalamin 1,000 mcg tablet Take 5,000 mcg by mouth daily. cyclobenzaprine 10 mg tablet Commonly known as:  FLEXERIL Take 10 mg by mouth nightly as needed for Muscle Spasm(s). l-methylfolate 15 mg tablet Commonly known as:  Kateryna Korean Take 15 mg by mouth daily. LORazepam 0.5 mg tablet Commonly known as:  ATIVAN Take 0.5 Tabs by mouth two (2) times daily as needed. MULTIVITAMIN PO Take  by mouth.  
  
 naloxone 4 mg/actuation Spry 4 mg by Nasal route as needed for up to 2 doses. Indications: OPIOID TOXICITY  
  
 omeprazole 20 mg capsule Commonly known as:  PRILOSEC Take 20 mg by mouth daily. ondansetron hcl 4 mg tablet Commonly known as:  ZOFRAN (AS HYDROCHLORIDE) Take 1 Tab by mouth every eight (8) hours as needed for Nausea. * oxyCODONE-acetaminophen  mg per tablet Commonly known as:  PERCOCET 10 Take 1 Tab by mouth four (4) times daily as needed for Pain for up to 30 days. Due to fill 6/21/17 * oxyCODONE-acetaminophen  mg per tablet Commonly known as:  PERCOCET 10 Take 1 Tab by mouth every six (6) hours as needed for Pain for up to 20 doses. Max Daily Amount: 4 Tabs. Due to fill 5/23/17 * oxyCODONE-acetaminophen  mg per tablet Commonly known as:  PERCOCET 10 Take 1 Tab by mouth four (4) times daily as needed for Pain for up to 30 days. OK to fill 4/24/17  
  
 triamcinolone acetonide 0.1 % dental paste Commonly known as:  KENALOG  
by Dental route two (2) times a day. zaleplon 10 mg capsule Commonly known as:  SONATA Take 1 Cap by mouth nightly. Max Daily Amount: 10 mg. Indications: SLEEP-ONSET INSOMNIA  
  
 ZOLOFT 100 mg tablet Generic drug:  sertraline Take 200 mg by mouth daily. * Notice: This list has 3 medication(s) that are the same as other medications prescribed for you. Read the directions carefully, and ask your doctor or other care provider to review them with you. Prescriptions Printed Refills  
 oxyCODONE-acetaminophen (PERCOCET 10)  mg per tablet 0 Sig: Take 1 Tab by mouth four (4) times daily as needed for Pain for up to 30 days. Due to fill 17 Class: Print Route: Oral  
 oxyCODONE-acetaminophen (PERCOCET 10)  mg per tablet 0 Sig: Take 1 Tab by mouth every six (6) hours as needed for Pain for up to 20 doses. Max Daily Amount: 4 Tabs. Due to fill 17 Class: Print Route: Oral  
 oxyCODONE-acetaminophen (PERCOCET 10)  mg per tablet 0 Sig: Take 1 Tab by mouth four (4) times daily as needed for Pain for up to 30 days. OK to fill 17 Class: Print Route: Oral  
  
Prescriptions Sent to Pharmacy Refills  
 naloxone 4 mg/actuation spry 1 Si mg by Nasal route as needed for up to 2 doses. Indications: OPIOID TOXICITY Class: Normal  
 Pharmacy: [facility] [address] [place] [address] #: [phone] Route: Nasal  
  
Follow-up Instructions Return in about 3 months (around 2017). Patient Instructions 1. Continue medications as prescribed 2. Follow up in three months 3. Naloxone issued 4. Make sure to follow up with GI Please provide this summary of care documentation to your next provider. Your primary care clinician is listed as Louisa Rod. If you have any questions after today's visit, please call [phone].

## 2017-05-15 RX ORDER — AMLODIPINE BESYLATE 5 MG/1
TABLET ORAL
Qty: 90 TAB | Refills: 0 | Status: SHIPPED | OUTPATIENT
Start: 2017-05-15 | End: 2017-08-13 | Stop reason: SDUPTHER

## 2017-05-16 ENCOUNTER — OFFICE VISIT (OUTPATIENT)
Dept: INTERNAL MEDICINE CLINIC | Age: 67
End: 2017-05-16

## 2017-05-16 ENCOUNTER — HOSPITAL ENCOUNTER (OUTPATIENT)
Dept: LAB | Age: 67
Discharge: HOME | End: 2017-05-16
Payer: MEDICARE

## 2017-05-16 VITALS
OXYGEN SATURATION: 97 % | HEART RATE: 80 BPM | HEIGHT: 60 IN | BODY MASS INDEX: 25.13 KG/M2 | DIASTOLIC BLOOD PRESSURE: 80 MMHG | RESPIRATION RATE: 16 BRPM | SYSTOLIC BLOOD PRESSURE: 152 MMHG | WEIGHT: 128 LBS | TEMPERATURE: 98.2 F

## 2017-05-16 DIAGNOSIS — M54.42 LOW BACK PAIN WITH LEFT-SIDED SCIATICA, UNSPECIFIED BACK PAIN LATERALITY, UNSPECIFIED CHRONICITY: ICD-10-CM

## 2017-05-16 DIAGNOSIS — G47.00 INSOMNIA, UNSPECIFIED TYPE: ICD-10-CM

## 2017-05-16 DIAGNOSIS — I10 BENIGN ESSENTIAL HYPERTENSION: ICD-10-CM

## 2017-05-16 DIAGNOSIS — F32.A DEPRESSIVE DISORDER: ICD-10-CM

## 2017-05-16 DIAGNOSIS — Z12.39 BREAST CANCER SCREENING: ICD-10-CM

## 2017-05-16 DIAGNOSIS — N95.1 MENOPAUSAL STATE: ICD-10-CM

## 2017-05-16 DIAGNOSIS — I10 BENIGN ESSENTIAL HYPERTENSION: Primary | ICD-10-CM

## 2017-05-16 LAB
CHOLEST SERPL-MCNC: 182 MG/DL
HDLC SERPL-MCNC: 80 MG/DL (ref 40–60)
HDLC SERPL: 2.3 {RATIO} (ref 0–5)
LDLC SERPL CALC-MCNC: 85.6 MG/DL (ref 0–100)
LIPID PROFILE,FLP: ABNORMAL
TRIGL SERPL-MCNC: 82 MG/DL (ref ?–150)
VLDLC SERPL CALC-MCNC: 16.4 MG/DL

## 2017-05-16 PROCEDURE — 80061 LIPID PANEL: CPT | Performed by: NURSE PRACTITIONER

## 2017-05-16 RX ORDER — ARIPIPRAZOLE 10 MG/1
TABLET ORAL
COMMUNITY

## 2017-05-16 RX ORDER — LANOLIN ALCOHOL/MO/W.PET/CERES
400 CREAM (GRAM) TOPICAL DAILY
COMMUNITY
End: 2019-01-21

## 2017-05-16 RX ORDER — MIRTAZAPINE 15 MG/1
TABLET, FILM COATED ORAL
Qty: 90 TAB | Refills: 0 | Status: SHIPPED | OUTPATIENT
Start: 2017-05-16 | End: 2017-08-13 | Stop reason: SDUPTHER

## 2017-05-16 RX ORDER — MIRTAZAPINE 15 MG/1
15 TABLET, FILM COATED ORAL
Qty: 30 TAB | Refills: 0 | Status: SHIPPED | OUTPATIENT
Start: 2017-05-16 | End: 2017-05-16 | Stop reason: SDUPTHER

## 2017-05-16 RX ORDER — RANITIDINE 150 MG/1
150 TABLET, FILM COATED ORAL 2 TIMES DAILY
COMMUNITY
End: 2019-01-21

## 2017-05-16 RX ORDER — DICYCLOMINE HYDROCHLORIDE 10 MG/1
10 CAPSULE ORAL
COMMUNITY

## 2017-05-16 NOTE — PROGRESS NOTES
Patient presents for   Chief Complaint   Patient presents with    Well Woman     4 month follow up     Fall risk assessment was not indicated. Depression screening was not indicated Follow up questions were not indicated. 1. Have you been to the ER, urgent care clinic since your last visit? Hospitalized since your last visit? No    2. Have you seen or consulted any other health care providers outside of the [address] [place] [address] since your last visit? Include any pap smears or colon screening.  No

## 2017-05-16 NOTE — PROGRESS NOTES
Luiza Starr is a 74 y.o. female presenting today for Well Woman (4 month follow up)  . HPI:  Luiza Starr presents to the office today for well woman follow-up. She continues to take medication for GERD, HTN and Depression. Patient c/o of hip and left lumbar pain which is chronic. Patient is currently being treated by pain management. Review of Systems   Respiratory: Negative for cough, sputum production and shortness of breath. Cardiovascular: Negative for chest pain and palpitations. Gastrointestinal: Negative for constipation, diarrhea, nausea and vomiting. Musculoskeletal: Positive for back pain. Neurological: Negative for headaches. Allergies   Allergen Reactions    Morphine Unknown (comments)     Hallucinations  Pt. Denies allergy         Current Outpatient Prescriptions   Medication Sig Dispense Refill    potassium 99 mg tablet Take 99 mg by mouth daily.  dicyclomine (BENTYL) 10 mg capsule Take 10 mg by mouth 4 times daily (before meals and nightly).  ARIPiprazole (ABILIFY) 10 mg tablet Take 1/2 to 1 tablet by mouth daily as directed      folic acid 294 mcg tablet Take 400 mcg by mouth daily.  raNITIdine (ZANTAC) 150 mg tablet Take 150 mg by mouth two (2) times a day.  amLODIPine (NORVASC) 5 mg tablet TAKE 1 TABLET BY MOUTH DAILY 90 Tab 0    naloxone 4 mg/actuation spry 4 mg by Nasal route as needed for up to 2 doses. Indications: OPIOID TOXICITY 1 Box 1    oxyCODONE-acetaminophen (PERCOCET 10)  mg per tablet Take 1 Tab by mouth every six (6) hours as needed for Pain for up to 20 doses. Max Daily Amount: 4 Tabs. Due to fill 5/23/17 120 Tab 0    carvedilol (COREG) 12.5 mg tablet TAKE 1 TABLET BY MOUTH TWICE DAILY WITH MEALS 180 Tab 0    cloNIDine HCl (CATAPRES) 0.1 mg tablet TAKE 1 TABLET BY MOUTH TWICE DAILY 180 Tab 0    l-methylfolate (DEPLIN) 15 mg tablet Take 15 mg by mouth daily.       colestipol (COLESTID) 1 gram tablet Take 1 g by mouth two (2) times a day.  cyanocobalamin 1,000 mcg tablet Take 5,000 mcg by mouth daily.  LORazepam (ATIVAN) 0.5 mg tablet Take 0.5 Tabs by mouth two (2) times daily as needed. 1    azelastine (ASTELIN) 137 mcg (0.1 %) nasal spray 1 [place] by Both Nostrils route two (2) times a day. Use in each nostril as directed 1 Bottle 2    MULTIVITAMIN PO Take  by mouth.  sertraline (ZOLOFT) 100 mg tablet Take 200 mg by mouth daily.  mirtazapine (REMERON) 15 mg tablet TAKE 1 TABLET BY MOUTH EVERY NIGHT 90 Tab 0    oxyCODONE-acetaminophen (PERCOCET 10)  mg per tablet Take 1 Tab by mouth four (4) times daily as needed for Pain for up to 30 days. Due to fill 6/21/17 120 Tab 0    oxyCODONE-acetaminophen (PERCOCET 10)  mg per tablet Take 1 Tab by mouth four (4) times daily as needed for Pain for up to 30 days. OK to fill 4/24/17 120 Tab 0       Past Medical History:   Diagnosis Date    Abdominal pain     Benign essential hypertension     Cancer (HCC)     uterine    Depressive disorder     Fatigue     Hypertension     Insomnia     NATHANAEL (obstructive sleep apnea)     does not use cpap machine    Osteoarthritis     Peptic ulcer 2012    Spinal stenosis     with chronic pain       Past Surgical History:   Procedure Laterality Date    HX APPENDECTOMY  1962    HX BACK SURGERY  1986    PHLD; L4-L5    HX GASTRIC BYPASS  2009    HX HYSTERECTOMY  1983    uterine cancer, stage 1    HX KNEE REPLACEMENT  2005    right    HX VISHAL AND BSO         Social History     Social History    Marital status:      Spouse name: N/A    Number of children: N/A    Years of education: N/A     Occupational History    Not on file.      Social History Main Topics    Smoking status: Current Every Day Smoker     Packs/day: 0.25     Types: Cigarettes    Smokeless tobacco: Never Used      Comment: 6 ciggerates per day    Alcohol use Yes      Comment: socially    Drug use: No      Comment: denies    Sexual activity: No     Other Topics Concern    Not on file     Social History Narrative       Patient does not have an advanced directive on file    Vitals:    05/16/17 0837   BP: 152/80   Pulse: 80   Resp: 16   Temp: 98.2 °F (36.8 °C)   TempSrc: Tympanic   SpO2: 97%   Weight: 128 lb (58.1 kg)   Height: 5' (1.524 m)   PainSc:  10 - Worst pain ever   PainLoc: Back       Physical Exam   Constitutional: No distress. HENT:   Right Ear: External ear normal.   Left Ear: External ear normal.   Mouth/Throat: No oropharyngeal exudate. Cardiovascular: Normal rate and normal heart sounds. No murmur heard. Pulmonary/Chest: Effort normal and breath sounds normal.   Abdominal: Soft. Lymphadenopathy:     She has no cervical adenopathy. Neurological: She is alert. Skin: Skin is warm. Psychiatric: She has a flat affect. Nursing note and vitals reviewed. Hospital Outpatient Visit on 05/16/2017   Component Date Value Ref Range Status    LIPID PROFILE 05/16/2017        Final    Cholesterol, total 05/16/2017 182  <200 MG/DL Final    Triglyceride 05/16/2017 82  <150 MG/DL Final    Comment: The drugs N-acetylcysteine (NAC) and  Metamiszole have been found to cause falsely  low results in this chemical assay. Please  be sure to submit blood samples obtained  BEFORE administration of either of these  drugs to assure correct results.       HDL Cholesterol 05/16/2017 80* 40 - 60 MG/DL Final    LDL, calculated 05/16/2017 85.6  0 - 100 MG/DL Final    VLDL, calculated 05/16/2017 16.4  MG/DL Final    CHOL/HDL Ratio 05/16/2017 2.3  0 - 5.0   Final   Admission on 02/22/2017, Discharged on 02/22/2017   Component Date Value Ref Range Status    WBC 02/22/2017 8.4  4.6 - 13.2 K/uL Final    RBC 02/22/2017 4.07* 4.20 - 5.30 M/uL Final    HGB 02/22/2017 12.4  12.0 - 16.0 g/dL Final    HCT 02/22/2017 37.7  35.0 - 45.0 % Final    MCV 02/22/2017 92.6  74.0 - 97.0 FL Final    MCH 02/22/2017 30.5  24.0 - 34.0 PG Final    MCHC 02/22/2017 32.9  31.0 - 37.0 g/dL Final    RDW 02/22/2017 13.2  11.6 - 14.5 % Final    PLATELET 47/49/0870 065  135 - 420 K/uL Final    MPV 02/22/2017 10.5  9.2 - 11.8 FL Final    NEUTROPHILS 02/22/2017 51  40 - 73 % Final    LYMPHOCYTES 02/22/2017 40  21 - 52 % Final    MONOCYTES 02/22/2017 8  3 - 10 % Final    EOSINOPHILS 02/22/2017 1  0 - 5 % Final    BASOPHILS 02/22/2017 0  0 - 2 % Final    ABS. NEUTROPHILS 02/22/2017 4.3  1.8 - 8.0 K/UL Final    ABS. LYMPHOCYTES 02/22/2017 3.3  0.9 - 3.6 K/UL Final    ABS. MONOCYTES 02/22/2017 0.7  0.05 - 1.2 K/UL Final    ABS. EOSINOPHILS 02/22/2017 0.1  0.0 - 0.4 K/UL Final    ABS. BASOPHILS 02/22/2017 0.0  0.0 - 0.06 K/UL Final    DF 02/22/2017 AUTOMATED    Final    Sodium 02/22/2017 140  136 - 145 mmol/L Final    Potassium 02/22/2017 4.2  3.5 - 5.5 mmol/L Final    Chloride 02/22/2017 102  100 - 108 mmol/L Final    CO2 02/22/2017 32  21 - 32 mmol/L Final    Anion gap 02/22/2017 6  3.0 - 18 mmol/L Final    Glucose 02/22/2017 94  74 - 99 mg/dL Final    BUN 02/22/2017 17  7.0 - 18 MG/DL Final    Creatinine 02/22/2017 1.03  0.6 - 1.3 MG/DL Final    BUN/Creatinine ratio 02/22/2017 17  12 - 20   Final    GFR est AA 02/22/2017 >60  >60 ml/min/1.73m2 Final    GFR est non-AA 02/22/2017 53* >60 ml/min/1.73m2 Final    Comment: (NOTE)  Estimated GFR is calculated using the Modification of Diet in Renal   Disease (MDRD) Study equation, reported for both  Americans   (GFRAA) and non- Americans (GFRNA), and normalized to 1.73m2   body surface area. The physician must decide which value applies to   the patient. The MDRD study equation should only be used in   individuals age 25 or older. It has not been validated for the   following: pregnant women, patients with serious comorbid conditions,   or on certain medications, or persons with extremes of body size,   muscle mass, or nutritional status.       Calcium 02/22/2017 9.1  8.5 - 10.1 MG/DL Final    Bilirubin, total 02/22/2017 0.5  0.2 - 1.0 MG/DL Final    ALT (SGPT) 02/22/2017 17  13 - 56 U/L Final    AST (SGOT) 02/22/2017 14* 15 - 37 U/L Final    Alk. phosphatase 02/22/2017 69  45 - 117 U/L Final    Protein, total 02/22/2017 6.7  6.4 - 8.2 g/dL Final    Albumin 02/22/2017 3.6  3.4 - 5.0 g/dL Final    Globulin 02/22/2017 3.1  2.0 - 4.0 g/dL Final    A-G Ratio 02/22/2017 1.2  0.8 - 1.7   Final    Color 02/22/2017 DARK YELLOW    Final    Appearance 02/22/2017 CLEAR    Final    Specific gravity 02/22/2017 >1.030* 1.005 - 1.030 Final    pH (UA) 02/22/2017 5.0  5.0 - 8.0   Final    Protein 02/22/2017 30* NEG mg/dL Final    Glucose 02/22/2017 NEGATIVE   NEG mg/dL Final    Ketone 02/22/2017 15* NEG mg/dL Final    Bilirubin 02/22/2017 SMALL* NEG   Final    Comment: (NOTE)  Positive, unable to confirm with Ictotest.      Blood 02/22/2017 NEGATIVE   NEG   Final    Urobilinogen 02/22/2017 1.0  0.2 - 1.0 EU/dL Final    Nitrites 02/22/2017 POSITIVE* NEG   Final    Leukocyte Esterase 02/22/2017 SMALL* NEG   Final    WBC 02/22/2017 4 to 10  0 - 4 /hpf Final    RBC 02/22/2017 0 to 3  0 - 5 /hpf Final    Epithelial cells 02/22/2017 2+  0 - 5 /lpf Final    Bacteria 02/22/2017 4+* NEG /hpf Final    Mucus 02/22/2017 4+* NEG /lpf Final    CA Oxalate crystals 02/22/2017 4+* NEG Final    Hyaline cast 02/22/2017 36 to 50  0 - 2 /lpf Final    Lactic Acid (POC) 02/22/2017 0.4  0.4 - 2.0 mmol/L Final   Hospital Outpatient Visit on 02/20/2017   Component Date Value Ref Range Status    Creatinine (POC) 02/20/2017 0.7  0.6 - 1.3 MG/DL Final    GFR-AA (POC) 02/20/2017 >60  >60 ml/min/1.73m2 Final    GFR, non-AA (POC) 02/20/2017 >60  >60 ml/min/1.73m2 Final    Comment: Estimated GFR is calculated using the IDMS-traceable Modification of Diet in Renal Disease (MDRD) Study equation, reported for both  Americans (GFRAA) and non- Americans (GFRNA), and normalized to 1.73m2 body surface area.  The physician must decide which value applies to the patient. The MDRD study equation should only be used in individuals age 25 or older. It has not been validated for the following: pregnant women, patients with serious comorbid conditions, or on certain medications, or persons with extremes of body size, muscle mass, or nutritional status. Office Visit on 02/20/2017   Component Date Value Ref Range Status    Color (UA POC) 02/20/2017 Bea   Final    Clarity (UA POC) 02/20/2017 Clear   Final    Glucose (UA POC) 02/20/2017 Trace  Negative Final    Bilirubin (UA POC) 02/20/2017 1+  Negative Final    Ketones (UA POC) 02/20/2017 Trace  Negative Final    Specific gravity (UA POC) 02/20/2017 1.030  1.001 - 1.035 Final    Blood (UA POC) 02/20/2017 Negative  Negative Final    pH (UA POC) 02/20/2017 5.5  4.6 - 8.0 Final    Protein (UA POC) 02/20/2017 1+  Negative mg/dL Final    Urobilinogen (UA POC) 02/20/2017 1 mg/dL  0.2 - 1 Final    Nitrites (UA POC) 02/20/2017 Negative  Negative Final    Leukocyte esterase (UA POC) 02/20/2017 Negative  Negative Final       .  Results for orders placed or performed during the hospital encounter of 05/16/17   LIPID PANEL   Result Value Ref Range    LIPID PROFILE          Cholesterol, total 182 <200 MG/DL    Triglyceride 82 <150 MG/DL    HDL Cholesterol 80 (H) 40 - 60 MG/DL    LDL, calculated 85.6 0 - 100 MG/DL    VLDL, calculated 16.4 MG/DL    CHOL/HDL Ratio 2.3 0 - 5.0         Assessment / Plan:      ICD-10-CM ICD-9-CM    1. Benign essential hypertension I10 401.1 LIPID PANEL      OR COLLECTION VENOUS BLOOD,VENIPUNCTURE   2. Breast cancer screening Z12.39 V76.10 SHEA MAMMO BI SCREENING INCL CAD      OR COLLECTION VENOUS BLOOD,VENIPUNCTURE   3. Menopausal state N95.1 627.2 DEXA BONE DENSITY STUDY AXIAL      OR COLLECTION VENOUS BLOOD,VENIPUNCTURE   4.  Low back pain with left-sided sciatica, unspecified back pain laterality, unspecified chronicity M54.42 724.3 OR COLLECTION VENOUS BLOOD,VENIPUNCTURE   5. Depressive disorder F32.9 311 [place] COLLECTION VENOUS BLOOD,VENIPUNCTURE   6. Insomnia, unspecified type G47.00 780.52 [place] COLLECTION VENOUS BLOOD,VENIPUNCTURE      DISCONTINUED: mirtazapine (REMERON) 15 mg tablet     Mammo ordered  Dexa scan ordered  Lipid panel today  Insomnia- Remeron rx  F/u in 5 months      Follow-up Disposition:  Return in about 5 months (around 10/16/2017). I asked the patient if she  had any questions and answered her  questions.   The patient stated that she understands the treatment plan and agrees with the treatment plan

## 2017-05-17 ENCOUNTER — TELEPHONE (OUTPATIENT)
Dept: INTERNAL MEDICINE CLINIC | Age: 67
End: 2017-05-17

## 2017-05-17 NOTE — TELEPHONE ENCOUNTER
----- Message from Kyaw Maddox NP sent at 5/17/2017 11:52 AM EDT -----  Please notify patient that lab results were reviewed and the results are normal.

## 2017-05-17 NOTE — TELEPHONE ENCOUNTER
Contacted Gorman Aase and informed Her of lab results per Vi Dolan NP's request. Gorman Aase expressed understanding.

## 2017-05-27 RX ORDER — CARVEDILOL 12.5 MG/1
TABLET ORAL
Qty: 180 TAB | Refills: 0 | Status: SHIPPED | OUTPATIENT
Start: 2017-05-27 | End: 2017-08-24 | Stop reason: SDUPTHER

## 2017-06-01 ENCOUNTER — HOSPITAL ENCOUNTER (OUTPATIENT)
Dept: MAMMOGRAPHY | Age: 67
Discharge: HOME | End: 2017-06-01
Attending: INTERNAL MEDICINE
Payer: MEDICARE

## 2017-06-01 ENCOUNTER — HOSPITAL ENCOUNTER (OUTPATIENT)
Dept: BONE DENSITY | Age: 67
Discharge: HOME | End: 2017-06-01
Attending: INTERNAL MEDICINE
Payer: MEDICARE

## 2017-06-01 DIAGNOSIS — Z12.39 BREAST CANCER SCREENING: ICD-10-CM

## 2017-06-01 DIAGNOSIS — Z78.0 POSTMENOPAUSAL: ICD-10-CM

## 2017-06-01 PROCEDURE — 77080 DXA BONE DENSITY AXIAL: CPT

## 2017-06-01 PROCEDURE — 77067 SCR MAMMO BI INCL CAD: CPT

## 2017-06-17 DIAGNOSIS — I10 BENIGN ESSENTIAL HYPERTENSION: ICD-10-CM

## 2017-06-20 RX ORDER — CLONIDINE HYDROCHLORIDE 0.1 MG/1
TABLET ORAL
Qty: 180 TAB | Refills: 0 | Status: SHIPPED | OUTPATIENT
Start: 2017-06-20 | End: 2017-09-19 | Stop reason: SDUPTHER

## 2017-07-10 ENCOUNTER — OFFICE VISIT (OUTPATIENT)
Dept: INTERNAL MEDICINE CLINIC | Age: 67
End: 2017-07-10

## 2017-07-10 VITALS
HEART RATE: 110 BPM | WEIGHT: 128 LBS | RESPIRATION RATE: 16 BRPM | DIASTOLIC BLOOD PRESSURE: 60 MMHG | TEMPERATURE: 98.3 F | HEIGHT: 60 IN | SYSTOLIC BLOOD PRESSURE: 130 MMHG | OXYGEN SATURATION: 98 % | BODY MASS INDEX: 25.13 KG/M2

## 2017-07-10 DIAGNOSIS — I10 BENIGN ESSENTIAL HYPERTENSION: ICD-10-CM

## 2017-07-10 DIAGNOSIS — Z01.818 PREOP EXAMINATION: Primary | ICD-10-CM

## 2017-07-10 DIAGNOSIS — M81.0 OSTEOPOROSIS WITHOUT CURRENT PATHOLOGICAL FRACTURE, UNSPECIFIED OSTEOPOROSIS TYPE: ICD-10-CM

## 2017-07-10 RX ORDER — RISEDRONATE SODIUM 35 MG/1
35 TABLET, FILM COATED ORAL
Qty: 4 TAB | Refills: 11 | Status: SHIPPED | OUTPATIENT
Start: 2017-07-10 | End: 2017-07-11 | Stop reason: SDUPTHER

## 2017-07-10 NOTE — PROGRESS NOTES
Chica Hernandes is a 69 y.o. female presenting today for Pre-op Exam (Cataract extraction with intraocular lens transplant)  . HPI:  Chica Hernandes presents to the office today for Preoperative examination. Patient is scheduled for Bilateral Cataract Extraction with Intraocular Lens Implantation on July 21, 2017 and August 2, 2017 with Dr. Jena Rivas @ [facility] under Topical with MAC anesthesia. Review of Systems   Constitutional: Negative for chills and fever. HENT: Negative for congestion and sore throat. Eyes:        Bilateral cataracts   Respiratory: Negative for cough. Cardiovascular: Negative for chest pain and palpitations. Gastrointestinal: Negative for abdominal pain, constipation and diarrhea. Genitourinary: Negative for dysuria. Neurological: Negative for dizziness and headaches. Allergies   Allergen Reactions    Morphine Unknown (comments)     Hallucinations  Pt. Denies allergy         Current Outpatient Prescriptions   Medication Sig Dispense Refill    risedronate (ACTONEL) 35 mg tablet Take 1 Tab by mouth every seven (7) days. Take with water >30 minutes before first meal and remain upright for 30 minutes. 4 Tab 11    cloNIDine HCl (CATAPRES) 0.1 mg tablet TAKE 1 TABLET BY MOUTH TWICE DAILY 180 Tab 0    carvedilol (COREG) 12.5 mg tablet TAKE 1 TABLET BY MOUTH TWICE DAILY WITH MEALS 180 Tab 0    potassium 99 mg tablet Take 99 mg by mouth daily.  dicyclomine (BENTYL) 10 mg capsule Take 10 mg by mouth 4 times daily (before meals and nightly).  ARIPiprazole (ABILIFY) 10 mg tablet Take 1/2 to 1 tablet by mouth daily as directed      folic acid 231 mcg tablet Take 400 mcg by mouth daily.  raNITIdine (ZANTAC) 150 mg tablet Take 150 mg by mouth two (2) times a day.       mirtazapine (REMERON) 15 mg tablet TAKE 1 TABLET BY MOUTH EVERY NIGHT 90 Tab 0    amLODIPine (NORVASC) 5 mg tablet TAKE 1 TABLET BY MOUTH DAILY 90 Tab 0    naloxone 4 mg/actuation spry 4 mg by Nasal route as needed for up to 2 doses. Indications: OPIOID TOXICITY 1 Box 1    oxyCODONE-acetaminophen (PERCOCET 10)  mg per tablet Take 1 Tab by mouth every six (6) hours as needed for Pain for up to 20 doses. Max Daily Amount: 4 Tabs. Due to fill 5/23/17 120 Tab 0    l-methylfolate (DEPLIN) 15 mg tablet Take 15 mg by mouth daily.  colestipol (COLESTID) 1 gram tablet Take 1 g by mouth two (2) times a day.  cyanocobalamin 1,000 mcg tablet Take 5,000 mcg by mouth daily.  LORazepam (ATIVAN) 0.5 mg tablet Take 0.5 Tabs by mouth two (2) times daily as needed. 1    azelastine (ASTELIN) 137 mcg (0.1 %) nasal spray [address] [place] by Both Nostrils route two (2) times a day. Use in each nostril as directed 1 Bottle 2    MULTIVITAMIN PO Take  by mouth.  sertraline (ZOLOFT) 100 mg tablet Take 200 mg by mouth daily.  oxyCODONE-acetaminophen (PERCOCET 10)  mg per tablet Take 1 Tab by mouth four (4) times daily as needed for Pain for up to 30 days. Due to fill 6/21/17 120 Tab 0    oxyCODONE-acetaminophen (PERCOCET 10)  mg per tablet Take 1 Tab by mouth four (4) times daily as needed for Pain for up to 30 days.  OK to fill 4/24/17 120 Tab 0       Past Medical History:   Diagnosis Date    Abdominal pain     Benign essential hypertension     Cancer (HCC)     uterine    Depressive disorder     Fatigue     Hypertension     Insomnia     NATHANAEL (obstructive sleep apnea)     does not use cpap machine    Osteoarthritis     Peptic ulcer 2012    Spinal stenosis     with chronic pain       Past Surgical History:   Procedure Laterality Date    HX APPENDECTOMY  1962    HX BACK SURGERY  1986    PHLD; L4-L5    HX GASTRIC BYPASS  2009    HX HYSTERECTOMY  1983    uterine cancer, stage 1    HX KNEE REPLACEMENT  2005    right    HX VISHAL AND BSO         Social History     Social History    Marital status:      Spouse name: N/A    Number of children: N/A    Years of education: N/A     Occupational History    Not on file. Social History Main Topics    Smoking status: Current Every Day Smoker     Packs/day: 0.25     Types: Cigarettes    Smokeless tobacco: Never Used      Comment: 6 ciggerates per day    Alcohol use Yes      Comment: socially    Drug use: No      Comment: denies    Sexual activity: No     Other Topics Concern    Not on file     Social History Narrative       Patient does not have an advanced directive on file    Vitals:    07/10/17 0826   BP: 130/60   Pulse: (!) 110   Resp: 16   Temp: 98.3 °F (36.8 °C)   TempSrc: Tympanic   SpO2: 98%   Weight: 128 lb (58.1 kg)   Height: 5' (1.524 m)   PainSc:   0 - No pain       Physical Exam   Constitutional: No distress. HENT:   Head: Normocephalic. Neck: Normal range of motion. No thyromegaly present. Cardiovascular: Normal rate, regular rhythm and normal heart sounds. Pulmonary/Chest: Effort normal and breath sounds normal.   Abdominal: Soft. Bowel sounds are normal.   Musculoskeletal: Normal range of motion. Lymphadenopathy:     She has no cervical adenopathy. Neurological: She is alert. Skin: Skin is warm and dry. Nursing note and vitals reviewed. Hospital Outpatient Visit on 05/16/2017   Component Date Value Ref Range Status    LIPID PROFILE 05/16/2017        Final    Cholesterol, total 05/16/2017 182  <200 MG/DL Final    Triglyceride 05/16/2017 82  <150 MG/DL Final    Comment: The drugs N-acetylcysteine (NAC) and  Metamiszole have been found to cause falsely  low results in this chemical assay. Please  be sure to submit blood samples obtained  BEFORE administration of either of these  drugs to assure correct results.       HDL Cholesterol 05/16/2017 80* 40 - 60 MG/DL Final    LDL, calculated 05/16/2017 85.6  0 - 100 MG/DL Final    VLDL, calculated 05/16/2017 16.4  MG/DL Final    CHOL/HDL Ratio 05/16/2017 2.3  0 - 5.0   Final       .No results found for any visits on 07/10/17. Assessment / Plan:      ICD-10-CM ICD-9-CM    1. Preop examination Z01.818 V72.84    2. Benign essential hypertension I10 401.1    3. Osteoporosis without current pathological fracture, unspecified osteoporosis type M81.0 733.00 risedronate (ACTONEL) 35 mg tablet     Hypertension- controlled  Actonel rx given    Patient is optimized for Bilateral Cataract Extraction with Intraocular Implantation on July 21, 2017 and August 2, 2017. Follow-up Disposition:  Return in about 4 months (around 11/10/2017). I asked the patient if she  had any questions and answered her  questions.   The patient stated that she understands the treatment plan and agrees with the treatment plan

## 2017-07-10 NOTE — PROGRESS NOTES
Patient presents for   Chief Complaint   Patient presents with    Pre-op Exam     Cataract extraction with intraocular lens transplant     Fall risk assessment was not indicated. Depression screening was not indicated Follow up questions were not indicated. 1. Have you been to the ER, urgent care clinic since your last visit? Hospitalized since your last visit? No    2. Have you seen or consulted any other health care providers outside of the [address] [place] [address] since your last visit? Include any pap smears or colon screening.  No

## 2017-07-11 DIAGNOSIS — M81.0 OSTEOPOROSIS WITHOUT CURRENT PATHOLOGICAL FRACTURE, UNSPECIFIED OSTEOPOROSIS TYPE: ICD-10-CM

## 2017-07-11 RX ORDER — RISEDRONATE SODIUM 35 MG/1
TABLET, FILM COATED ORAL
Qty: 12 TAB | Refills: 3 | Status: SHIPPED | OUTPATIENT
Start: 2017-07-11 | End: 2019-01-21

## 2017-07-19 ENCOUNTER — OFFICE VISIT (OUTPATIENT)
Dept: PAIN MANAGEMENT | Age: 67
End: 2017-07-19

## 2017-07-19 VITALS
RESPIRATION RATE: 16 BRPM | WEIGHT: 128 LBS | DIASTOLIC BLOOD PRESSURE: 74 MMHG | TEMPERATURE: 98.1 F | BODY MASS INDEX: 25 KG/M2 | HEART RATE: 72 BPM | SYSTOLIC BLOOD PRESSURE: 175 MMHG

## 2017-07-19 DIAGNOSIS — M50.30 DEGENERATION OF CERVICAL INTERVERTEBRAL DISC: ICD-10-CM

## 2017-07-19 DIAGNOSIS — Z79.899 ENCOUNTER FOR LONG-TERM (CURRENT) USE OF HIGH-RISK MEDICATION: Primary | ICD-10-CM

## 2017-07-19 DIAGNOSIS — F51.04 CHRONIC INSOMNIA: ICD-10-CM

## 2017-07-19 DIAGNOSIS — M54.2 NECK PAIN: ICD-10-CM

## 2017-07-19 DIAGNOSIS — F41.8 DEPRESSION WITH ANXIETY: ICD-10-CM

## 2017-07-19 DIAGNOSIS — M70.62 TROCHANTERIC BURSITIS OF LEFT HIP: ICD-10-CM

## 2017-07-19 DIAGNOSIS — K58.0 IRRITABLE BOWEL SYNDROME WITH DIARRHEA: ICD-10-CM

## 2017-07-19 DIAGNOSIS — G89.4 CHRONIC PAIN SYNDROME: ICD-10-CM

## 2017-07-19 DIAGNOSIS — M25.552 PAIN IN JOINT, PELVIC REGION AND THIGH, LEFT: ICD-10-CM

## 2017-07-19 DIAGNOSIS — M54.50 CHRONIC LEFT-SIDED LOW BACK PAIN WITHOUT SCIATICA: ICD-10-CM

## 2017-07-19 DIAGNOSIS — G89.29 CHRONIC LEFT-SIDED LOW BACK PAIN WITHOUT SCIATICA: ICD-10-CM

## 2017-07-19 DIAGNOSIS — M51.37 DEGENERATION OF LUMBAR OR LUMBOSACRAL INTERVERTEBRAL DISC: ICD-10-CM

## 2017-07-19 DIAGNOSIS — M96.1 POSTLAMINECTOMY SYNDROME, LUMBAR REGION: ICD-10-CM

## 2017-07-19 LAB
ALCOHOL UR POC: NORMAL
AMPHETAMINES UR POC: NEGATIVE
BARBITURATES UR POC: NEGATIVE
BENZODIAZEPINES UR POC: NEGATIVE
BUPRENORPHINE UR POC: NORMAL
CANNABINOIDS UR POC: NEGATIVE
CARISOPRODOL UR POC: NORMAL
COCAINE UR POC: NEGATIVE
FENTANYL UR POC: NORMAL
MDMA/ECSTASY UR POC: NEGATIVE
METHADONE UR POC: NEGATIVE
METHAMPHETAMINE UR POC: NEGATIVE
METHYLPHENIDATE UR POC: NEGATIVE
OPIATES UR POC: NEGATIVE
OXYCODONE UR POC: NORMAL
PHENCYCLIDINE UR POC: NEGATIVE
PROPOXYPHENE UR POC: NORMAL
TRAMADOL UR POC: NORMAL
TRICYCLICS UR POC: NEGATIVE

## 2017-07-19 RX ORDER — OXYCODONE AND ACETAMINOPHEN 10; 325 MG/1; MG/1
1 TABLET ORAL
Qty: 120 TAB | Refills: 0 | Status: SHIPPED | OUTPATIENT
Start: 2017-07-31 | End: 2017-10-12 | Stop reason: SDUPTHER

## 2017-07-19 RX ORDER — OXYCODONE AND ACETAMINOPHEN 10; 325 MG/1; MG/1
1 TABLET ORAL
Qty: 120 TAB | Refills: 0 | Status: SHIPPED | OUTPATIENT
Start: 2017-08-30 | End: 2017-10-12 | Stop reason: SDUPTHER

## 2017-07-19 RX ORDER — OXYCODONE AND ACETAMINOPHEN 10; 325 MG/1; MG/1
1 TABLET ORAL
Qty: 120 TAB | Refills: 0 | Status: SHIPPED | OUTPATIENT
Start: 2017-09-29 | End: 2017-10-12 | Stop reason: SDUPTHER

## 2017-07-19 NOTE — PROGRESS NOTES
Nursing Notes    Patient presents to the office today in follow-up. Patient rates her pain at 5/10 on the numerical pain scale. Reviewed medications with counts as follows:    Rx Date filled Qty Dispensed Pill Count Last Dose Short   Percocet 10 7/1/17 120 55.5 7/19/17 no         Comments: pt advises she had a couple glasses of wine last night. She did not take her percocet past 12 noon however. POC UDS was performed in office today    Any new labs or imaging since last appointment? NO    Have you been to an emergency room (ER) or urgent care clinic since your last visit? NO            Have you been hospitalized since your last visit? NO     If yes, where, when, and reason for visit? Have you seen or consulted any other health care providers outside of the [address] [place] [address]  since your last visit? NO     If yes, where, when, and reason for visit? Ms. Casey Bautista has a reminder for a \"due or due soon\" health maintenance. I have asked that she contact her primary care provider for follow-up on this health maintenance.

## 2017-07-19 NOTE — PROGRESS NOTES
Mary Ortiz returns today for f/u of chronic severe pain involving the cervical lumbar spine as well as both hips. She suffers from widespread osteoarthritis as well as a post lumbar laminectomy pain syndrome and trochanteric bursitis. Ms. Emmanuel No continues unchanged since last visit. She has been doing very well with her current treatment plan which has been offering significant pain control. She does report having a glass of wine during dinner with a friend last night. She said it has been a very long time since she has had a glass of wine and did not take her medication that day. We had a very lengthy conversation about this today. extensive counseling was provided. She has not been using her sleep medication, Remeron 15 mg. She has not been impressed with the medication. We had a very lengthy conversation about sleep medications today. She would like to try OTC melatonin. We will discontinue Remeron today. I will have her follow-up in 3 months for further evaluation and recommendation. Her current medication regimen consists of Percocet 10/325 QID PRN and Remeron 15 mg nightly. Medications are helping with pain control and quality of life. Her pain is 3-5/10 with medication and 10/10 without. Pt describes pain as aching, sharp, and radiating. Aggravating factors include standing, sitting, walking, while alleviated by rest sitting, rest lying down, avoiding painful activities. Current treatment is helping to improve general activity, mood, walking, sleep, enjoyment of life    She  is otherwise doing well with no other complaints today. She denies any adverse events including nausea, vomiting, dizziness, increased constipation, hallucinations, or seizures. Because the patient's current regimen places him/her at increased risk for possible overdose, a prescription for naloxone nasal spray has been provided.   The patient understands that this medication is only to be used in the setting of a possible overdose and that inadvertent use of this medication could precipitate overt withdrawal.         Allergies   Allergen Reactions    Morphine Unknown (comments)     Hallucinations  Pt. Denies allergy         Past Surgical History:   Procedure Laterality Date    HX APPENDECTOMY  1962    HX BACK SURGERY  1986    PHLD; L4-L5    HX GASTRIC BYPASS  2009    HX HYSTERECTOMY  1983    uterine cancer, stage 1    HX KNEE REPLACEMENT  2005    right    HX VISHAL AND BSO           Review of Systems   Constitutional: Negative for chills, fever and weight loss. Eyes: Negative for blurred vision and double vision. Respiratory: Negative for shortness of breath and wheezing. Cardiovascular: Negative for chest pain and palpitations. Gastrointestinal: Negative for constipation, diarrhea, heartburn, nausea and vomiting. Musculoskeletal: Positive for back pain, joint pain and neck pain. Skin: Negative for itching and rash. Neurological: Negative for dizziness and headaches. Psychiatric/Behavioral: Negative for depression. The patient is not nervous/anxious. Physical Exam   Constitutional: She is oriented to person, place, and time and well-developed, well-nourished, and in no distress. No distress. HENT:   Head: Normocephalic and atraumatic. Neck: Normal range of motion. Pulmonary/Chest: Effort normal.   Musculoskeletal: Normal range of motion. Neurological: She is alert and oriented to person, place, and time. She has normal reflexes. Gait normal.   Skin: Skin is warm and dry. She is not diaphoretic. No erythema. Psychiatric: Memory, affect and judgment normal.       ASSESSMENT:    Encounter Diagnoses   Name Primary?     Encounter for long-term (current) use of high-risk medication Yes    Chronic left-sided low back pain without sciatica     Irritable bowel syndrome with diarrhea     Postlaminectomy syndrome, lumbar region     Degeneration of lumbar or lumbosacral intervertebral disc  Pain in joint, pelvic region and thigh, left     Trochanteric bursitis of left hip     Neck pain     Degeneration of cervical intervertebral disc     Chronic pain syndrome     Chronic insomnia     Depression with anxiety         [place] Prescription Monitoring Program was reviewed which does not demonstrate aberrancies and/or inconsistencies with regard to the historical prescribing of controlled medications to this patient by other providers. PLAN / Pt Instructions:  1. Continue current plan with no evidence of addiction or diversion. Stable on current medication without adverse events. 2. Refill oxycodone 10/325 mg up to 4 times daily as needed for pain. 3. Discontinue Remeron. She is going to try OTC Melatonin. 4. Naloxone 4 mg nasal spray for opioid induced respiratory depression emergency only. 5. Discussed risks of addiction, dependency, and opioid induced hyperalgesia. 6. Return to clinic in 3 months        Orders Placed This Encounter    DRUG SCREEN    AMB POC DRUG SCREEN ()    oxyCODONE-acetaminophen (PERCOCET 10)  mg per tablet     Sig: Take 1 Tab by mouth four (4) times daily as needed for Pain for up to 30 days. Dispense:  120 Tab     Refill:  0    oxyCODONE-acetaminophen (PERCOCET 10)  mg per tablet     Sig: Take 1 Tab by mouth four (4) times daily as needed for Pain for up to 30 days. Dispense:  120 Tab     Refill:  0    oxyCODONE-acetaminophen (PERCOCET 10)  mg per tablet     Sig: Take 1 Tab by mouth every six (6) hours as needed for Pain for up to 20 doses. Max Daily Amount: 4 Tabs. Dispense:  120 Tab     Refill:  0       Pain medications prescribed with the objective of pain relief and improved physical and psychosocial function in this patient.     Spent 25 minutes with patient today reviewing the treatment plan, goals of treatment plan, and limitations of the treatment plan, to include the potential for side effects from medications and procedures. Nba Cuellarma 7/19/2017     Note: Please excuse any typographical errors. Voice recognition software was used for this note and may cause mistakes.

## 2017-07-19 NOTE — MR AVS SNAPSHOT
Visit Information Date & Time Provider Department Dept. Phone Encounter #  
 7/19/2017  3:00 PM [facility] [phone] [id number] Follow-up Instructions Return in about 3 months (around 10/19/2017). Upcoming Health Maintenance Date Due Hepatitis C Screening 1950 DTaP/Tdap/Td series (1 - Tdap) 5/28/1969 ZOSTER VACCINE AGE 60> 5/28/2008 GLAUCOMA SCREENING Q2Y 5/28/2013 INFLUENZA AGE 9 TO ADULT 8/1/2017 Pneumococcal 65+ Low/Medium Risk (2 of 2 - PPSV23) 9/12/2017 MEDICARE YEARLY EXAM 9/13/2017 COLONOSCOPY 4/26/2019 BREAST CANCER SCRN MAMMOGRAM 6/1/2019 Allergies as of 7/19/2017  Review Complete On: 7/19/2017 By: ELI Batres Severity Noted Reaction Type Reactions Morphine    Unknown (comments) Hallucinations Pt. Denies allergy Current Immunizations  Reviewed on 9/12/2016 Name Date Influenza High Dose Vaccine PF 9/12/2016 Pneumococcal Conjugate (PCV-13) 9/12/2016 Not reviewed this visit You Were Diagnosed With   
  
 Codes Comments Encounter for long-term (current) use of high-risk medication    -  Primary ICD-10-CM: H22.862 ICD-9-CM: V58.69 Chronic left-sided low back pain without sciatica     ICD-10-CM: M54.5, G89.29 ICD-9-CM: 724.2, 338.29 Irritable bowel syndrome with diarrhea     ICD-10-CM: K58.0 ICD-9-CM: 149.7 Postlaminectomy syndrome, lumbar region     ICD-10-CM: M96.1 ICD-9-CM: 722.83 Degeneration of lumbar or lumbosacral intervertebral disc     ICD-10-CM: M51.37 
ICD-9-CM: 722.52 Pain in joint, pelvic region and thigh, left     ICD-10-CM: M25.552 ICD-9-CM: 719.45 Trochanteric bursitis of left hip     ICD-10-CM: M70.62 ICD-9-CM: 726.5 Neck pain     ICD-10-CM: M54.2 ICD-9-CM: 723.1 Degeneration of cervical intervertebral disc     ICD-10-CM: M50.30 ICD-9-CM: 722.4 Chronic pain syndrome     ICD-10-CM: G89.4 ICD-9-CM: 338. 4 Chronic insomnia     ICD-10-CM: F51.04 
ICD-9-CM: 780.52 Depression with anxiety     ICD-10-CM: F41.8 ICD-9-CM: 300.4 Vitals BP Pulse Temp Resp Weight(growth percentile) BMI  
 175/74 72 98.1 °F (36.7 °C) 16 128 lb (58.1 kg) 25 kg/m2 OB Status Smoking Status Hysterectomy Current Every Day Smoker Vitals History BMI and BSA Data Body Mass Index Body Surface Area  
 25 kg/m 2 1.57 m 2 Preferred Pharmacy Pharmacy Name Phone [facility] [address] [place] [address] [phone] Your Updated Medication List  
  
   
This list is accurate as of: 7/19/17  4:06 PM.  Always use your most recent med list. amLODIPine 5 mg tablet Commonly known as:  Patrice Ape TAKE 1 TABLET BY MOUTH DAILY  
  
 ARIPiprazole 10 mg tablet Commonly known as:  ABILIFY Take 1/2 to 1 tablet by mouth daily as directed  
  
 azelastine 137 mcg (0.1 %) nasal spray Commonly known as:  ASTELIN  
1 Spray by Both Nostrils route two (2) times a day. Use in each nostril as directed  
  
 carvedilol 12.5 mg tablet Commonly known as:  COREG  
TAKE 1 TABLET BY MOUTH TWICE DAILY WITH MEALS  
  
 cloNIDine HCl 0.1 mg tablet Commonly known as:  CATAPRES  
TAKE 1 TABLET BY MOUTH TWICE DAILY  
  
 colestipol 1 gram tablet Commonly known as:  COLESTID Take 1 g by mouth two (2) times a day. cyanocobalamin 1,000 mcg tablet Take 5,000 mcg by mouth daily. dicyclomine 10 mg capsule Commonly known as:  BENTYL Take 10 mg by mouth 4 times daily (before meals and nightly). folic acid 268 mcg tablet Take 400 mcg by mouth daily. l-methylfolate 15 mg tablet Commonly known as:  Alphonsus Salazar Take 15 mg by mouth daily. LORazepam 0.5 mg tablet Commonly known as:  ATIVAN Take 0.5 Tabs by mouth two (2) times daily as needed. mirtazapine 15 mg tablet Commonly known as:  REMERON  
TAKE 1 TABLET BY MOUTH EVERY NIGHT MULTIVITAMIN PO Take  by mouth.  
  
 naloxone 4 mg/actuation Spry 4 mg by Nasal route as needed for up to 2 doses. Indications: OPIOID TOXICITY  
  
 * oxyCODONE-acetaminophen  mg per tablet Commonly known as:  PERCOCET 10 Take 1 Tab by mouth four (4) times daily as needed for Pain for up to 30 days. Start taking on:  7/31/2017 * oxyCODONE-acetaminophen  mg per tablet Commonly known as:  PERCOCET 10 Take 1 Tab by mouth four (4) times daily as needed for Pain for up to 30 days. Start taking on:  8/30/2017 * oxyCODONE-acetaminophen  mg per tablet Commonly known as:  PERCOCET 10 Take 1 Tab by mouth every six (6) hours as needed for Pain for up to 20 doses. Max Daily Amount: 4 Tabs. Start taking on:  9/29/2017 potassium 99 mg tablet Take 99 mg by mouth daily. risedronate 35 mg tablet Commonly known as:  ACTONEL Take 1 tablet each week ZANTAC 150 mg tablet Generic drug:  raNITIdine Take 150 mg by mouth two (2) times a day. ZOLOFT 100 mg tablet Generic drug:  sertraline Take 200 mg by mouth daily. * Notice: This list has 3 medication(s) that are the same as other medications prescribed for you. Read the directions carefully, and ask your doctor or other care provider to review them with you. Prescriptions Printed Refills  
 oxyCODONE-acetaminophen (PERCOCET 10)  mg per tablet 0 Starting on: 7/31/2017 Sig: Take 1 Tab by mouth four (4) times daily as needed for Pain for up to 30 days. Class: Print Route: Oral  
 oxyCODONE-acetaminophen (PERCOCET 10)  mg per tablet 0 Starting on: 8/30/2017 Sig: Take 1 Tab by mouth four (4) times daily as needed for Pain for up to 30 days. Class: Print  Route: Oral  
 oxyCODONE-acetaminophen (PERCOCET 10)  mg per tablet 0  
 Starting on: 9/29/2017 Sig: Take 1 Tab by mouth every six (6) hours as needed for Pain for up to 20 doses. Max Daily Amount: 4 Tabs. Class: Print Route: Oral  
  
We Performed the Following AMB POC DRUG SCREEN () [ [facility]] DRUG SCREEN [[id number] Custom] Follow-up Instructions Return in about 3 months (around 10/19/2017). Patient Instructions 1. Continue current plan with no evidence of addiction or diversion. Stable on current medication without adverse events. 2. Refill oxycodone 10/325 mg up to 4 times daily as needed for pain. 3. Discontinue Remeron. She is going to try Melatonin. 4. Discussed risks of addiction, dependency, and opioid induced hyperalgesia. 5. Return to clinic in 3 months Please provide this summary of care documentation to your next provider. Your primary care clinician is listed as Wilber Curtis. If you have any questions after today's visit, please call [phone].

## 2017-07-19 NOTE — PATIENT INSTRUCTIONS
1. Continue current plan with no evidence of addiction or diversion. Stable on current medication without adverse events. 2. Refill oxycodone 10/325 mg up to 4 times daily as needed for pain. 3. Discontinue Remeron. She is going to try Melatonin. 4. Discussed risks of addiction, dependency, and opioid induced hyperalgesia.   5. Return to clinic in 3 months

## 2017-08-13 DIAGNOSIS — G47.00 INSOMNIA, UNSPECIFIED TYPE: ICD-10-CM

## 2017-08-13 RX ORDER — AMLODIPINE BESYLATE 5 MG/1
TABLET ORAL
Qty: 90 TAB | Refills: 0 | Status: SHIPPED | OUTPATIENT
Start: 2017-08-13 | End: 2017-12-24 | Stop reason: SDUPTHER

## 2017-08-14 RX ORDER — MIRTAZAPINE 15 MG/1
TABLET, FILM COATED ORAL
Qty: 90 TAB | Refills: 0 | Status: SHIPPED | OUTPATIENT
Start: 2017-08-14 | End: 2018-04-23 | Stop reason: SDUPTHER

## 2017-08-24 RX ORDER — CARVEDILOL 12.5 MG/1
TABLET ORAL
Qty: 180 TAB | Refills: 0 | Status: SHIPPED | OUTPATIENT
Start: 2017-08-24 | End: 2017-11-22 | Stop reason: SDUPTHER

## 2017-09-19 DIAGNOSIS — I10 BENIGN ESSENTIAL HYPERTENSION: ICD-10-CM

## 2017-09-19 RX ORDER — CLONIDINE HYDROCHLORIDE 0.1 MG/1
TABLET ORAL
Qty: 180 TAB | Refills: 0 | Status: SHIPPED | OUTPATIENT
Start: 2017-09-19 | End: 2018-01-06 | Stop reason: SDUPTHER

## 2017-10-12 ENCOUNTER — OFFICE VISIT (OUTPATIENT)
Dept: PAIN MANAGEMENT | Age: 67
End: 2017-10-12

## 2017-10-12 VITALS
WEIGHT: 128 LBS | BODY MASS INDEX: 25.13 KG/M2 | SYSTOLIC BLOOD PRESSURE: 168 MMHG | RESPIRATION RATE: 10 BRPM | HEART RATE: 63 BPM | TEMPERATURE: 96.8 F | HEIGHT: 60 IN | DIASTOLIC BLOOD PRESSURE: 76 MMHG

## 2017-10-12 DIAGNOSIS — M96.1 POSTLAMINECTOMY SYNDROME, LUMBAR REGION: ICD-10-CM

## 2017-10-12 DIAGNOSIS — M50.30 DEGENERATION OF CERVICAL INTERVERTEBRAL DISC: ICD-10-CM

## 2017-10-12 DIAGNOSIS — M51.37 DEGENERATION OF LUMBAR OR LUMBOSACRAL INTERVERTEBRAL DISC: ICD-10-CM

## 2017-10-12 DIAGNOSIS — G89.29 CHRONIC LEFT-SIDED LOW BACK PAIN WITHOUT SCIATICA: ICD-10-CM

## 2017-10-12 DIAGNOSIS — F51.04 CHRONIC INSOMNIA: ICD-10-CM

## 2017-10-12 DIAGNOSIS — M25.552 PAIN IN JOINT, PELVIC REGION AND THIGH, LEFT: ICD-10-CM

## 2017-10-12 DIAGNOSIS — M54.2 NECK PAIN: ICD-10-CM

## 2017-10-12 DIAGNOSIS — Z79.899 ENCOUNTER FOR LONG-TERM (CURRENT) USE OF HIGH-RISK MEDICATION: ICD-10-CM

## 2017-10-12 DIAGNOSIS — F41.8 DEPRESSION WITH ANXIETY: ICD-10-CM

## 2017-10-12 DIAGNOSIS — G89.4 CHRONIC PAIN SYNDROME: ICD-10-CM

## 2017-10-12 DIAGNOSIS — K58.0 IRRITABLE BOWEL SYNDROME WITH DIARRHEA: ICD-10-CM

## 2017-10-12 DIAGNOSIS — M54.50 CHRONIC LEFT-SIDED LOW BACK PAIN WITHOUT SCIATICA: ICD-10-CM

## 2017-10-12 DIAGNOSIS — M70.62 TROCHANTERIC BURSITIS OF LEFT HIP: ICD-10-CM

## 2017-10-12 RX ORDER — OXYCODONE AND ACETAMINOPHEN 10; 325 MG/1; MG/1
1 TABLET ORAL
Qty: 120 TAB | Refills: 0 | Status: SHIPPED | OUTPATIENT
Start: 2017-11-04 | End: 2018-01-19 | Stop reason: SDUPTHER

## 2017-10-12 RX ORDER — OXYCODONE AND ACETAMINOPHEN 10; 325 MG/1; MG/1
1 TABLET ORAL
Qty: 120 TAB | Refills: 0 | Status: SHIPPED | OUTPATIENT
Start: 2017-12-03 | End: 2018-01-19 | Stop reason: SDUPTHER

## 2017-10-12 RX ORDER — OXYCODONE AND ACETAMINOPHEN 10; 325 MG/1; MG/1
1 TABLET ORAL
Qty: 120 TAB | Refills: 0 | Status: SHIPPED | OUTPATIENT
Start: 2018-01-02 | End: 2018-01-03

## 2017-10-12 NOTE — PATIENT INSTRUCTIONS
1. Continue current plan with no evidence of addiction or diversion. Stable on current medication without adverse events. 2. Refill oxycodone 10/325 mg up to 4 times daily as needed for pain. 3. Continue OTC melatonin as needed. 4. Naloxone 4 mg nasal spray for opioid induced respiratory depression emergency only. 5. Discussed risks of addiction, dependency, and opioid induced hyperalgesia.   6. Return to clinic in 3 months

## 2017-10-12 NOTE — PROGRESS NOTES
Nursing Notes    Patient presents to the office today in follow-up. Patient rates her pain at 7/10 on the numerical pain scale. Reviewed medications with counts as follows:    Rx Date filled Qty Dispensed Pill Count Last Dose Short   Percocet 10-325mg 10/05/17 120 95.5 today                                     Comments:     POC UDS was not performed in office today    Any new labs or imaging since last appointment? NO    Have you been to an emergency room (ER) or urgent care clinic since your last visit? NO            Have you been hospitalized since your last visit? NO     If yes, where, when, and reason for visit? Have you seen or consulted any other health care providers outside of the [address] [place] [address]  since your last visit? NO     If yes, where, when, and reason for visit? HM deferred to pcp.

## 2017-10-12 NOTE — PROGRESS NOTES
Kimberlee Friend returns today for f/u of chronic severe pain involving the cervical lumbar spine as well as both hips. She suffers from widespread osteoarthritis as well as a post lumbar laminectomy pain syndrome and trochanteric bursitis. Ms. Lucas Trinh continues unchanged since last visit. She has been doing very well with her current treatment plan which has been offering significant pain control. She is happy with her progress. We discontinued Remeron last visit and she has been using OTC melatonin as needed. She says that this has been working well but does have a hangover effect into the next day, but as of now is tolerable. She would like to continue with her current treatment plan with no changes today. I will have her follow-up in 3 months for further evaluation and recommendation. Her current medication regimen consists of Percocet 10/325 QID PRN and OTC melatonin for sleep. Medications are helping with pain control and quality of life. Her pain is 3-5/10 with medication and 10/10 without. Pt describes pain as aching, sharp, and radiating. Aggravating factors include standing, sitting, walking, while alleviated by rest sitting, rest lying down, avoiding painful activities. Current treatment is helping to improve general activity, mood, walking, sleep, enjoyment of life    She  is otherwise doing well with no other complaints today. She denies any adverse events including nausea, vomiting, dizziness, increased constipation, hallucinations, or seizures. Because the patient's current regimen places him/her at increased risk for possible overdose, a prescription for naloxone nasal spray has been provided. The patient understands that this medication is only to be used in the setting of a possible overdose and that inadvertent use of this medication could precipitate overt withdrawal.         Allergies   Allergen Reactions    Morphine Unknown (comments)     Hallucinations  Pt.  Denies allergy Past Surgical History:   Procedure Laterality Date    HX APPENDECTOMY  1962    HX BACK SURGERY  1986    PHLD; L4-L5    HX GASTRIC BYPASS  2009    HX HYSTERECTOMY  1983    uterine cancer, stage 1    HX KNEE REPLACEMENT  2005    right    HX VISHAL AND BSO           Review of Systems   Constitutional: Negative for chills, fever and weight loss. Eyes: Negative for blurred vision and double vision. Respiratory: Negative for shortness of breath and wheezing. Cardiovascular: Negative for chest pain and palpitations. Gastrointestinal: Negative for constipation, diarrhea, heartburn, nausea and vomiting. Musculoskeletal: Positive for back pain, joint pain and neck pain. Skin: Negative for itching and rash. Neurological: Negative for dizziness and headaches. Psychiatric/Behavioral: Negative for depression. The patient is not nervous/anxious. Physical Exam   Constitutional: She is oriented to person, place, and time and well-developed, well-nourished, and in no distress. No distress. HENT:   Head: Normocephalic and atraumatic. Neck: Normal range of motion. Pulmonary/Chest: Effort normal.   Musculoskeletal: Normal range of motion. Neurological: She is alert and oriented to person, place, and time. She has normal reflexes. Gait normal.   Skin: Skin is warm and dry. She is not diaphoretic. No erythema. Psychiatric: Memory, affect and judgment normal.       ASSESSMENT:    Encounter Diagnoses   Name Primary?     Chronic left-sided low back pain without sciatica     Irritable bowel syndrome with diarrhea     Postlaminectomy syndrome, lumbar region     Degeneration of lumbar or lumbosacral intervertebral disc     Pain in joint, pelvic region and thigh, left     Trochanteric bursitis of left hip     Neck pain     Degeneration of cervical intervertebral disc     Encounter for long-term (current) use of high-risk medication     Chronic pain syndrome     Chronic insomnia     Depression with anxiety         [place] Prescription Monitoring Program was reviewed which does not demonstrate aberrancies and/or inconsistencies with regard to the historical prescribing of controlled medications to this patient by other providers. PLAN / Pt Instructions:  1. Continue current plan with no evidence of addiction or diversion. Stable on current medication without adverse events. 2. Refill oxycodone 10/325 mg up to 4 times daily as needed for pain. 3. Continue OTC melatonin as needed. 4. Naloxone 4 mg nasal spray for opioid induced respiratory depression emergency only. 5. Discussed risks of addiction, dependency, and opioid induced hyperalgesia. 6. Return to clinic in 3 months        Orders Placed This Encounter    oxyCODONE-acetaminophen (PERCOCET 10)  mg per tablet     Sig: Take 1 Tab by mouth four (4) times daily as needed for Pain for up to 20 doses. Max Daily Amount: 4 Tabs. Dispense:  120 Tab     Refill:  0    oxyCODONE-acetaminophen (PERCOCET 10)  mg per tablet     Sig: Take 1 Tab by mouth four (4) times daily as needed for Pain for up to 30 days. Dispense:  120 Tab     Refill:  0    oxyCODONE-acetaminophen (PERCOCET 10)  mg per tablet     Sig: Take 1 Tab by mouth four (4) times daily as needed for Pain for up to 30 days. Dispense:  120 Tab     Refill:  0       Pain medications prescribed with the objective of pain relief and improved physical and psychosocial function in this patient. Spent 25 minutes with patient today reviewing the treatment plan, goals of treatment plan, and limitations of the treatment plan, to include the potential for side effects from medications and procedures. Wendy Juan 10/12/2017     Note: Please excuse any typographical errors. Voice recognition software was used for this note and may cause mistakes.

## 2017-10-12 NOTE — MR AVS SNAPSHOT
Visit Information Date & Time Provider Department Dept. Phone Encounter #  
 10/12/2017  3:00 PM Marisol Navarro [facility] [phone] [id number] Upcoming Health Maintenance Date Due Hepatitis C Screening 1950 DTaP/Tdap/Td series (1 - Tdap) 5/28/1969 ZOSTER VACCINE AGE 60> 3/28/2008 GLAUCOMA SCREENING Q2Y 5/28/2013 INFLUENZA AGE 9 TO ADULT 8/1/2017 Pneumococcal 65+ Low/Medium Risk (2 of 2 - PPSV23) 9/12/2017 MEDICARE YEARLY EXAM 9/13/2017 COLONOSCOPY 4/26/2019 BREAST CANCER SCRN MAMMOGRAM 6/1/2019 Allergies as of 10/12/2017  Review Complete On: 10/12/2017 By: ELI Mtz Severity Noted Reaction Type Reactions Morphine    Unknown (comments) Hallucinations Pt. Denies allergy Current Immunizations  Reviewed on 9/12/2016 Name Date Influenza High Dose Vaccine PF 9/12/2016 Pneumococcal Conjugate (PCV-13) 9/12/2016 Not reviewed this visit You Were Diagnosed With   
  
 Codes Comments Chronic left-sided low back pain without sciatica     ICD-10-CM: M54.5, G89.29 ICD-9-CM: 724.2, 338.29 Irritable bowel syndrome with diarrhea     ICD-10-CM: K58.0 ICD-9-CM: 518.8 Postlaminectomy syndrome, lumbar region     ICD-10-CM: M96.1 ICD-9-CM: 722.83 Degeneration of lumbar or lumbosacral intervertebral disc     ICD-10-CM: M51.37 
ICD-9-CM: 722.52 Pain in joint, pelvic region and thigh, left     ICD-10-CM: M25.552 ICD-9-CM: 719.45 Trochanteric bursitis of left hip     ICD-10-CM: M70.62 ICD-9-CM: 726.5 Neck pain     ICD-10-CM: M54.2 ICD-9-CM: 723.1 Degeneration of cervical intervertebral disc     ICD-10-CM: M50.30 ICD-9-CM: 722.4 Encounter for long-term (current) use of high-risk medication     ICD-10-CM: Z79.899 ICD-9-CM: V58.69 Chronic pain syndrome     ICD-10-CM: G89.4 ICD-9-CM: 338. 4  Chronic insomnia     ICD-10-CM: F51.04 
 ICD-9-CM: 780.52 Depression with anxiety     ICD-10-CM: F41.8 ICD-9-CM: 300.4 Vitals BP Pulse Temp Resp Height(growth percentile) Weight(growth percentile) 168/76 63 96.8 °F (36 °C) 10 5' (1.524 m) 128 lb (58.1 kg) BMI OB Status Smoking Status 25 kg/m2 Hysterectomy Current Every Day Smoker BMI and BSA Data Body Mass Index Body Surface Area  
 25 kg/m 2 1.57 m 2 Preferred Pharmacy Pharmacy Name Phone [facility] Tresa Salamanca [address] [place] [address] [phone] Your Updated Medication List  
  
   
This list is accurate as of: 10/12/17  3:51 PM.  Always use your most recent med list. amLODIPine 5 mg tablet Commonly known as:  Juana Pace TAKE 1 TABLET BY MOUTH DAILY  
  
 ARIPiprazole 10 mg tablet Commonly known as:  ABILIFY Take 1/2 to 1 tablet by mouth daily as directed  
  
 azelastine 137 mcg (0.1 %) nasal spray Commonly known as:  ASTELIN  
1 Spray by Both Nostrils route two (2) times a day. Use in each nostril as directed  
  
 carvedilol 12.5 mg tablet Commonly known as:  COREG  
TAKE 1 TABLET BY MOUTH TWICE DAILY WITH MEALS  
  
 cloNIDine HCl 0.1 mg tablet Commonly known as:  CATAPRES  
TAKE 1 TABLET BY MOUTH TWICE DAILY  
  
 colestipol 1 gram tablet Commonly known as:  COLESTID Take 1 g by mouth two (2) times a day. cyanocobalamin 1,000 mcg tablet Take 5,000 mcg by mouth daily. dicyclomine 10 mg capsule Commonly known as:  BENTYL Take 10 mg by mouth 4 times daily (before meals and nightly). folic acid 770 mcg tablet Take 400 mcg by mouth daily. l-methylfolate 15 mg tablet Commonly known as:  Mena Eaton Take 15 mg by mouth daily. LORazepam 0.5 mg tablet Commonly known as:  ATIVAN Take 0.5 Tabs by mouth two (2) times daily as needed. mirtazapine 15 mg tablet Commonly known as:  Beatrice Christophe  
 TAKE 1 TABLET BY MOUTH EVERY NIGHT MULTIVITAMIN PO Take  by mouth.  
  
 naloxone 4 mg/actuation nasal spray Commonly known as:  NARCAN  
4 mg by Nasal route as needed for up to 2 doses. Indications: OPIOID TOXICITY  
  
 * oxyCODONE-acetaminophen  mg per tablet Commonly known as:  PERCOCET 10 Take 1 Tab by mouth four (4) times daily as needed for Pain for up to 20 doses. Max Daily Amount: 4 Tabs. Start taking on:  11/4/2017 * oxyCODONE-acetaminophen  mg per tablet Commonly known as:  PERCOCET 10 Take 1 Tab by mouth four (4) times daily as needed for Pain for up to 30 days. Start taking on:  12/3/2017 * oxyCODONE-acetaminophen  mg per tablet Commonly known as:  PERCOCET 10 Take 1 Tab by mouth four (4) times daily as needed for Pain for up to 30 days. Start taking on:  1/2/2018 potassium 99 mg tablet Take 99 mg by mouth daily. risedronate 35 mg tablet Commonly known as:  ACTONEL Take 1 tablet each week ZANTAC 150 mg tablet Generic drug:  raNITIdine Take 150 mg by mouth two (2) times a day. ZOLOFT 100 mg tablet Generic drug:  sertraline Take 200 mg by mouth daily. * Notice: This list has 3 medication(s) that are the same as other medications prescribed for you. Read the directions carefully, and ask your doctor or other care provider to review them with you. Prescriptions Printed Refills  
 oxyCODONE-acetaminophen (PERCOCET 10)  mg per tablet 0 Starting on: 11/4/2017 Sig: Take 1 Tab by mouth four (4) times daily as needed for Pain for up to 20 doses. Max Daily Amount: 4 Tabs. Class: Print Route: Oral  
 oxyCODONE-acetaminophen (PERCOCET 10)  mg per tablet 0 Starting on: 12/3/2017 Sig: Take 1 Tab by mouth four (4) times daily as needed for Pain for up to 30 days. Class: Print  Route: Oral  
 oxyCODONE-acetaminophen (PERCOCET 10)  mg per tablet 0  
 Starting on: 1/2/2018 Sig: Take 1 Tab by mouth four (4) times daily as needed for Pain for up to 30 days. Class: Print Route: Oral  
  
 Please provide this summary of care documentation to your next provider. Your primary care clinician is listed as Zigmund Speaker. If you have any questions after today's visit, please call [phone].

## 2017-11-22 RX ORDER — CARVEDILOL 12.5 MG/1
TABLET ORAL
Qty: 180 TAB | Refills: 0 | Status: SHIPPED | OUTPATIENT
Start: 2017-11-22 | End: 2018-02-21 | Stop reason: SDUPTHER

## 2017-12-26 RX ORDER — AMLODIPINE BESYLATE 5 MG/1
TABLET ORAL
Qty: 90 TAB | Refills: 0 | Status: SHIPPED | OUTPATIENT
Start: 2017-12-26 | End: 2018-03-30 | Stop reason: SDUPTHER

## 2018-01-03 ENCOUNTER — OFFICE VISIT (OUTPATIENT)
Dept: INTERNAL MEDICINE CLINIC | Age: 68
End: 2018-01-03

## 2018-01-03 VITALS
TEMPERATURE: 98 F | OXYGEN SATURATION: 98 % | HEIGHT: 60 IN | HEART RATE: 66 BPM | BODY MASS INDEX: 27.48 KG/M2 | DIASTOLIC BLOOD PRESSURE: 68 MMHG | SYSTOLIC BLOOD PRESSURE: 142 MMHG | WEIGHT: 140 LBS | RESPIRATION RATE: 16 BRPM

## 2018-01-03 DIAGNOSIS — H93.11 TINNITUS OF RIGHT EAR: ICD-10-CM

## 2018-01-03 DIAGNOSIS — H92.01 OTALGIA, RIGHT EAR: Primary | ICD-10-CM

## 2018-01-03 DIAGNOSIS — Z23 ENCOUNTER FOR IMMUNIZATION: ICD-10-CM

## 2018-01-03 DIAGNOSIS — H61.21 CERUMEN DEBRIS ON TYMPANIC MEMBRANE OF RIGHT EAR: ICD-10-CM

## 2018-01-03 PROBLEM — F33.9 RECURRENT DEPRESSION (HCC): Status: ACTIVE | Noted: 2018-01-03

## 2018-01-03 NOTE — PROGRESS NOTES
Nakia Castro is a 69 y.o. female presenting today for Ear Fullness and Immunization/Injection  . HPI:  Nakia Castro presents to the office today for right ear otalgia, decreased hearing and buzzing sounds x 2 weeks. She does have mild nasal congestion but denies any cough. Review of Systems   HENT: Positive for ear pain, hearing loss and tinnitus. Negative for congestion. Respiratory: Negative for cough. Cardiovascular: Negative for chest pain and palpitations. Allergies   Allergen Reactions    Morphine Unknown (comments)     Hallucinations  Pt. Denies allergy         Current Outpatient Prescriptions   Medication Sig Dispense Refill    amLODIPine (NORVASC) 5 mg tablet TAKE 1 TABLET BY MOUTH DAILY 90 Tab 0    carvedilol (COREG) 12.5 mg tablet TAKE 1 TABLET BY MOUTH TWICE DAILY WITH MEALS 180 Tab 0    oxyCODONE-acetaminophen (PERCOCET 10)  mg per tablet Take 1 Tab by mouth four (4) times daily as needed for Pain for up to 20 doses. Max Daily Amount: 4 Tabs. 120 Tab 0    cloNIDine HCl (CATAPRES) 0.1 mg tablet TAKE 1 TABLET BY MOUTH TWICE DAILY 180 Tab 0    mirtazapine (REMERON) 15 mg tablet TAKE 1 TABLET BY MOUTH EVERY NIGHT 90 Tab 0    risedronate (ACTONEL) 35 mg tablet Take 1 tablet each week 12 Tab 3    potassium 99 mg tablet Take 99 mg by mouth daily.  dicyclomine (BENTYL) 10 mg capsule Take 10 mg by mouth 4 times daily (before meals and nightly).  ARIPiprazole (ABILIFY) 10 mg tablet Take 1/2 to 1 tablet by mouth daily as directed      folic acid 134 mcg tablet Take 400 mcg by mouth daily.  raNITIdine (ZANTAC) 150 mg tablet Take 150 mg by mouth two (2) times a day.  naloxone 4 mg/actuation spry 4 mg by Nasal route as needed for up to 2 doses. Indications: OPIOID TOXICITY 1 Box 1    l-methylfolate (DEPLIN) 15 mg tablet Take 15 mg by mouth daily.  colestipol (COLESTID) 1 gram tablet Take 1 g by mouth two (2) times a day.       cyanocobalamin 1,000 mcg tablet Take 5,000 mcg by mouth daily.  LORazepam (ATIVAN) 0.5 mg tablet Take 0.5 Tabs by mouth two (2) times daily as needed. 1    azelastine (ASTELIN) 137 mcg (0.1 %) nasal spray 1 [place] by Both Nostrils route two (2) times a day. Use in each nostril as directed 1 Bottle 2    MULTIVITAMIN PO Take  by mouth.  sertraline (ZOLOFT) 100 mg tablet Take 200 mg by mouth daily.  oxyCODONE-acetaminophen (PERCOCET 10)  mg per tablet Take 1 Tab by mouth four (4) times daily as needed for Pain for up to 30 days. 120 Tab 0       Past Medical History:   Diagnosis Date    Abdominal pain     Benign essential hypertension     Cancer (HCC)     uterine    Depressive disorder     Fatigue     Hypertension     Insomnia     NATHANAEL (obstructive sleep apnea)     does not use cpap machine    Osteoarthritis     Peptic ulcer 2012    Spinal stenosis     with chronic pain       Past Surgical History:   Procedure Laterality Date    HX APPENDECTOMY  1962    HX BACK SURGERY  1986    PHLD; L4-L5    HX GASTRIC BYPASS  2009    HX HYSTERECTOMY  1983    uterine cancer, stage 1    HX KNEE REPLACEMENT  2005    right    HX VISHAL AND BSO         Social History     Social History    Marital status:      Spouse name: N/A    Number of children: N/A    Years of education: N/A     Occupational History    Not on file.      Social History Main Topics    Smoking status: Current Every Day Smoker     Packs/day: 0.25     Types: Cigarettes    Smokeless tobacco: Never Used      Comment: 6 ciggerates per day    Alcohol use Yes      Comment: socially    Drug use: No      Comment: denies    Sexual activity: No     Other Topics Concern    Not on file     Social History Narrative       Patient does not have an advanced directive on file    Vitals:    01/03/18 0807   BP: 142/68   Pulse: 66   Resp: 16   Temp: 98 °F (36.7 °C)   TempSrc: Tympanic   SpO2: 98%   Weight: 140 lb (63.5 kg)   Height: 5' (1.524 m)   PainSc:   0 - No pain       Physical Exam   Constitutional: No distress. HENT:   Right Ear: Decreased hearing is noted. Left Ear: External ear normal.   Mouth/Throat: Oropharynx is clear and moist.   Cerumen debris   Cardiovascular: Normal rate, regular rhythm and normal heart sounds. Pulmonary/Chest: Effort normal and breath sounds normal.   Neurological: She is alert. Nursing note and vitals reviewed. No visits with results within 3 Month(s) from this visit. Latest known visit with results is:    Office Visit on 07/19/2017   Component Date Value Ref Range Status    AMPHETAMINES UR POC 07/19/2017 Negative   Final    COCAINE UR POC 07/19/2017 Negative   Final    MDMA/ECSTASY UR POC 07/19/2017 Negative   Final    METHADONE UR POC 07/19/2017 Negative   Final    METHAMPHETAMINE UR POC 07/19/2017 Negative   Final    METHYLPHENIDATE UR POC 07/19/2017 Negative   Final    OPIATES UR POC 07/19/2017 Negative   Final    OXYCODONE UR POC 07/19/2017 Presumptive Positive   Final    PHENCYCLIDINE UR POC 07/19/2017 Negative   Final    TRICYCLICS UR POC 48/76/0748 Negative   Final    BARBITURATES UR POC 07/19/2017 Negative   Final    BENZODIAZEPINES UR POC 07/19/2017 Negative   Final    CANNABINOIDS UR POC 07/19/2017 Negative   Final       .No results found for any visits on 01/03/18. Assessment / Plan:      ICD-10-CM ICD-9-CM    1. Otalgia, right ear H92.01 388.70    2. Tinnitus of right ear H93.11 388.30    3. Cerumen debris on tympanic membrane of right ear H61.21 380.4 REMOVAL IMPACTED CERUMEN IRRIGATION/LVG UNILAT   4. Encounter for immunization Z23 V03.89 INFLUENZA VIRUS VACCINE, HIGH DOSE SEASONAL, PRESERVATIVE FREE      ADMIN INFLUENZA VIRUS VAC     Irrigation AD- hearing improved  Will give letter for service dog due to recurrent depression      Follow-up Disposition:  Return if symptoms worsen or fail to improve. I asked the patient if she  had any questions and answered her  questions.   The patient stated that she understands the treatment plan and agrees with the treatment plan

## 2018-01-03 NOTE — PATIENT INSTRUCTIONS
Vaccine Information Statement    Influenza (Flu) Vaccine (Inactivated or Recombinant): What you need to know    Many Vaccine Information Statements are available in Kiswahili and other languages. See www.immunize.org/vis  Hojas de Información Sobre Vacunas están disponibles en Español y en muchos otros idiomas. Visite www.immunize.org/vis    1. Why get vaccinated? Influenza (flu) is a contagious disease that spreads around the [place] every year, usually between October and May. Flu is caused by influenza viruses, and is spread mainly by coughing, sneezing, and close contact. Anyone can get flu. Flu strikes suddenly and can last several days. Symptoms vary by age, but can include:   fever/chills   sore throat   muscle aches   fatigue   cough   headache    runny or stuffy nose    Flu can also lead to pneumonia and blood infections, and cause diarrhea and seizures in children. If you have a medical condition, such as heart or lung disease, flu can make it worse. Flu is more dangerous for some people. Infants and young children, people 72years of age and older, pregnant women, and people with certain health conditions or a weakened immune system are at greatest risk. Each year thousands of people in the [facility] die from flu, and many more are hospitalized. Flu vaccine can:   keep you from getting flu,   make flu less severe if you do get it, and   keep you from spreading flu to your family and other people. 2. Inactivated and recombinant flu vaccines    A dose of flu vaccine is recommended every flu season. Children 6 months through 6years of age may need two doses during the same flu season. Everyone else needs only one dose each flu season.        Some inactivated flu vaccines contain a very small amount of a mercury-based preservative called thimerosal. Studies have not shown thimerosal in vaccines to be harmful, but flu vaccines that do not contain thimerosal are available. There is no live flu virus in flu shots. They cannot cause the flu. There are many flu viruses, and they are always changing. Each year a new flu vaccine is made to protect against three or four viruses that are likely to cause disease in the upcoming flu season. But even when the vaccine doesnt exactly match these viruses, it may still provide some protection    Flu vaccine cannot prevent:   flu that is caused by a virus not covered by the vaccine, or   illnesses that look like flu but are not. It takes about 2 weeks for protection to develop after vaccination, and protection lasts through the flu season. 3. Some people should not get this vaccine    Tell the person who is giving you the vaccine:     If you have any severe, life-threatening allergies. If you ever had a life-threatening allergic reaction after a dose of flu vaccine, or have a severe allergy to any part of this vaccine, you may be advised not to get vaccinated. Most, but not all, types of flu vaccine contain a small amount of egg protein.  If you ever had Guillain-Barré Syndrome (also called GBS). Some people with a history of GBS should not get this vaccine. This should be discussed with your doctor.  If you are not feeling well. It is usually okay to get flu vaccine when you have a mild illness, but you might be asked to come back when you feel better. 4. Risks of a vaccine reaction    With any medicine, including vaccines, there is a chance of reactions. These are usually mild and go away on their own, but serious reactions are also possible. Most people who get a flu shot do not have any problems with it.      Minor problems following a flu shot include:    soreness, redness, or swelling where the shot was given     hoarseness   sore, red or itchy eyes   cough   fever   aches   headache   itching   fatigue  If these problems occur, they usually begin soon after the shot and last 1 or 2 days. More serious problems following a flu shot can include the following:     There may be a small increased risk of Guillain-Barré Syndrome (GBS) after inactivated flu vaccine. This risk has been estimated at 1 or 2 additional cases per million people vaccinated. This is much lower than the risk of severe complications from flu, which can be prevented by flu vaccine.  Young children who get the flu shot along with pneumococcal vaccine (PCV13) and/or DTaP vaccine at the same time might be slightly more likely to have a seizure caused by fever. Ask your doctor for more information. Tell your doctor if a child who is getting flu vaccine has ever had a seizure. Problems that could happen after any injected vaccine:      People sometimes faint after a medical procedure, including vaccination. Sitting or lying down for about 15 minutes can help prevent fainting, and injuries caused by a fall. Tell your doctor if you feel dizzy, or have vision changes or ringing in the ears.  Some people get severe pain in the shoulder and have difficulty moving the arm where a shot was given. This happens very rarely.  Any medication can cause a severe allergic reaction. Such reactions from a vaccine are very rare, estimated at about 1 in a million doses, and would happen within a few minutes to a few hours after the vaccination. As with any medicine, there is a very remote chance of a vaccine causing a serious injury or death. The safety of vaccines is always being monitored. For more information, visit: www.cdc.gov/vaccinesafety/    5. What if there is a serious reaction? What should I look for?  Look for anything that concerns you, such as signs of a severe allergic reaction, very high fever, or unusual behavior.     Signs of a severe allergic reaction can include hives, swelling of the face and throat, difficulty breathing, a fast heartbeat, dizziness, and weakness - usually within a few minutes to a few hours after the vaccination. What should I do?  If you think it is a severe allergic reaction or other emergency that cant wait, call 9-1-1 and get the person to the nearest hospital. Otherwise, call your doctor.  Reactions should be reported to the Vaccine Adverse Event Reporting System (VAERS). Your doctor should file this report, or you can do it yourself through  the VAERS web site at www.vaers. [facility].gov, or by calling 7-479.116.7535. VAERS does not give medical advice. 6. The National Vaccine Injury Compensation Program    The [facility] Vaccine Injury Compensation Program (VICP) is a federal program that was created to compensate people who may have been injured by certain vaccines. Persons who believe they may have been injured by a vaccine can learn about the program and about filing a claim by calling 0-400.539.1902 or visiting the [employer] website at www.[facility].gov/vaccinecompensation. There is a time limit to file a claim for compensation. 7. How can I learn more?  Ask your healthcare provider. He or she can give you the vaccine package insert or suggest other sources of information.  Call your local or state health department.  Contact the Centers for Disease Control and Prevention (CDC):  - Call 4-640.432.4488 (1-800-CDC-INFO) or  - Visit CDCs website at www.cdc.gov/flu    Vaccine Information Statement   Inactivated Influenza Vaccine   8/7/2015  42 SUNNY Agarwal [id number]    Department of Health and Human Services  Centers for Disease Control and Prevention    Office Use Only

## 2018-01-03 NOTE — PROGRESS NOTES
1. Have you been to the ER, urgent care clinic since your last visit? Hospitalized since your last visit? No    2. Have you seen or consulted any other health care providers outside of the [address] [place] [address] since your last visit? Include any pap smears or colon screening. Karley Vasquez is a 69 y.o. female who presents for routine immunizations. She denies any symptoms , reactions or allergies that would exclude them from being immunized today. Risks and adverse reactions were discussed and the VIS was given to them. All questions were addressed. She was observed for 15 min post injection. There were no reactions observed. Leida Sewell LPN    Ear irrigation performed per provider order, moderat amount of dark brown/yellow wax irrigated from right ear, patient reports feeling better and hearing improved.

## 2018-01-03 NOTE — LETTER
18 To:  Whom It May Concern RE:  Elis Valles :  1950 Please allow Ms Elis Valles to keep a service animal (cat) in her home to assist in alleviating her Depressive Disorder. We will be monitoring her closely and beilieve this will greatly improve her condition. Diagnosis:  Depressive Disorder. Thank you.  
 
      Sincerely, 
 
 
 
 
      Karie Bloch, NP

## 2018-01-06 DIAGNOSIS — I10 BENIGN ESSENTIAL HYPERTENSION: ICD-10-CM

## 2018-01-06 RX ORDER — CLONIDINE HYDROCHLORIDE 0.1 MG/1
TABLET ORAL
Qty: 180 TAB | Refills: 0 | Status: SHIPPED | OUTPATIENT
Start: 2018-01-06 | End: 2018-04-26 | Stop reason: SDUPTHER

## 2018-01-19 ENCOUNTER — OFFICE VISIT (OUTPATIENT)
Dept: PAIN MANAGEMENT | Age: 68
End: 2018-01-19

## 2018-01-19 VITALS
SYSTOLIC BLOOD PRESSURE: 170 MMHG | RESPIRATION RATE: 16 BRPM | HEART RATE: 80 BPM | BODY MASS INDEX: 27.34 KG/M2 | WEIGHT: 140 LBS | TEMPERATURE: 98.4 F | DIASTOLIC BLOOD PRESSURE: 84 MMHG

## 2018-01-19 DIAGNOSIS — M54.2 NECK PAIN: ICD-10-CM

## 2018-01-19 DIAGNOSIS — G89.29 CHRONIC LEFT-SIDED LOW BACK PAIN WITHOUT SCIATICA: ICD-10-CM

## 2018-01-19 DIAGNOSIS — M70.62 TROCHANTERIC BURSITIS OF LEFT HIP: ICD-10-CM

## 2018-01-19 DIAGNOSIS — M25.552 PAIN IN JOINT, PELVIC REGION AND THIGH, LEFT: ICD-10-CM

## 2018-01-19 DIAGNOSIS — F41.8 DEPRESSION WITH ANXIETY: ICD-10-CM

## 2018-01-19 DIAGNOSIS — M54.50 CHRONIC LEFT-SIDED LOW BACK PAIN WITHOUT SCIATICA: ICD-10-CM

## 2018-01-19 DIAGNOSIS — Z79.899 ENCOUNTER FOR LONG-TERM (CURRENT) USE OF HIGH-RISK MEDICATION: Primary | ICD-10-CM

## 2018-01-19 DIAGNOSIS — M51.37 DEGENERATION OF LUMBAR OR LUMBOSACRAL INTERVERTEBRAL DISC: ICD-10-CM

## 2018-01-19 DIAGNOSIS — M96.1 POSTLAMINECTOMY SYNDROME, LUMBAR REGION: ICD-10-CM

## 2018-01-19 DIAGNOSIS — F51.04 CHRONIC INSOMNIA: ICD-10-CM

## 2018-01-19 DIAGNOSIS — M50.30 DEGENERATION OF CERVICAL INTERVERTEBRAL DISC: ICD-10-CM

## 2018-01-19 DIAGNOSIS — K58.0 IRRITABLE BOWEL SYNDROME WITH DIARRHEA: ICD-10-CM

## 2018-01-19 DIAGNOSIS — G89.4 CHRONIC PAIN SYNDROME: ICD-10-CM

## 2018-01-19 LAB
ALCOHOL UR POC: NORMAL
AMPHETAMINES UR POC: NEGATIVE
BARBITURATES UR POC: NORMAL
BENZODIAZEPINES UR POC: NEGATIVE
BUPRENORPHINE UR POC: NEGATIVE
CANNABINOIDS UR POC: NEGATIVE
CARISOPRODOL UR POC: NORMAL
COCAINE UR POC: NEGATIVE
FENTANYL UR POC: NORMAL
MDMA/ECSTASY UR POC: NORMAL
METHADONE UR POC: NEGATIVE
METHAMPHETAMINE UR POC: NORMAL
METHYLPHENIDATE UR POC: NORMAL
OPIATES UR POC: NORMAL
OXYCODONE UR POC: NORMAL
PHENCYCLIDINE UR POC: NORMAL
PROPOXYPHENE UR POC: NORMAL
TRAMADOL UR POC: NORMAL
TRICYCLICS UR POC: NORMAL

## 2018-01-19 RX ORDER — OXYCODONE AND ACETAMINOPHEN 10; 325 MG/1; MG/1
1 TABLET ORAL
Qty: 120 TAB | Refills: 0 | Status: SHIPPED | OUTPATIENT
Start: 2018-04-05 | End: 2018-04-23 | Stop reason: SDUPTHER

## 2018-01-19 RX ORDER — OXYCODONE AND ACETAMINOPHEN 10; 325 MG/1; MG/1
1 TABLET ORAL
Qty: 120 TAB | Refills: 0 | Status: SHIPPED | OUTPATIENT
Start: 2018-03-06 | End: 2018-04-05

## 2018-01-19 RX ORDER — OXYCODONE AND ACETAMINOPHEN 10; 325 MG/1; MG/1
1 TABLET ORAL
Qty: 120 TAB | Refills: 0 | Status: SHIPPED | OUTPATIENT
Start: 2018-02-05 | End: 2018-03-07

## 2018-01-19 NOTE — PROGRESS NOTES
Tigist Heaton returns today for f/u of chronic severe pain involving the cervical lumbar spine as well as both hips. She suffers from widespread osteoarthritis as well as a post lumbar laminectomy pain syndrome and trochanteric bursitis. Ms. Aurelia Baker continues unchanged since last visit. She has been doing very well with her current treatment plan which has been offering significant pain control. She reports that she has been feeling ill recently. She says that she did not even want to come to her appointment and almost canceled her appointment today. I have asked her to follow-up with her PCP for symptoms worsen or fail to improve. She is otherwise doing well and happy with her current treatment plan. We will continue with her current treatment with no changes today. I will have her follow-up in 3 months or sooner if needed. Her current medication regimen consists of Percocet 10/325 QID PRN and OTC melatonin for sleep. Medications are helping with pain control and quality of life. Her pain is 3-5/10 with medication and 10/10 without. Pt describes pain as aching, sharp, and radiating. Aggravating factors include standing, sitting, walking, while alleviated by rest sitting, rest lying down, avoiding painful activities. Current treatment is helping to improve general activity, mood, walking, sleep, enjoyment of life    She  is otherwise doing well with no other complaints today. She denies any adverse events including nausea, vomiting, dizziness, increased constipation, hallucinations, or seizures. Because the patient's current regimen places him/her at increased risk for possible overdose, a prescription for naloxone nasal spray has been provided. The patient understands that this medication is only to be used in the setting of a possible overdose and that inadvertent use of this medication could precipitate overt withdrawal.    POC UDS today. Confirmation pending.        Allergies   Allergen Reactions    Morphine Unknown (comments)     Hallucinations  Pt. Denies allergy         Past Surgical History:   Procedure Laterality Date    HX APPENDECTOMY  1962    HX BACK SURGERY  1986    PHLD; L4-L5    HX GASTRIC BYPASS  2009    HX HYSTERECTOMY  1983    uterine cancer, stage 1    HX KNEE REPLACEMENT  2005    right    HX VISHAL AND BSO           Review of Systems   Constitutional: Negative for chills, fever and weight loss. Eyes: Negative for blurred vision and double vision. Respiratory: Negative for shortness of breath and wheezing. Cardiovascular: Negative for chest pain and palpitations. Gastrointestinal: Negative for constipation, diarrhea, heartburn, nausea and vomiting. Musculoskeletal: Positive for back pain, joint pain and neck pain. Skin: Negative for itching and rash. Neurological: Negative for dizziness and headaches. Psychiatric/Behavioral: Negative for depression. The patient is not nervous/anxious. Physical Exam   Constitutional: She is oriented to person, place, and time and well-developed, well-nourished, and in no distress. No distress. HENT:   Head: Normocephalic and atraumatic. Neck: Normal range of motion. Pulmonary/Chest: Effort normal.   Musculoskeletal: Normal range of motion. Neurological: She is alert and oriented to person, place, and time. She has normal reflexes. Gait normal.   Skin: Skin is warm and dry. She is not diaphoretic. No erythema. Psychiatric: Memory, affect and judgment normal.       ASSESSMENT:    Encounter Diagnoses   Name Primary?     Encounter for long-term (current) use of high-risk medication Yes    Chronic left-sided low back pain without sciatica     Irritable bowel syndrome with diarrhea     Postlaminectomy syndrome, lumbar region     Degeneration of lumbar or lumbosacral intervertebral disc     Pain in joint, pelvic region and thigh, left     Trochanteric bursitis of left hip     Neck pain     Degeneration of cervical intervertebral disc     Chronic pain syndrome     Chronic insomnia     Depression with anxiety         [place] Prescription Monitoring Program was reviewed which does not demonstrate aberrancies and/or inconsistencies with regard to the historical prescribing of controlled medications to this patient by other providers. PLAN / Pt Instructions:  1. Continue current plan with no evidence of addiction or diversion. Stable on current medication without adverse events. 2. Refill oxycodone 10/325 mg up to 4 times daily as needed for pain. 3. Continue OTC melatonin as needed. 4. Naloxone 4 mg nasal spray for opioid induced respiratory depression emergency only. 5. Discussed risks of addiction, dependency, and opioid induced hyperalgesia. 6. Return to clinic in 3 months        Orders Placed This Encounter    DRUG SCREEN    AMB POC DRUG SCREEN ()    oxyCODONE-acetaminophen (PERCOCET 10)  mg per tablet     Sig: Take 1 Tab by mouth four (4) times daily as needed for Pain for up to 30 days. Dispense:  120 Tab     Refill:  0    oxyCODONE-acetaminophen (PERCOCET 10)  mg per tablet     Sig: Take 1 Tab by mouth four (4) times daily as needed for Pain for up to 30 days. Dispense:  120 Tab     Refill:  0    oxyCODONE-acetaminophen (PERCOCET 10)  mg per tablet     Sig: Take 1 Tab by mouth four (4) times daily as needed for Pain for up to 20 doses. Max Daily Amount: 4 Tabs. Dispense:  120 Tab     Refill:  0       Pain medications prescribed with the objective of pain relief and improved physical and psychosocial function in this patient. Spent 25 minutes with patient today reviewing the treatment plan, goals of treatment plan, and limitations of the treatment plan, to include the potential for side effects from medications and procedures. Wendy Bartholomew 1/19/2018     Note: Please excuse any typographical errors.  Voice recognition software was used for this note and may cause mistakes.

## 2018-01-19 NOTE — PROGRESS NOTES
Nursing Notes    Patient presents to the office today in follow-up. Patient rates her pain at 8/10 on the numerical pain scale. Reviewed medications with counts as follows:    Rx Date filled Qty Dispensed Pill Count Last Dose Short   Percocet 10 mg 01/06/18 120 91 Last pm no         Comments: Patient is here today for a follow up appt today she states her pain level today is an 8      POC UDS was performed in office today per verbal order per [facility]    Any new labs or imaging since last appointment? NO    Have you been to an emergency room (ER) or urgent care clinic since your last visit? NO            Have you been hospitalized since your last visit? NO     If yes, where, when, and reason for visit? Have you seen or consulted any other health care providers outside of the [address] [place] [address]  since your last visit? NO     If yes, where, when, and reason for visit? HM deferred to pcp.

## 2018-01-19 NOTE — MR AVS SNAPSHOT
[address] [address] 
[phone] Patient: Anish Jacobson MRN: [id number] GFD:[id number] Visit Information Date & Time Provider Department Dept. Phone Encounter #  
 1/19/2018  9:00 AM Arnold Arcos, Robyn Sw [facility] [phone] [id number] Follow-up Instructions Return in about 3 months (around 4/19/2018). Upcoming Health Maintenance Date Due Hepatitis C Screening 1950 DTaP/Tdap/Td series (1 - Tdap) 5/28/1969 ZOSTER VACCINE AGE 60> 3/28/2008 GLAUCOMA SCREENING Q2Y 5/28/2013 Pneumococcal 65+ Low/Medium Risk (2 of 2 - PPSV23) 9/12/2017 MEDICARE YEARLY EXAM 9/13/2017 COLONOSCOPY 4/26/2019 BREAST CANCER SCRN MAMMOGRAM 6/1/2019 Allergies as of 1/19/2018  Review Complete On: 1/19/2018 By: ELI Dowd Severity Noted Reaction Type Reactions Morphine    Unknown (comments) Hallucinations Pt. Denies allergy Current Immunizations  Reviewed on 1/3/2018 Name Date Influenza High Dose Vaccine PF 1/3/2018, 9/12/2016 Pneumococcal Conjugate (PCV-13) 9/12/2016 Not reviewed this visit You Were Diagnosed With   
  
 Codes Comments Encounter for long-term (current) use of high-risk medication    -  Primary ICD-10-CM: J46.775 ICD-9-CM: V58.69 Chronic left-sided low back pain without sciatica     ICD-10-CM: M54.5, G89.29 ICD-9-CM: 724.2, 338.29 Irritable bowel syndrome with diarrhea     ICD-10-CM: K58.0 ICD-9-CM: 810.2 Postlaminectomy syndrome, lumbar region     ICD-10-CM: M96.1 ICD-9-CM: 722.83 Degeneration of lumbar or lumbosacral intervertebral disc     ICD-10-CM: M51.37 
ICD-9-CM: 722.52 Pain in joint, pelvic region and thigh, left     ICD-10-CM: M25.552 ICD-9-CM: 719.45 Trochanteric bursitis of left hip     ICD-10-CM: M70.62 ICD-9-CM: 726.5 Neck pain     ICD-10-CM: M54.2 ICD-9-CM: 723.1 Degeneration of cervical intervertebral disc     ICD-10-CM: M50.30 ICD-9-CM: 722.4 Chronic pain syndrome     ICD-10-CM: G89.4 ICD-9-CM: 338. 4 Chronic insomnia     ICD-10-CM: F51.04 
ICD-9-CM: 780.52 Depression with anxiety     ICD-10-CM: F41.8 ICD-9-CM: 300.4 Vitals BP Pulse Temp Resp Weight(growth percentile) BMI  
 170/84 80 98.4 °F (36.9 °C) 16 140 lb (63.5 kg) 27.34 kg/m2 OB Status Smoking Status Hysterectomy Current Every Day Smoker BMI and BSA Data Body Mass Index Body Surface Area  
 27.34 kg/m 2 1.64 m 2 Preferred Pharmacy Pharmacy Name Phone [facility] [address] [place] [address] [phone] Your Updated Medication List  
  
   
This list is accurate as of: 1/19/18  9:17 AM.  Always use your most recent med list. amLODIPine 5 mg tablet Commonly known as:  Ingramhilda Daleer TAKE 1 TABLET BY MOUTH DAILY  
  
 ARIPiprazole 10 mg tablet Commonly known as:  ABILIFY Take 1/2 to 1 tablet by mouth daily as directed  
  
 azelastine 137 mcg (0.1 %) nasal spray Commonly known as:  ASTELIN  
1 Spray by Both Nostrils route two (2) times a day. Use in each nostril as directed  
  
 carvedilol 12.5 mg tablet Commonly known as:  COREG  
TAKE 1 TABLET BY MOUTH TWICE DAILY WITH MEALS  
  
 cloNIDine HCl 0.1 mg tablet Commonly known as:  CATAPRES  
TAKE 1 TABLET BY MOUTH TWICE DAILY  
  
 colestipol 1 gram tablet Commonly known as:  COLESTID Take 1 g by mouth two (2) times a day. cyanocobalamin 1,000 mcg tablet Take 5,000 mcg by mouth daily. dicyclomine 10 mg capsule Commonly known as:  BENTYL Take 10 mg by mouth 4 times daily (before meals and nightly). folic acid 810 mcg tablet Take 400 mcg by mouth daily. l-methylfolate 15 mg tablet Commonly known as:  Janene [place] Take 15 mg by mouth daily. LORazepam 0.5 mg tablet Commonly known as:  ATIVAN Take 0.5 Tabs by mouth two (2) times daily as needed. mirtazapine 15 mg tablet Commonly known as:  REMERON  
TAKE 1 TABLET BY MOUTH EVERY NIGHT MULTIVITAMIN PO Take  by mouth.  
  
 naloxone 4 mg/actuation nasal spray Commonly known as:  NARCAN  
4 mg by Nasal route as needed for up to 2 doses. Indications: OPIOID TOXICITY  
  
 * oxyCODONE-acetaminophen  mg per tablet Commonly known as:  PERCOCET 10 Take 1 Tab by mouth four (4) times daily as needed for Pain for up to 30 days. Start taking on:  2/5/2018 * oxyCODONE-acetaminophen  mg per tablet Commonly known as:  PERCOCET 10 Take 1 Tab by mouth four (4) times daily as needed for Pain for up to 30 days. Start taking on:  3/6/2018 * oxyCODONE-acetaminophen  mg per tablet Commonly known as:  PERCOCET 10 Take 1 Tab by mouth four (4) times daily as needed for Pain for up to 20 doses. Max Daily Amount: 4 Tabs. Start taking on:  4/5/2018 potassium 99 mg tablet Take 99 mg by mouth daily. risedronate 35 mg tablet Commonly known as:  ACTONEL Take 1 tablet each week ZANTAC 150 mg tablet Generic drug:  raNITIdine Take 150 mg by mouth two (2) times a day. ZOLOFT 100 mg tablet Generic drug:  sertraline Take 200 mg by mouth daily. * Notice: This list has 3 medication(s) that are the same as other medications prescribed for you. Read the directions carefully, and ask your doctor or other care provider to review them with you. Prescriptions Printed Refills  
 oxyCODONE-acetaminophen (PERCOCET 10)  mg per tablet 0 Starting on: 2/5/2018 Sig: Take 1 Tab by mouth four (4) times daily as needed for Pain for up to 30 days. Class: Print Route: Oral  
 oxyCODONE-acetaminophen (PERCOCET 10)  mg per tablet 0 Starting on: 3/6/2018 Sig: Take 1 Tab by mouth four (4) times daily as needed for Pain for up to 30 days. Class: Print Route: Oral  
 oxyCODONE-acetaminophen (PERCOCET 10)  mg per tablet 0 Starting on: 4/5/2018 Sig: Take 1 Tab by mouth four (4) times daily as needed for Pain for up to 20 doses. Max Daily Amount: 4 Tabs. Class: Print Route: Oral  
  
We Performed the Following AMB POC DRUG SCREEN () [ [facility]] DRUG SCREEN [[id number] Custom] Follow-up Instructions Return in about 3 months (around 4/19/2018). Patient Instructions 1. Continue current plan with no evidence of addiction or diversion. Stable on current medication without adverse events. 2. Refill oxycodone 10/325 mg up to 4 times daily as needed for pain. 3. Continue OTC melatonin as needed. 4. Naloxone 4 mg nasal spray for opioid induced respiratory depression emergency only. 5. Discussed risks of addiction, dependency, and opioid induced hyperalgesia. 6. Return to clinic in 3 months Please provide this summary of care documentation to your next provider. Your primary care clinician is listed as Ayah Profit. If you have any questions after today's visit, please call [phone].

## 2018-02-21 RX ORDER — CARVEDILOL 12.5 MG/1
TABLET ORAL
Qty: 180 TAB | Refills: 0 | Status: SHIPPED | OUTPATIENT
Start: 2018-02-21 | End: 2018-05-23 | Stop reason: SDUPTHER

## 2018-03-30 RX ORDER — AMLODIPINE BESYLATE 5 MG/1
TABLET ORAL
Qty: 90 TAB | Refills: 0 | Status: SHIPPED | OUTPATIENT
Start: 2018-03-30 | End: 2019-02-01 | Stop reason: SDUPTHER

## 2018-04-17 RX ORDER — AMLODIPINE BESYLATE 5 MG/1
TABLET ORAL
Qty: 90 TAB | Refills: 0 | Status: SHIPPED | OUTPATIENT
Start: 2018-04-17 | End: 2018-04-23 | Stop reason: SDUPTHER

## 2018-04-23 ENCOUNTER — OFFICE VISIT (OUTPATIENT)
Dept: PAIN MANAGEMENT | Age: 68
End: 2018-04-23

## 2018-04-23 VITALS
HEIGHT: 60 IN | BODY MASS INDEX: 27.48 KG/M2 | HEART RATE: 63 BPM | SYSTOLIC BLOOD PRESSURE: 149 MMHG | TEMPERATURE: 98.3 F | RESPIRATION RATE: 14 BRPM | DIASTOLIC BLOOD PRESSURE: 81 MMHG | WEIGHT: 140 LBS

## 2018-04-23 DIAGNOSIS — M25.552 PAIN IN JOINT, PELVIC REGION AND THIGH, LEFT: ICD-10-CM

## 2018-04-23 DIAGNOSIS — G89.4 CHRONIC PAIN SYNDROME: ICD-10-CM

## 2018-04-23 DIAGNOSIS — M51.37 DEGENERATION OF LUMBAR OR LUMBOSACRAL INTERVERTEBRAL DISC: ICD-10-CM

## 2018-04-23 DIAGNOSIS — F51.04 CHRONIC INSOMNIA: ICD-10-CM

## 2018-04-23 DIAGNOSIS — M54.50 CHRONIC LEFT-SIDED LOW BACK PAIN WITHOUT SCIATICA: ICD-10-CM

## 2018-04-23 DIAGNOSIS — M50.30 DEGENERATION OF CERVICAL INTERVERTEBRAL DISC: ICD-10-CM

## 2018-04-23 DIAGNOSIS — F41.8 DEPRESSION WITH ANXIETY: ICD-10-CM

## 2018-04-23 DIAGNOSIS — Z79.899 ENCOUNTER FOR LONG-TERM (CURRENT) USE OF HIGH-RISK MEDICATION: ICD-10-CM

## 2018-04-23 DIAGNOSIS — K58.0 IRRITABLE BOWEL SYNDROME WITH DIARRHEA: ICD-10-CM

## 2018-04-23 DIAGNOSIS — M54.2 NECK PAIN: ICD-10-CM

## 2018-04-23 DIAGNOSIS — G89.29 CHRONIC LEFT-SIDED LOW BACK PAIN WITHOUT SCIATICA: ICD-10-CM

## 2018-04-23 DIAGNOSIS — M70.62 TROCHANTERIC BURSITIS OF LEFT HIP: ICD-10-CM

## 2018-04-23 DIAGNOSIS — M96.1 POSTLAMINECTOMY SYNDROME, LUMBAR REGION: ICD-10-CM

## 2018-04-23 RX ORDER — MULTIVITAMIN
1 TABLET ORAL DAILY
COMMUNITY
End: 2019-01-21

## 2018-04-23 RX ORDER — OXYCODONE AND ACETAMINOPHEN 10; 325 MG/1; MG/1
1 TABLET ORAL
Qty: 120 TAB | Refills: 0 | Status: SHIPPED | OUTPATIENT
Start: 2018-05-11 | End: 2018-07-06 | Stop reason: SDUPTHER

## 2018-04-23 NOTE — PATIENT INSTRUCTIONS
1. Continue current plan with no evidence of addiction or diversion. Stable on current medication without adverse events. 2. Refill oxycodone 10/325 mg up to 4 times daily as needed for pain. 3. Continue OTC melatonin as needed. 4. Please discuss lorazepam with your psychiatrist as soon as possible as we will no longer be able to write opioid medications while concurrently using benzodiazepines. 5. Naloxone 4 mg nasal spray for opioid induced respiratory depression emergency only. 6. Please remember to call at least 34 days prior to your medication refill. 7. Discussed risks of addiction, dependency, and opioid induced hyperalgesia. 8. Return to clinic in 3 months             Learning About Benefits From Quitting Smoking  How does quitting smoking make you healthier? If you're thinking about quitting smoking, you may have a few reasons to be smoke-free. Your health may be one of them. · When you quit smoking, you lower your risks for cancer, lung disease, heart attack, stroke, blood vessel disease, and blindness from macular degeneration. · When you're smoke-free, you get sick less often, and you heal faster. You are less likely to get colds, flu, bronchitis, and pneumonia. · As a nonsmoker, you may find that your mood is better and you are less stressed. When and how will you feel healthier? Quitting has real health benefits that start from day 1 of being smoke-free. And the longer you stay smoke-free, the healthier you get and the better you feel. The first hours  · After just 20 minutes, your blood pressure and heart rate go down. That means there's less stress on your heart and blood vessels. · Within 12 hours, the level of carbon monoxide in your blood drops back to normal. That makes room for more oxygen. With more oxygen in your body, you may notice that you have more energy than when you smoked. After 2 weeks  · Your lungs start to work better.   · Your risk of heart attack starts to drop.  After 1 month  · When your lungs are clear, you cough less and breathe deeper, so it's easier to be active. · Your sense of taste and smell return. That means you can enjoy food more than you have since you started smoking. Over the years  · After 1 year, your risk of heart disease is half what it would be if you kept smoking. · After 5 years, your risk of stroke starts to shrink. Within a few years after that, it's about the same as if you'd never smoked. · After 10 years, your risk of dying from lung cancer is cut by about half. And your risk for many other types of cancer is lower too. How would quitting help others in your life? When you quit smoking, you improve the health of everyone who now breathes in your smoke. · Their heart, lung, and cancer risks drop, much like yours. · They are sick less. For babies and small children, living smoke-free means they're less likely to have ear infections, pneumonia, and bronchitis. · If you're a woman who is or will be pregnant someday, quitting smoking means a healthier . · Children who are close to you are less likely to become adult smokers. Where can you learn more? Go to http://regan-caitlin.info/. Enter 052 [id number] 72 11 in the search box to learn more about \"Learning About Benefits From Quitting Smoking. \"  Current as of: 2017  Content Version: 11.4  © 1277-5902 [employer]. Care instructions adapted under license by [employer] (which disclaims liability or warranty for this information). If you have questions about a medical condition or this instruction, always ask your healthcare professional. [place] [address] any warranty or liability for your use of this information.

## 2018-04-23 NOTE — PROGRESS NOTES
Jhonatan Deluna returns today for f/u of chronic severe pain involving the cervical lumbar spine as well as both hips. She suffers from widespread osteoarthritis as well as a post lumbar laminectomy pain syndrome and trochanteric bursitis. Lumabar laminectomy L4-5 1986. Prior lumbar epidural injections, no improvement from her last injection. TBI's were not helpful. Ms. Graves Economy continues unchanged since last visit. She has been doing very well with her current treatment plan which has been offering significant pain control. We did have a lengthy conversation about the departure of her previous providers today. I explained to her that moving forward we will be concentrating on more of a conservative and on opioid treatment plan. She is in agreement with this idea. I also explained to her that we were no longer be able to prescribe opiate medications while she is concurrently using benzodiazepines. This includes lorazepam which is currently prescribed by her psychiatrist.  I have asked her to discuss this medication and other alternative treatments with her psychiatrist as soon as possible. She verbally agrees. She is otherwise doing well with no new complaints today. She is quite concerned about having to  her prescriptions each month from the office. She is planning to have a family member picked them up because of her schedule with work. I will have her follow-up in 3 months for further evaluation and recommendation or sooner if needed. Her current medication regimen consists of Percocet 10/325 QID PRN and OTC melatonin for sleep. Zoloft, Abilify, and Deplin by her psychiatrist. She is also prescribed Lorazepam which she uses very sparingly. Medications are helping with pain control and quality of life. Her pain is 3-5/10 with medication and 10/10 without. Pt describes pain as aching, sharp, and radiating.  Aggravating factors include standing, sitting, walking, while alleviated by rest sitting, rest lying down, avoiding painful activities. Current treatment is helping to improve general activity, mood, walking, sleep, enjoyment of life    She  is otherwise doing well with no other complaints today. She denies any adverse events including nausea, vomiting, dizziness, increased constipation, hallucinations, or seizures. Because the patient's current regimen places him/her at increased risk for possible overdose, a prescription for naloxone nasal spray has been provided. The patient understands that this medication is only to be used in the setting of a possible overdose and that inadvertent use of this medication could precipitate overt withdrawal.           Allergies   Allergen Reactions    Morphine Unknown (comments)     Hallucinations  Pt. Denies allergy         Past Surgical History:   Procedure Laterality Date    HX APPENDECTOMY  1962    HX BACK SURGERY  1986    PHLD; L4-L5    HX GASTRIC BYPASS  2009    HX HYSTERECTOMY  1983    uterine cancer, stage 1    HX KNEE REPLACEMENT  2005    right    HX VISHAL AND BSO           Review of Systems   Constitutional: Negative for chills, fever and weight loss. Eyes: Negative for blurred vision and double vision. Respiratory: Negative for shortness of breath and wheezing. Cardiovascular: Negative for chest pain and palpitations. Gastrointestinal: Negative for constipation, diarrhea, heartburn, nausea and vomiting. Musculoskeletal: Positive for back pain, joint pain and neck pain. Skin: Negative for itching and rash. Neurological: Negative for dizziness and headaches. Psychiatric/Behavioral: Negative for depression. The patient is not nervous/anxious. Physical Exam   Constitutional: She is oriented to person, place, and time and well-developed, well-nourished, and in no distress. No distress. HENT:   Head: Normocephalic and atraumatic. Neck: Normal range of motion.    Pulmonary/Chest: Effort normal.   Musculoskeletal: Normal range of motion. Neurological: She is alert and oriented to person, place, and time. She has normal reflexes. Gait normal.   Skin: Skin is warm and dry. She is not diaphoretic. No erythema. Psychiatric: Memory, affect and judgment normal.       ASSESSMENT:    Encounter Diagnoses   Name Primary?  Chronic left-sided low back pain without sciatica     Irritable bowel syndrome with diarrhea     Postlaminectomy syndrome, lumbar region     Degeneration of lumbar or lumbosacral intervertebral disc     Pain in joint, pelvic region and thigh, left     Trochanteric bursitis of left hip     Neck pain     Degeneration of cervical intervertebral disc     Encounter for long-term (current) use of high-risk medication     Chronic pain syndrome     Chronic insomnia     Depression with anxiety      COMM: [id number] [place] Program was reviewed which does not demonstrate aberrancies and/or inconsistencies with regard to the historical prescribing of controlled medications to this patient by other providers. PLAN / Pt Instructions:  1. Continue current plan with no evidence of addiction or diversion. Stable on current medication without adverse events. 2. Refill oxycodone 10/325 mg up to 4 times daily as needed for pain. 3. Continue OTC melatonin as needed. 4. Please discuss lorazepam with your psychiatrist as soon as possible as we will no longer be able to write opioid medications while concurrently using benzodiazepines. 5. Naloxone 4 mg nasal spray for opioid induced respiratory depression emergency only. 6. Please remember to call at least 34 days prior to your medication refill. 7. Discussed risks of addiction, dependency, and opioid induced hyperalgesia.   8. Return to clinic in 3 months          Medications Ordered Today   Medications    oxyCODONE-acetaminophen (PERCOCET 10)  mg per tablet     Sig: Take 1 Tab by mouth four (4) times daily as needed for Pain for up to 20 doses. Max Daily Amount: 4 Tabs. Dispense:  120 Tab     Refill:  0         Pain medications prescribed with the objective of pain relief and improved physical and psychosocial function in this patient. Spent 25 minutes with patient today reviewing the treatment plan, goals of treatment plan, and limitations of the treatment plan, to include the potential for side effects from medications and procedures. Wendy Silva 4/23/2018     Note: Please excuse any typographical errors. Voice recognition software was used for this note and may cause mistakes.

## 2018-04-23 NOTE — MR AVS SNAPSHOT
[address] [address] 
[phone] Patient: Desmond Anders MRN: [id number] IAJ:[id number] Visit Information Date & Time Provider Department Dept. Phone Encounter #  
 4/23/2018  8:00 AM Alberto Earl, [address] [place] [address] for Pain Management [id number] Follow-up Instructions Return in about 3 months (around 7/23/2018). Upcoming Health Maintenance Date Due Hepatitis C Screening 1950 DTaP/Tdap/Td series (1 - Tdap) 5/28/1969 ZOSTER VACCINE AGE 60> 3/28/2008 GLAUCOMA SCREENING Q2Y 5/28/2013 Pneumococcal 65+ Low/Medium Risk (2 of 2 - PPSV23) 9/12/2017 MEDICARE YEARLY EXAM 3/14/2018 COLONOSCOPY 4/26/2019 BREAST CANCER SCRN MAMMOGRAM 6/1/2019 Allergies as of 4/23/2018  Review Complete On: 4/23/2018 By: ELI Stovall Severity Noted Reaction Type Reactions Morphine    Unknown (comments) Hallucinations Pt. Denies allergy Current Immunizations  Reviewed on 1/3/2018 Name Date Influenza High Dose Vaccine PF 1/3/2018, 9/12/2016 Pneumococcal Conjugate (PCV-13) 9/12/2016 Not reviewed this visit You Were Diagnosed With   
  
 Codes Comments Chronic left-sided low back pain without sciatica     ICD-10-CM: M54.5, G89.29 ICD-9-CM: 724.2, 338.29 Irritable bowel syndrome with diarrhea     ICD-10-CM: K58.0 ICD-9-CM: 844.8 Postlaminectomy syndrome, lumbar region     ICD-10-CM: M96.1 ICD-9-CM: 722.83 Degeneration of lumbar or lumbosacral intervertebral disc     ICD-10-CM: M51.37 
ICD-9-CM: 722.52 Pain in joint, pelvic region and thigh, left     ICD-10-CM: M25.552 ICD-9-CM: 719.45 Trochanteric bursitis of left hip     ICD-10-CM: M70.62 ICD-9-CM: 726.5 Neck pain     ICD-10-CM: M54.2 ICD-9-CM: 723.1 Degeneration of cervical intervertebral disc     ICD-10-CM: M50.30 ICD-9-CM: 722.4 Encounter for long-term (current) use of high-risk medication     ICD-10-CM: Z79.899 ICD-9-CM: V58.69 Chronic pain syndrome     ICD-10-CM: G89.4 ICD-9-CM: 338. 4 Chronic insomnia     ICD-10-CM: F51.04 
ICD-9-CM: 780.52 Depression with anxiety     ICD-10-CM: F41.8 ICD-9-CM: 300.4 Vitals BP Pulse Temp Resp Height(growth percentile) Weight(growth percentile) 149/81 (BP 1 Location: Left arm, BP Patient Position: Sitting) 63 98.3 °F (36.8 °C) (Oral) 14 5' (1.524 m) 140 lb (63.5 kg) BMI OB Status Smoking Status 27.34 kg/m2 Hysterectomy Current Every Day Smoker Vitals History BMI and BSA Data Body Mass Index Body Surface Area  
 27.34 kg/m 2 1.64 m 2 Preferred Pharmacy Pharmacy Name Phone [facility] [address] [place] [phone] Your Updated Medication List  
  
   
This list is accurate as of 4/23/18  8:35 AM.  Always use your most recent med list. amLODIPine 5 mg tablet Commonly known as:  Saad Whitney TAKE 1 TABLET BY MOUTH DAILY  
  
 ARIPiprazole 10 mg tablet Commonly known as:  ABILIFY Take 1/2 to 1 tablet by mouth daily as directed  
  
 azelastine 137 mcg (0.1 %) nasal spray Commonly known as:  ASTELIN  
1 Spray by Both Nostrils route two (2) times a day. Use in each nostril as directed  
  
 calcium-cholecalciferol (D3) tablet Commonly known as:  CALTRATE 600+D Take 1 Tab by mouth daily. carvedilol 12.5 mg tablet Commonly known as:  COREG  
TAKE 1 TABLET BY MOUTH TWICE DAILY WITH MEALS  
  
 cloNIDine HCl 0.1 mg tablet Commonly known as:  CATAPRES  
TAKE 1 TABLET BY MOUTH TWICE DAILY  
  
 colestipol 1 gram tablet Commonly known as:  COLESTID Take 1 g by mouth two (2) times a day. cyanocobalamin 1,000 mcg tablet Take 5,000 mcg by mouth daily. dicyclomine 10 mg capsule Commonly known as:  BENTYL Take 10 mg by mouth 4 times daily (before meals and nightly). folic acid 933 mcg tablet Take 400 mcg by mouth daily. l-methylfolate 15 mg tablet Commonly known as:  Mai Hanly Take 15 mg by mouth daily. LORazepam 0.5 mg tablet Commonly known as:  ATIVAN Take 0.5 Tabs by mouth two (2) times daily as needed. MULTIVITAMIN PO Take 1 Tab by mouth daily. naloxone 4 mg/actuation nasal spray Commonly known as:  NARCAN  
4 mg by Nasal route as needed for up to 2 doses. Indications: OPIOID TOXICITY  
  
 oxyCODONE-acetaminophen  mg per tablet Commonly known as:  PERCOCET 10 Take 1 Tab by mouth four (4) times daily as needed for Pain for up to 20 doses. Max Daily Amount: 4 Tabs. Start taking on:  5/11/2018 potassium 99 mg tablet Take 99 mg by mouth daily. risedronate 35 mg tablet Commonly known as:  ACTONEL Take 1 tablet each week ZANTAC 150 mg tablet Generic drug:  raNITIdine Take 150 mg by mouth two (2) times a day. ZOLOFT 100 mg tablet Generic drug:  sertraline Take 200 mg by mouth daily. Prescriptions Printed Refills  
 oxyCODONE-acetaminophen (PERCOCET 10)  mg per tablet 0 Starting on: 5/11/2018 Sig: Take 1 Tab by mouth four (4) times daily as needed for Pain for up to 20 doses. Max Daily Amount: 4 Tabs. Class: Print Route: Oral  
  
Follow-up Instructions Return in about 3 months (around 7/23/2018). Patient Instructions 1. Continue current plan with no evidence of addiction or diversion. Stable on current medication without adverse events. 2. Refill oxycodone 10/325 mg up to 4 times daily as needed for pain. 3. Continue OTC melatonin as needed. 4. Please discuss lorazepam with your psychiatrist as soon as possible as we will no longer be able to write opioid medications while concurrently using benzodiazepines. 5. Naloxone 4 mg nasal spray for opioid induced respiratory depression emergency only. 6. Please remember to call at least 34 days prior to your medication refill. 7. Discussed risks of addiction, dependency, and opioid induced hyperalgesia. 8. Return to clinic in 3 months Learning About Benefits From Quitting Smoking How does quitting smoking make you healthier? If you're thinking about quitting smoking, you may have a few reasons to be smoke-free. Your health may be one of them. · When you quit smoking, you lower your risks for cancer, lung disease, heart attack, stroke, blood vessel disease, and blindness from macular degeneration. · When you're smoke-free, you get sick less often, and you heal faster. You are less likely to get colds, flu, bronchitis, and pneumonia. · As a nonsmoker, you may find that your mood is better and you are less stressed. When and how will you feel healthier? Quitting has real health benefits that start from day 1 of being smoke-free. And the longer you stay smoke-free, the healthier you get and the better you feel. The first hours · After just 20 minutes, your blood pressure and heart rate go down. That means there's less stress on your heart and blood vessels. · Within 12 hours, the level of carbon monoxide in your blood drops back to normal. That makes room for more oxygen. With more oxygen in your body, you may notice that you have more energy than when you smoked. After 2 weeks · Your lungs start to work better. · Your risk of heart attack starts to drop. After 1 month · When your lungs are clear, you cough less and breathe deeper, so it's easier to be active. · Your sense of taste and smell return. That means you can enjoy food more than you have since you started smoking. Over the years · After 1 year, your risk of heart disease is half what it would be if you kept smoking. · After 5 years, your risk of stroke starts to shrink. Within a few years after that, it's about the same as if you'd never smoked. · After 10 years, your risk of dying from lung cancer is cut by about half. And your risk for many other types of cancer is lower too. How would quitting help others in your life? When you quit smoking, you improve the health of everyone who now breathes in your smoke. · Their heart, lung, and cancer risks drop, much like yours. · They are sick less. For babies and small children, living smoke-free means they're less likely to have ear infections, pneumonia, and bronchitis. · If you're a woman who is or will be pregnant someday, quitting smoking means a healthier . · Children who are close to you are less likely to become adult smokers. Where can you learn more? Go to http://regan-caitlin.info/. Enter 052 [id number] [id number] 11 in the search box to learn more about \"Learning About Benefits From Quitting Smoking. \" Current as of: 2017 Content Version: 11.4 © 6038-7711 [employer]. Care instructions adapted under license by [employer] (which disclaims liability or warranty for this information). If you have questions about a medical condition or this instruction, always ask your healthcare professional. [place] [address] any warranty or liability for your use of this information. Introducing [facility]! Dear Clara Spain: Thank you for requesting a Localist account. Our records indicate that you have previously registered for a Localist account but its currently inactive. Please call our Localist support line at [phone]. Additional Information If you have questions, please visit the Frequently Asked Questions section of the Localist website at [email or web address] Remember, Localist is NOT to be used for urgent needs. For medical emergencies, dial 911. Now available from your iPhone and Android! Please provide this summary of care documentation to your next provider. Your primary care clinician is listed as Rohan Guzman. If you have any questions after today's visit, please call [phone].

## 2018-04-23 NOTE — PROGRESS NOTES
Nursing Notes    Patient presents to the office today in follow-up. Patient rates her pain at 5/10 on the numerical pain scale. Reviewed medications with counts as follows:    Rx Date filled Qty Dispensed Pill Count Last Dose Short   Percocet 10/325 mg  04/12/18 120 95 1/2 today no                                     POC UDS was not performed in office today    Any new labs or imaging since last appointment? NO    Have you been to an emergency room (ER) or urgent care clinic since your last visit? NO            Have you been hospitalized since your last visit? NO     If yes, where, when, and reason for visit? Have you seen or consulted any other health care providers outside of the [facility]  since your last visit? NO     If yes, where, when, and reason for visit? HM deferred to pcp.

## 2018-04-26 DIAGNOSIS — I10 BENIGN ESSENTIAL HYPERTENSION: ICD-10-CM

## 2018-04-26 RX ORDER — CLONIDINE HYDROCHLORIDE 0.1 MG/1
TABLET ORAL
Qty: 180 TAB | Refills: 0 | Status: SHIPPED | OUTPATIENT
Start: 2018-04-26 | End: 2018-08-29 | Stop reason: SDUPTHER

## 2018-05-23 RX ORDER — CARVEDILOL 12.5 MG/1
TABLET ORAL
Qty: 180 TAB | Refills: 0 | Status: SHIPPED | OUTPATIENT
Start: 2018-05-23 | End: 2018-09-21 | Stop reason: SDUPTHER

## 2018-07-06 DIAGNOSIS — F41.8 DEPRESSION WITH ANXIETY: ICD-10-CM

## 2018-07-06 DIAGNOSIS — M51.37 DEGENERATION OF LUMBAR OR LUMBOSACRAL INTERVERTEBRAL DISC: ICD-10-CM

## 2018-07-06 DIAGNOSIS — M25.552 PAIN IN JOINT, PELVIC REGION AND THIGH, LEFT: ICD-10-CM

## 2018-07-06 DIAGNOSIS — M96.1 POSTLAMINECTOMY SYNDROME, LUMBAR REGION: ICD-10-CM

## 2018-07-06 DIAGNOSIS — F51.04 CHRONIC INSOMNIA: ICD-10-CM

## 2018-07-06 DIAGNOSIS — M50.30 DEGENERATION OF CERVICAL INTERVERTEBRAL DISC: ICD-10-CM

## 2018-07-06 DIAGNOSIS — M70.62 TROCHANTERIC BURSITIS OF LEFT HIP: ICD-10-CM

## 2018-07-06 DIAGNOSIS — G89.4 CHRONIC PAIN SYNDROME: ICD-10-CM

## 2018-07-06 DIAGNOSIS — Z79.899 ENCOUNTER FOR LONG-TERM (CURRENT) USE OF HIGH-RISK MEDICATION: ICD-10-CM

## 2018-07-06 DIAGNOSIS — M54.50 CHRONIC LEFT-SIDED LOW BACK PAIN WITHOUT SCIATICA: ICD-10-CM

## 2018-07-06 DIAGNOSIS — M54.2 NECK PAIN: ICD-10-CM

## 2018-07-06 DIAGNOSIS — G89.29 CHRONIC LEFT-SIDED LOW BACK PAIN WITHOUT SCIATICA: ICD-10-CM

## 2018-07-06 DIAGNOSIS — K58.0 IRRITABLE BOWEL SYNDROME WITH DIARRHEA: ICD-10-CM

## 2018-07-06 NOTE — TELEPHONE ENCOUNTER
Shari Carter has called requesting a refill of their controlled medication Percocet 10 mg  for the management of chronic left-sided low back pain without sciatica     Last office visit date: 04/23/18    Date last  was pulled and reviewed : 07/06/18    Was the patient compliant when the above report was pulled? yes    Analgesia: The patient states that she gets 85% pain relief from the medication. Aberrancies: The are no aberrancies at this time to be noted    ADL's: The patient states that she is able to do adls while on the medication. Adverse Reaction: No adverse reactions noted     Provider's last note and plan of care reviewed? yes  Request forwarded to provider for review.

## 2018-07-09 RX ORDER — OXYCODONE AND ACETAMINOPHEN 10; 325 MG/1; MG/1
1 TABLET ORAL
Qty: 120 TAB | Refills: 0 | Status: SHIPPED | OUTPATIENT
Start: 2018-07-11 | End: 2018-07-30 | Stop reason: SDUPTHER

## 2018-07-10 ENCOUNTER — TELEPHONE (OUTPATIENT)
Dept: PAIN MANAGEMENT | Age: 68
End: 2018-07-10

## 2018-07-22 RX ORDER — AMLODIPINE BESYLATE 5 MG/1
TABLET ORAL
Qty: 90 TAB | Refills: 0 | Status: SHIPPED | OUTPATIENT
Start: 2018-07-22 | End: 2018-07-30 | Stop reason: SDUPTHER

## 2018-07-30 ENCOUNTER — OFFICE VISIT (OUTPATIENT)
Dept: PAIN MANAGEMENT | Age: 68
End: 2018-07-30

## 2018-07-30 VITALS
DIASTOLIC BLOOD PRESSURE: 71 MMHG | WEIGHT: 140 LBS | BODY MASS INDEX: 27.48 KG/M2 | TEMPERATURE: 97.1 F | HEIGHT: 60 IN | HEART RATE: 69 BPM | RESPIRATION RATE: 14 BRPM | SYSTOLIC BLOOD PRESSURE: 139 MMHG

## 2018-07-30 DIAGNOSIS — M70.62 TROCHANTERIC BURSITIS OF LEFT HIP: ICD-10-CM

## 2018-07-30 DIAGNOSIS — G89.29 CHRONIC LEFT-SIDED LOW BACK PAIN WITHOUT SCIATICA: ICD-10-CM

## 2018-07-30 DIAGNOSIS — M51.37 DEGENERATION OF LUMBAR OR LUMBOSACRAL INTERVERTEBRAL DISC: ICD-10-CM

## 2018-07-30 DIAGNOSIS — G89.4 CHRONIC PAIN SYNDROME: ICD-10-CM

## 2018-07-30 DIAGNOSIS — F41.8 DEPRESSION WITH ANXIETY: ICD-10-CM

## 2018-07-30 DIAGNOSIS — F51.04 CHRONIC INSOMNIA: ICD-10-CM

## 2018-07-30 DIAGNOSIS — M54.2 NECK PAIN: ICD-10-CM

## 2018-07-30 DIAGNOSIS — K58.0 IRRITABLE BOWEL SYNDROME WITH DIARRHEA: ICD-10-CM

## 2018-07-30 DIAGNOSIS — Z79.899 ENCOUNTER FOR LONG-TERM (CURRENT) USE OF HIGH-RISK MEDICATION: Primary | ICD-10-CM

## 2018-07-30 DIAGNOSIS — M50.30 DEGENERATION OF CERVICAL INTERVERTEBRAL DISC: ICD-10-CM

## 2018-07-30 DIAGNOSIS — M96.1 POSTLAMINECTOMY SYNDROME, LUMBAR REGION: ICD-10-CM

## 2018-07-30 DIAGNOSIS — M54.50 CHRONIC LEFT-SIDED LOW BACK PAIN WITHOUT SCIATICA: ICD-10-CM

## 2018-07-30 DIAGNOSIS — M25.552 PAIN IN JOINT, PELVIC REGION AND THIGH, LEFT: ICD-10-CM

## 2018-07-30 RX ORDER — OXYCODONE AND ACETAMINOPHEN 10; 325 MG/1; MG/1
1 TABLET ORAL
Qty: 100 TAB | Refills: 0 | Status: SHIPPED | OUTPATIENT
Start: 2018-09-10 | End: 2018-12-03

## 2018-07-30 RX ORDER — OXYCODONE AND ACETAMINOPHEN 10; 325 MG/1; MG/1
1 TABLET ORAL
Qty: 90 TAB | Refills: 0 | Status: SHIPPED | OUTPATIENT
Start: 2018-10-09 | End: 2018-12-03 | Stop reason: SDUPTHER

## 2018-07-30 RX ORDER — OXYCODONE AND ACETAMINOPHEN 10; 325 MG/1; MG/1
1 TABLET ORAL
Qty: 110 TAB | Refills: 0 | Status: SHIPPED | OUTPATIENT
Start: 2018-08-11 | End: 2018-12-03

## 2018-07-30 NOTE — PROGRESS NOTES
Pacheco Smoke returns today for f/u of chronic severe pain involving the cervical lumbar spine as well as both hips. She suffers from widespread osteoarthritis as well as a post lumbar laminectomy pain syndrome and trochanteric bursitis. Lumabar laminectomy L4-5 1986. Prior lumbar epidural injections, no improvement from her last injection. TBI's were not helpful. Ms. Annalisa Qureshi continues unchanged since last visit. She has been doing very well with her current treatment plan which has been offering significant pain control. She reports no new complaints today. She does report that she has not used any of her lorazepam in quite some time. I have asked her to go ahead and discontinue lorazepam and discuss alternative treatments with her psychiatrist as soon as possible. She verbally agrees. Her last prescription filled for lorazepam was in October 2017. I did explain to her today that we will have to begin tapering her Percocet. Please see tapering plan below. She is worried about changes to her treatment plan. I have asked her to call and cancel her appointment and pain management agreement if she does decide to transfer care    Her current medication regimen consists of Percocet 10/325 QID PRN and OTC melatonin for sleep. Zoloft, Abilify, and Deplin by her psychiatrist. She is also prescribed Lorazepam which she uses very sparingly. Medications are helping with pain control and quality of life. Her pain is 3-5/10 with medication and 10/10 without. Pt describes pain as aching, sharp, and radiating. Aggravating factors include standing, sitting, walking, while alleviated by rest sitting, rest lying down, avoiding painful activities. Current treatment is helping to improve general activity, mood, walking, sleep, enjoyment of life    Ms. Annalisa Qureshi is tolerating medications well, with no side effects noted. She is able to stay more active with less discomfort with these current doses.  In the past 30 days, the patient reports an average of 50-70% pain relief with current treatment/medications. She is informed of side effects, risks, and benefits of this regimen, and emphasizes that she derives a significant improvement in functionality and quality of life, and notes that non-opioid medications and therapies in the past have not offered significant benefit. She  is otherwise doing well with no other complaints today. She denies any adverse events including nausea, vomiting, dizziness, increased constipation, hallucinations, or seizures. Because the patient's current regimen places him/her at increased risk for possible overdose, a prescription for naloxone nasal spray has been provided. The patient understands that this medication is only to be used in the setting of a possible overdose and that inadvertent use of this medication could precipitate overt withdrawal.    MME: 60  COMM: 4  OSWESTRY: 40 %  POC UDS today. Confirmation pending. Allergies   Allergen Reactions    Morphine Unknown (comments)     Hallucinations  Pt. Denies allergy         Past Surgical History:   Procedure Laterality Date    HX APPENDECTOMY  1962    HX BACK SURGERY  1986    PHLD; L4-L5    HX GASTRIC BYPASS  2009    HX HYSTERECTOMY  1983    uterine cancer, stage 1    HX KNEE REPLACEMENT  2005    right    HX VISHAL AND BSO           Review of Systems   Constitutional: Negative for chills, fever and weight loss. Eyes: Negative for blurred vision and double vision. Respiratory: Negative for shortness of breath and wheezing. Cardiovascular: Negative for chest pain and palpitations. Gastrointestinal: Negative for constipation, diarrhea, heartburn, nausea and vomiting. Musculoskeletal: Positive for back pain, joint pain and neck pain. Skin: Negative for itching and rash. Neurological: Negative for dizziness and headaches. Psychiatric/Behavioral: Negative for depression. The patient is not nervous/anxious. Physical Exam   Constitutional: She is oriented to person, place, and time and well-developed, well-nourished, and in no distress. No distress. HENT:   Head: Normocephalic and atraumatic. Neck: Normal range of motion. Pulmonary/Chest: Effort normal.   Musculoskeletal: Normal range of motion. Neurological: She is alert and oriented to person, place, and time. She has normal reflexes. Gait normal.   Skin: Skin is warm and dry. She is not diaphoretic. No erythema. Psychiatric: Memory, affect and judgment normal.       ASSESSMENT:    Encounter Diagnoses   Name Primary?  Encounter for long-term (current) use of high-risk medication Yes    Chronic left-sided low back pain without sciatica     Irritable bowel syndrome with diarrhea     Postlaminectomy syndrome, lumbar region     Degeneration of lumbar or lumbosacral intervertebral disc     Pain in joint, pelvic region and thigh, left     Trochanteric bursitis of left hip     Neck pain     Degeneration of cervical intervertebral disc     Chronic pain syndrome     Chronic insomnia     Depression with anxiety         [place] Prescription Monitoring Program was reviewed which does not demonstrate aberrancies and/or inconsistencies with regard to the historical prescribing of controlled medications to this patient by other providers. 1. PLAN / Pt Instructions:Modify current plan with no evidence of addiction or diversion. Stable on current medication without adverse events. 2. Refill and adjust oxycodone 10/325 mg up to 4 times daily as needed, no more than #110 per month ×1 month, then adjust down to #100 per month ×1 month, then adjust down to 3 times daily as needed until next visit  3. Continue OTC melatonin as needed. 4. Please discuss lorazepam with your psychiatrist as soon as possible as we will no longer be able to write opioid medications while concurrently using benzodiazepines.   5. Naloxone 4 mg nasal spray for opioid induced respiratory depression emergency only. 6. Discussed risks of addiction, dependency, and opioid induced hyperalgesia. 7. Please remember to call 7 days prior to your medication refill. 8. Return to clinic in 3 months. Please call and cancel your appointment and pain management agreement if you do decide to transfer your care. Medications Ordered Today   Medications    oxyCODONE-acetaminophen (PERCOCET 10)  mg per tablet     Sig: Take 1 Tab by mouth four (4) times daily as needed for Pain for up to 30 doses. Max Daily Amount: 4 Tabs. No more than #110/month     Dispense:  110 Tab     Refill:  0    oxyCODONE-acetaminophen (PERCOCET 10)  mg per tablet     Sig: Take 1 Tab by mouth four (4) times daily as needed for Pain for up to 20 doses. Max Daily Amount: 4 Tabs. No more than #100 per month     Dispense:  100 Tab     Refill:  0    oxyCODONE-acetaminophen (PERCOCET 10)  mg per tablet     Sig: Take 1 Tab by mouth three (3) times daily as needed for Pain for up to 20 doses. Max Daily Amount: 3 Tabs. Dispense:  90 Tab     Refill:  0       DISPOSITION   Pain medications are prescribed with the objective of pain relief and improved physical and psychosocial function in this patient.  Patient has been counseled on proper use of prescribed medications.  Patient has been counseled about chronic medical conditions and their relationship to anxiety and depression and recommended mental health support as needed.  Reviewed with patient self-help tools, home exercise, and lifestyle changes to assist the patient in self-management of symptoms.  Reviewed with patient the treatment plan, goals of treatment plan, and limitations of treatment plan, to include the potential for side effects from medications and procedures. If side effects occur, it is the responsibility of the patient to inform the clinic so that a change in the treatment plan can be made in a safe manner.  The patient is advised that stopping prescribed medication may cause an increase in symptoms and possible medication withdrawal symptoms. The patient is informed an emergency room evaluation may be necessary if this occurs. Spent 25 minutes with patient today which more than 50% of that time was spent on counseling and coordination of care. Wendy Jones 7/30/2018     Note: Please excuse any typographical errors. Voice recognition software was used for this note and may cause mistakes.

## 2018-07-30 NOTE — MR AVS SNAPSHOT
[address] [address] 
[phone] Patient: Micha Brady MRN: [id number] DXN:[id number] Visit Information Date & Time Provider Department Dept. Phone Encounter #  
 7/30/2018  1:40 PM [facility] [id number] Follow-up Instructions Return in about 3 months (around 10/30/2018). Upcoming Health Maintenance Date Due Hepatitis C Screening 1950 DTaP/Tdap/Td series (1 - Tdap) 5/28/1969 ZOSTER VACCINE AGE 60> 3/28/2008 GLAUCOMA SCREENING Q2Y 5/28/2013 Pneumococcal 65+ Low/Medium Risk (2 of 2 - PPSV23) 9/12/2017 MEDICARE YEARLY EXAM 3/14/2018 Influenza Age 5 to Adult 8/1/2018 COLONOSCOPY 4/26/2019 BREAST CANCER SCRN MAMMOGRAM 6/1/2019 Allergies as of 7/30/2018  Review Complete On: 7/30/2018 By: ELI Cabrera Severity Noted Reaction Type Reactions Morphine    Unknown (comments) Hallucinations Pt. Denies allergy Current Immunizations  Reviewed on 1/3/2018 Name Date Influenza High Dose Vaccine PF 1/3/2018, 9/12/2016 Pneumococcal Conjugate (PCV-13) 9/12/2016 Not reviewed this visit You Were Diagnosed With   
  
 Codes Comments Encounter for long-term (current) use of high-risk medication    -  Primary ICD-10-CM: X37.387 ICD-9-CM: V58.69 Chronic left-sided low back pain without sciatica     ICD-10-CM: M54.5, G89.29 ICD-9-CM: 724.2, 338.29 Irritable bowel syndrome with diarrhea     ICD-10-CM: K58.0 ICD-9-CM: 018.0 Postlaminectomy syndrome, lumbar region     ICD-10-CM: M96.1 ICD-9-CM: 722.83 Degeneration of lumbar or lumbosacral intervertebral disc     ICD-10-CM: M51.37 
ICD-9-CM: 722.52 Pain in joint, pelvic region and thigh, left     ICD-10-CM: M25.552 ICD-9-CM: 719.45 Trochanteric bursitis of left hip     ICD-10-CM: M70.62 ICD-9-CM: 726.5 Neck pain     ICD-10-CM: M54.2 ICD-9-CM: 723.1 Degeneration of cervical intervertebral disc     ICD-10-CM: M50.30 ICD-9-CM: 722.4 Chronic pain syndrome     ICD-10-CM: G89.4 ICD-9-CM: 338. 4 Chronic insomnia     ICD-10-CM: F51.04 
ICD-9-CM: 780.52 Depression with anxiety     ICD-10-CM: F41.8 ICD-9-CM: 300.4 Vitals BP Pulse Temp Resp Height(growth percentile) Weight(growth percentile) 139/71 (BP 1 Location: Left arm, BP Patient Position: Sitting) 69 97.1 °F (36.2 °C) (Oral) 14 5' (1.524 m) 140 lb (63.5 kg) BMI OB Status Smoking Status 27.34 kg/m2 Hysterectomy Current Every Day Smoker Vitals History BMI and BSA Data Body Mass Index Body Surface Area  
 27.34 kg/m 2 1.64 m 2 Preferred Pharmacy Pharmacy Name Phone [facility] [address] [place] [phone] Your Updated Medication List  
  
   
This list is accurate as of 7/30/18  2:59 PM.  Always use your most recent med list. amLODIPine 5 mg tablet Commonly known as:  Arthur Miller TAKE 1 TABLET BY MOUTH DAILY  
  
 ARIPiprazole 10 mg tablet Commonly known as:  ABILIFY Take 1/2 to 1 tablet by mouth daily as directed  
  
 azelastine 137 mcg (0.1 %) nasal spray Commonly known as:  ASTELIN  
1 Spray by Both Nostrils route two (2) times a day. Use in each nostril as directed  
  
 calcium-cholecalciferol (D3) tablet Commonly known as:  CALTRATE 600+D Take 1 Tab by mouth daily. carvedilol 12.5 mg tablet Commonly known as:  COREG  
TAKE 1 TABLET BY MOUTH TWICE DAILY WITH MEALS  
  
 cloNIDine HCl 0.1 mg tablet Commonly known as:  CATAPRES  
TAKE 1 TABLET BY MOUTH TWICE DAILY  
  
 colestipol 1 gram tablet Commonly known as:  COLESTID Take 1 g by mouth two (2) times a day. cyanocobalamin 1,000 mcg tablet Take 5,000 mcg by mouth daily. dicyclomine 10 mg capsule Commonly known as:  BENTYL Take 10 mg by mouth 4 times daily (before meals and nightly). folic acid 849 mcg tablet Take 400 mcg by mouth daily. l-methylfolate 15 mg tablet Commonly known as:  Magana Maritza Take 15 mg by mouth daily. LORazepam 0.5 mg tablet Commonly known as:  ATIVAN Take 0.5 Tabs by mouth two (2) times daily as needed. MULTIVITAMIN PO Take 1 Tab by mouth daily. naloxone 4 mg/actuation nasal spray Commonly known as:  NARCAN  
4 mg by Nasal route as needed for up to 2 doses. Indications: OPIOID TOXICITY  
  
 * oxyCODONE-acetaminophen  mg per tablet Commonly known as:  PERCOCET 10 Take 1 Tab by mouth four (4) times daily as needed for Pain for up to 30 doses. Max Daily Amount: 4 Tabs. No more than #110/month Start taking on:  8/11/2018 * oxyCODONE-acetaminophen  mg per tablet Commonly known as:  PERCOCET 10 Take 1 Tab by mouth four (4) times daily as needed for Pain for up to 20 doses. Max Daily Amount: 4 Tabs. No more than #100 per month Start taking on:  9/10/2018 * oxyCODONE-acetaminophen  mg per tablet Commonly known as:  PERCOCET 10 Take 1 Tab by mouth three (3) times daily as needed for Pain for up to 20 doses. Max Daily Amount: 3 Tabs. Start taking on:  10/9/2018 potassium 99 mg tablet Take 99 mg by mouth daily. risedronate 35 mg tablet Commonly known as:  ACTONEL Take 1 tablet each week ZANTAC 150 mg tablet Generic drug:  raNITIdine Take 150 mg by mouth two (2) times a day. ZOLOFT 100 mg tablet Generic drug:  sertraline Take 200 mg by mouth daily. * Notice: This list has 3 medication(s) that are the same as other medications prescribed for you. Read the directions carefully, and ask your doctor or other care provider to review them with you. Prescriptions Printed  Refills  
 oxyCODONE-acetaminophen (PERCOCET 10)  mg per tablet 0  
 Starting on: 8/11/2018 Sig: Take 1 Tab by mouth four (4) times daily as needed for Pain for up to 30 doses. Max Daily Amount: 4 Tabs. No more than #110/month Class: Print Route: Oral  
 oxyCODONE-acetaminophen (PERCOCET 10)  mg per tablet 0 Starting on: 9/10/2018 Sig: Take 1 Tab by mouth four (4) times daily as needed for Pain for up to 20 doses. Max Daily Amount: 4 Tabs. No more than #100 per month Class: Print Route: Oral  
 oxyCODONE-acetaminophen (PERCOCET 10)  mg per tablet 0 Starting on: 10/9/2018 Sig: Take 1 Tab by mouth three (3) times daily as needed for Pain for up to 20 doses. Max Daily Amount: 3 Tabs. Class: Print Route: Oral  
  
We Performed the Following AMB POC DRUG SCREEN () [ [facility]] DRUG SCREEN [[id number] Custom] Follow-up Instructions Return in about 3 months (around 10/30/2018). Patient Instructions 1. Modify current plan with no evidence of addiction or diversion. Stable on current medication without adverse events. 2. Refill and adjust oxycodone 10/325 mg up to 4 times daily as needed, no more than #110 per month ×1 month, then adjust down to #100 per month ×1 month, then adjust down to 3 times daily as needed until next visit 3. Continue OTC melatonin as needed. 4. Please discuss lorazepam with your psychiatrist as soon as possible as we will no longer be able to write opioid medications while concurrently using benzodiazepines. 5. Naloxone 4 mg nasal spray for opioid induced respiratory depression emergency only. 6. Discussed risks of addiction, dependency, and opioid induced hyperalgesia. 7. Please remember to call 7 days prior to your medication refill. 8. Return to clinic in 3 months. Please call and cancel your appointment and pain management agreement if you do decide to transfer your care. Introducing [facility]! Dear Daryle Better: Thank you for requesting a [employer] account. Our records indicate that you have previously registered for a [employer] account but its currently inactive. Please call our [employer] support line at [phone]. Additional Information If you have questions, please visit the Frequently Asked Questions section of the [employer] website at [email or web address] Remember, [employer] is NOT to be used for urgent needs. For medical emergencies, dial 911. Now available from your iPhone and Android! Please provide this summary of care documentation to your next provider. Your primary care clinician is listed as Светлана Reynoso. If you have any questions after today's visit, please call [phone].

## 2018-07-30 NOTE — PATIENT INSTRUCTIONS
1. Modify current plan with no evidence of addiction or diversion. Stable on current medication without adverse events. 2. Refill and adjust oxycodone 10/325 mg up to 4 times daily as needed, no more than #110 per month ×1 month, then adjust down to #100 per month ×1 month, then adjust down to 3 times daily as needed until next visit  3. Continue OTC melatonin as needed. 4. Please discuss lorazepam with your psychiatrist as soon as possible as we will no longer be able to write opioid medications while concurrently using benzodiazepines. 5. Naloxone 4 mg nasal spray for opioid induced respiratory depression emergency only. 6. Discussed risks of addiction, dependency, and opioid induced hyperalgesia. 7. Please remember to call 7 days prior to your medication refill. 8. Return to clinic in 3 months. Please call and cancel your appointment and pain management agreement if you do decide to transfer your care.

## 2018-07-30 NOTE — PROGRESS NOTES
Nursing Notes    Patient presents to the office today in follow-up. Patient rates her pain at 8/10 on the numerical pain scale. Reviewed medications with counts as follows:    Rx Date filled Qty Dispensed Pill Count Last Dose Short   Percocet  mg 07/12/18 120 43 today no                     PHQ over the last two weeks 4/23/2018   PHQ Not Done Active Diagnosis of Depression or Bipolar Disorder   Little interest or pleasure in doing things -   Feeling down, depressed, irritable, or hopeless -   Total Score PHQ 2 -         Comments:     POC UDS was performed in office today    Any new labs or imaging since last appointment? NO    Have you been to an emergency room (ER) or urgent care clinic since your last visit? NO            Have you been hospitalized since your last visit? NO     If yes, where, when, and reason for visit? Have you seen or consulted any other health care providers outside of the [address] [place] [address]  since your last visit? NO     If yes, where, when, and reason for visit? Ms. Caldwell has a reminder for a \"due or due soon\" health maintenance. I have asked that she contact her primary care provider for follow-up on this health maintenance.

## 2018-08-29 DIAGNOSIS — I10 BENIGN ESSENTIAL HYPERTENSION: ICD-10-CM

## 2018-08-29 RX ORDER — CLONIDINE HYDROCHLORIDE 0.1 MG/1
TABLET ORAL
Qty: 180 TAB | Refills: 0 | Status: SHIPPED | OUTPATIENT
Start: 2018-08-29 | End: 2018-11-30 | Stop reason: SDUPTHER

## 2018-09-11 ENCOUNTER — TELEPHONE (OUTPATIENT)
Dept: PAIN MANAGEMENT | Age: 68
End: 2018-09-11

## 2018-09-11 NOTE — TELEPHONE ENCOUNTER
Refill of medicine called in [id number] 10:32am    1. Name, verified  2. Medicine needed - oxycodone  3. [phone]  4. Analgesia - 80% pain relief  5. ADL - YES  6.   Adverse reaction to medicine - none

## 2018-09-11 NOTE — TELEPHONE ENCOUNTER
Sofiya Maurice has called requesting a refill of their controlled medication, percocet, for the management of her chronic back pain. Last office visit date: 07/30/18    Date last  was pulled and reviewed : 09/11/18, last fill date for percocet was 08/12/18    Was the patient compliant when the above report was pulled? yes    Analgesia: pt reports an 80% pain relief with her current opioid medication regimen     Aberrancies: none noted     ADL's: pt reports that she is able to perform her normal daily duties while taking her medication     Adverse Reaction: none reported by pt at this time. Provider's last note and plan of care reviewed? Yes, pre-printed prescription. Request forwarded to provider for review.

## 2018-09-11 NOTE — TELEPHONE ENCOUNTER
Pt was called about her prescriptions. She was not able to be reached. A message was left for the pt and the number to the nurse triage line was provided for the pt.

## 2018-09-24 RX ORDER — CARVEDILOL 12.5 MG/1
TABLET ORAL
Qty: 180 TAB | Refills: 0 | Status: SHIPPED | OUTPATIENT
Start: 2018-09-24 | End: 2018-12-30 | Stop reason: SDUPTHER

## 2018-10-16 ENCOUNTER — TELEPHONE (OUTPATIENT)
Dept: PAIN MANAGEMENT | Age: 68
End: 2018-10-16

## 2018-10-16 NOTE — TELEPHONE ENCOUNTER
Medicine refill called in [id number]  1:43pm    1. Verified  2. Medicine - oxycodone HCI  3.   wk or   4. Analgesia - 80%  5. ADL - yes  6.   Adverse reaction to medicine - no

## 2018-10-17 NOTE — TELEPHONE ENCOUNTER
Brendameeta Borden has called requesting a refill of their controlled medication, Percocet  mg tab, for the management of chronic back pain and bilateral hip pain. Last office visit date: 7/30/18 with Abrahan Tomas PA-C. Patient has f/u appt with Marylynn Lombard on 10/22/18. Date last  was pulled and reviewed : 10/17/18. Last fill date was 9/17/18. Was the patient compliant when the above report was pulled? yes    Analgesia: Per C.S. Patient reports 80% pain relief on current regimen. Aberrancies: None reported in the last 30 days. ADL's: Per C.S. Patient states they are able to perform ADL's on current regimen. Adverse Reaction: Per C.S. Patient reports no adverse reactions at this time. Provider's last note and plan of care reviewed? yes  Request forwarded to provider for review. Prescription pre-printed by Saurabh Crawford and d/t the provider no longer with the practice, will route request to Marylynn Lombard for review.     Last UDS: 7/30/18  Last OCA: 1/19/18

## 2018-10-18 NOTE — TELEPHONE ENCOUNTER
Patient identified using two patient identifiers (name and ); patient advised prescriptions are ready for pick-up. Patient verbalized understanding.

## 2018-10-24 ENCOUNTER — OFFICE VISIT (OUTPATIENT)
Dept: PAIN MANAGEMENT | Age: 68
End: 2018-10-24

## 2018-10-24 VITALS
HEIGHT: 60 IN | BODY MASS INDEX: 27.48 KG/M2 | RESPIRATION RATE: 14 BRPM | SYSTOLIC BLOOD PRESSURE: 148 MMHG | TEMPERATURE: 97.4 F | HEART RATE: 62 BPM | WEIGHT: 140 LBS | DIASTOLIC BLOOD PRESSURE: 69 MMHG

## 2018-10-24 DIAGNOSIS — G89.29 CHRONIC LEFT-SIDED LOW BACK PAIN WITHOUT SCIATICA: Primary | ICD-10-CM

## 2018-10-24 DIAGNOSIS — Z79.899 ENCOUNTER FOR LONG-TERM (CURRENT) USE OF HIGH-RISK MEDICATION: ICD-10-CM

## 2018-10-24 DIAGNOSIS — M54.50 CHRONIC LEFT-SIDED LOW BACK PAIN WITHOUT SCIATICA: Primary | ICD-10-CM

## 2018-10-24 NOTE — PATIENT INSTRUCTIONS

## 2018-10-24 NOTE — PROGRESS NOTES
Nursing Notes Patient presents to the office today in follow-up. Patient rates her pain at 6/10 on the numerical pain scale. Reviewed medications with counts as follows:   
Rx Date filled Qty Dispensed Pill Count Last Dose Short Percocet 10/325 mg 10/19/18 90 82 today no Last opioid agreement 01/19/18 Last urine drug screen 07/30/18 Comments: POC UDS was not performed in office today Any new labs or imaging since last appointment? YES. Pt states she had some imaging done for her infected tooth Have you been to an emergency room (ER) or urgent care clinic since your last visit? NO Have you been hospitalized since your last visit? NO If yes, where, when, and reason for visit? Have you seen or consulted any other health care providers outside of the [address] [place] [address]  since your last visit? YES If yes, where, when, and reason for visit? dentist 
Ms. Judy Lopez has a reminder for a \"due or due soon\" health maintenance. I have asked that she contact her primary care provider for follow-up on this health maintenance. PHQ over the last two weeks 10/24/2018 PHQ Not Done - Little interest or pleasure in doing things Not at all Feeling down, depressed, irritable, or hopeless Not at all Total Score PHQ 2 0 Trouble falling or staying asleep, or sleeping too much Several days Feeling tired or having little energy Nearly every day Poor appetite, weight loss, or overeating Several days Feeling bad about yourself - or that you are a failure or have let yourself or your family down Not at all Trouble concentrating on things such as school, work, reading, or watching TV Several days Moving or speaking so slowly that other people could have noticed; or the opposite being so fidgety that others notice Not at all Thoughts of being better off dead, or hurting yourself in some way Not at all PHQ 9 Score 6  
 How difficult have these problems made it for you to do your work, take care of your home and get along with others Not difficult at all

## 2018-10-24 NOTE — PROGRESS NOTES
Referral date , source  . Social History Socioeconomic History  Marital status:  Spouse name: Not on file  Number of children: Not on file  Years of education: Not on file  Highest education level: Not on file Social Needs  Financial resource strain: Not on file  Food insecurity - worry: Not on file  Food insecurity - inability: Not on file  Transportation needs - medical: Not on file  Transportation needs - non-medical: Not on file Occupational History  Not on file Tobacco Use  Smoking status: Current Every Day Smoker Packs/day: 0.25 Types: Cigarettes  Smokeless tobacco: Never Used  Tobacco comment: 6 ciggerates per day Substance and Sexual Activity  Alcohol use: Yes Comment: socially  Drug use: No  
  Comment: denies  Sexual activity: No  
  Partners: Male Other Topics Concern  Not on file Social History Narrative  Not on file Family History Problem Relation Age of Onset  Cancer Mother   
     lung-nonsmoker  Diabetes Other  Heart Disease Other Allergies Allergen Reactions  Morphine Unknown (comments) Hallucinations Pt. Denies allergy Past Medical History:  
Diagnosis Date  Abdominal pain  Benign essential hypertension  Cancer ([facility] [address] uterine  Depressive disorder  Fatigue  Hypertension  Insomnia  NATHANAEL (obstructive sleep apnea) does not use cpap machine  Osteoarthritis  Peptic ulcer 2012  Spinal stenosis   
 with chronic pain Past Surgical History:  
Procedure Laterality Date [facility] PHLD; L4-L5  HX GASTRIC BYPASS  2009  HX HYSTERECTOMY  1983  
 uterine cancer, stage 1  
 HX KNEE REPLACEMENT  2005  
 right  HX VISHAL AND BSO Current Outpatient Medications on File Prior to Visit Medication Sig  carvedilol (COREG) 12.5 mg tablet TAKE 1 TABLET BY MOUTH TWICE DAILY WITH MEALS  cloNIDine HCl (CATAPRES) 0.1 mg tablet TAKE 1 TABLET BY MOUTH TWICE DAILY  oxyCODONE-acetaminophen (PERCOCET 10)  mg per tablet Take 1 Tab by mouth three (3) times daily as needed for Pain for up to 20 doses. Max Daily Amount: 3 Tabs.  calcium-cholecalciferol, D3, (CALTRATE 600+D) tablet Take 1 Tab by mouth daily.  amLODIPine (NORVASC) 5 mg tablet TAKE 1 TABLET BY MOUTH DAILY  potassium 99 mg tablet Take 99 mg by mouth daily.  dicyclomine (BENTYL) 10 mg capsule Take 10 mg by mouth 4 times daily (before meals and nightly).  ARIPiprazole (ABILIFY) 10 mg tablet Take 1/2 to 1 tablet by mouth daily as directed  raNITIdine (ZANTAC) 150 mg tablet Take 150 mg by mouth two (2) times a day.  l-methylfolate (DEPLIN) 15 mg tablet Take 15 mg by mouth daily.  colestipol (COLESTID) 1 gram tablet Take 1 g by mouth two (2) times a day.  sertraline (ZOLOFT) 100 mg tablet Take 200 mg by mouth daily.  oxyCODONE-acetaminophen (PERCOCET 10)  mg per tablet Take 1 Tab by mouth four (4) times daily as needed for Pain for up to 30 doses. Max Daily Amount: 4 Tabs. No more than #110/month  oxyCODONE-acetaminophen (PERCOCET 10)  mg per tablet Take 1 Tab by mouth four (4) times daily as needed for Pain for up to 20 doses. Max Daily Amount: 4 Tabs. No more than #100 per month  risedronate (ACTONEL) 35 mg tablet Take 1 tablet each week  folic acid 007 mcg tablet Take 400 mcg by mouth daily.  naloxone 4 mg/actuation spry 4 mg by Nasal route as needed for up to 2 doses. Indications: OPIOID TOXICITY  cyanocobalamin 1,000 mcg tablet Take 5,000 mcg by mouth daily.  LORazepam (ATIVAN) 0.5 mg tablet Take 0.5 Tabs by mouth two (2) times daily as needed.  azelastine (ASTELIN) 137 mcg (0.1 %) nasal spray 1 [place] by Both Nostrils route two (2) times a day. Use in each nostril as directed  MULTIVITAMIN PO Take 1 Tab by mouth daily. No current facility-administered medications on file prior to visit. HPI: 
Thor Cabezas is a 77 y.o. female here for f/u visit for ongoing evaluation of back pain and bilateral hip pain. Pt was last seen here on 7/30/18  . Pt denies interval changes in the character or distribution of pain. Pain is located as a stabbing pain along the lumbar spine and mid with some radiating achy pain down posterior lateral right thigh to the knee. Pain ranges from 4-8/10. Taking Percocet 10/325 up to 3 times daily for pain. ROS:Review of systems is negative for    chills, nausea, vomiting,   constipation, chest pain, shortness of breath, abdominal pain, weakness, trouble swallowing, acute changes in vision, acute changes in hearing, falls, dizziness, bladder incontinence, bowel incontinence, depression, anxiety, suicidal ideation, homicidal ideation, alcohol use. Review of systems positive for fever from a tooth abscess, diarrhea from antibiotics. Opioid specific risk: Depression, obstructive sleep apnea, history of gastric bypass surgery, intermittent alcohol use, Vitals:  
 10/24/18 1429 BP: 148/69 Pulse: 62 Resp: 14 Temp: 97.4 °F (36.3 °C) TempSrc: Oral  
Weight: 63.5 kg (140 lb) Height: 5' (1.524 m) PainSc:   7 PainLoc: Back Imaging: No lumbar imaging available more recent than 2011 MRI 
 
PE: 
AFVSS, no acute distress, normal body habitus. A&OXs 3. 
normocephalic, atraumatic. Conjugate gaze, clear sclerae. Speech is clear and appropriate. Patient is cooperative. Gait is within functional limits. Balance is within functional limits. Calculated MEQ - 60 Naloxone rescue -yes Prophylactic bowel program - Date of last OCA Jan 2018. Last UDS 7/30/18 , date checked today, findings consistent Primary Care Physician Rachel DEL VALLE 
[address]  [id number] 
[phone] GIC-6 and 4 
 
LYN -46% COMM- 6 
 
PHQ -- . PHQ over the last two weeks 10/24/2018 PHQ Not Done - Little interest or pleasure in doing things Not at all Feeling down, depressed, irritable, or hopeless Not at all Total Score PHQ 2 0 Trouble falling or staying asleep, or sleeping too much Several days Feeling tired or having little energy Nearly every day Poor appetite, weight loss, or overeating Several days Feeling bad about yourself - or that you are a failure or have let yourself or your family down Not at all Trouble concentrating on things such as school, work, reading, or watching TV Several days Moving or speaking so slowly that other people could have noticed; or the opposite being so fidgety that others notice Not at all Thoughts of being better off dead, or hurting yourself in some way Not at all PHQ 9 Score 6 How difficult have these problems made it for you to do your work, take care of your home and get along with others Not difficult at all Assessment/Plan: ICD-10-CM ICD-9-CM 1. Chronic left-sided low back pain without sciatica M54.5 724.2 XR SPINE LUMB MIN 4 V  
 G89.29 338.29   
2. Encounter for long-term (current) use of high-risk medication Z79.899 V58.69   
  
 abbreviated visit as just when we were getting started the patient noticed at the time and had to rush out to make it to her next doctor's appointment. We were able to discern that she needed updated lumbar x-rays. Patient will return for next available follow-up appointment for ongoing assessment and treatment. We will continue the opioid wean has already been initiated. Patient stated she was tolerating opioid wean. Patient was educated on signs and symptoms of opioid withdrawal and advised to call the clinic should these symptoms arise so that we may provide support as needed. Assessment and development of a comprehensive plan of care for her chronic pain is ongoing.  
 
GOALS: 
 To establish complementary and integrative plan of care to address chronic pain issues while minimizing pharmaceuticals to maximize patient's function improve quality of life. Education: 
Patient again educated on the importance of strict compliance with the opioid care agreement while on opioid therapy. Patient also again educated that they should avoid driving while on chronic opioid therapy. Also advised to avoid alcohol and to avoid benzodiazepines while on opioid therapy. F/u:. Follow-up Disposition: Not on File

## 2018-10-29 RX ORDER — AMLODIPINE BESYLATE 5 MG/1
TABLET ORAL
Qty: 90 TAB | Refills: 0 | Status: SHIPPED | OUTPATIENT
Start: 2018-10-29 | End: 2019-01-21 | Stop reason: SDUPTHER

## 2018-11-30 DIAGNOSIS — M51.37 DEGENERATION OF LUMBAR OR LUMBOSACRAL INTERVERTEBRAL DISC: ICD-10-CM

## 2018-11-30 DIAGNOSIS — M54.50 CHRONIC LEFT-SIDED LOW BACK PAIN WITHOUT SCIATICA: ICD-10-CM

## 2018-11-30 DIAGNOSIS — M70.62 TROCHANTERIC BURSITIS OF LEFT HIP: ICD-10-CM

## 2018-11-30 DIAGNOSIS — G89.4 CHRONIC PAIN SYNDROME: ICD-10-CM

## 2018-11-30 DIAGNOSIS — F51.04 CHRONIC INSOMNIA: ICD-10-CM

## 2018-11-30 DIAGNOSIS — M96.1 POSTLAMINECTOMY SYNDROME, LUMBAR REGION: ICD-10-CM

## 2018-11-30 DIAGNOSIS — M54.2 NECK PAIN: ICD-10-CM

## 2018-11-30 DIAGNOSIS — K58.0 IRRITABLE BOWEL SYNDROME WITH DIARRHEA: ICD-10-CM

## 2018-11-30 DIAGNOSIS — Z79.899 ENCOUNTER FOR LONG-TERM (CURRENT) USE OF HIGH-RISK MEDICATION: ICD-10-CM

## 2018-11-30 DIAGNOSIS — G89.29 CHRONIC LEFT-SIDED LOW BACK PAIN WITHOUT SCIATICA: ICD-10-CM

## 2018-11-30 DIAGNOSIS — M25.552 PAIN IN JOINT, PELVIC REGION AND THIGH, LEFT: ICD-10-CM

## 2018-11-30 DIAGNOSIS — M50.30 DEGENERATION OF CERVICAL INTERVERTEBRAL DISC: ICD-10-CM

## 2018-11-30 DIAGNOSIS — I10 BENIGN ESSENTIAL HYPERTENSION: ICD-10-CM

## 2018-11-30 DIAGNOSIS — F41.8 DEPRESSION WITH ANXIETY: ICD-10-CM

## 2018-11-30 RX ORDER — CLONIDINE HYDROCHLORIDE 0.1 MG/1
TABLET ORAL
Qty: 180 TAB | Refills: 0 | Status: SHIPPED | OUTPATIENT
Start: 2018-11-30 | End: 2019-03-07 | Stop reason: SDUPTHER

## 2018-11-30 NOTE — TELEPHONE ENCOUNTER
Patient LVM on nurse triage line requesting a refill for Oxycodone, which provides 75% relief of pain, allows her to perform her daily functions, and causes no side effects. Last OV 10/24/18, next OV 01/21/19.

## 2018-12-03 RX ORDER — OXYCODONE AND ACETAMINOPHEN 10; 325 MG/1; MG/1
1 TABLET ORAL
Qty: 80 TAB | Refills: 0 | Status: SHIPPED | OUTPATIENT
Start: 2018-12-03 | End: 2019-01-03 | Stop reason: SDUPTHER

## 2018-12-03 NOTE — TELEPHONE ENCOUNTER
Attempted to contact patient regarding prescription pick-up ; no answer noted at number given; vm left to contact triage line. Need to remind patient  Receiving 80 tablets to be budgeted ove 30 days. Remind patient to obtain lumbar plain film xray ordered at last visit. Remind patient to avoid use of alcohol while on chronic opioids.

## 2018-12-03 NOTE — TELEPHONE ENCOUNTER
Marisol Samuels has called requesting a refill of their controlled medication, oxycodone, for the management of back pain. Last office visit date: 10/24/18 with Nathan Jones, next 1/21/19 with Nathan Jones  Last opioid care agreement 1/19/18  Last UDS was done 8/2/18    Date last  was pulled and reviewed : 12/3/18 last filled 10/19/18    Was the patient compliant when the above report was pulled? yes    Analgesia: 75%    Aberrancies: none    ADL's: yes    Adverse Reaction: no    Provider's last note and plan of care reviewed? yes  Request forwarded to provider for review.

## 2018-12-03 NOTE — TELEPHONE ENCOUNTER
Please remind patient of the dosing schedule and that she has 80 tablets to be budgeted over 30 days. Please remind patient to obtain a lumbar plain film x-rays that were ordered last visit. Please remind patient to avoid use of alcohol while on chronic opioid therapy. Reviewed. OK to distribute prescription.

## 2018-12-04 NOTE — TELEPHONE ENCOUNTER
Patient identified using two patient identifiers (name and ); patient advised prescriptions are ready for pick-up. Patient reminded of receiving 80 tablets to be budgeted over 30 day,to obtain lumbar plain film xray ordered at last visit, and to avoid use of alcohol while on chronic opioids.

## 2018-12-07 ENCOUNTER — HOSPITAL ENCOUNTER (OUTPATIENT)
Dept: GENERAL RADIOLOGY | Age: 68
Discharge: HOME | End: 2018-12-07
Payer: MEDICARE

## 2018-12-07 DIAGNOSIS — M54.50 CHRONIC LEFT-SIDED LOW BACK PAIN WITHOUT SCIATICA: ICD-10-CM

## 2018-12-07 DIAGNOSIS — G89.29 CHRONIC LEFT-SIDED LOW BACK PAIN WITHOUT SCIATICA: ICD-10-CM

## 2018-12-07 PROCEDURE — 72110 X-RAY EXAM L-2 SPINE 4/>VWS: CPT

## 2018-12-31 RX ORDER — CARVEDILOL 12.5 MG/1
TABLET ORAL
Qty: 180 TAB | Refills: 0 | Status: SHIPPED | OUTPATIENT
Start: 2018-12-31 | End: 2019-01-29 | Stop reason: SDUPTHER

## 2019-01-03 DIAGNOSIS — M96.1 POSTLAMINECTOMY SYNDROME, LUMBAR REGION: ICD-10-CM

## 2019-01-03 DIAGNOSIS — M51.37 DEGENERATION OF LUMBAR OR LUMBOSACRAL INTERVERTEBRAL DISC: ICD-10-CM

## 2019-01-03 RX ORDER — OXYCODONE AND ACETAMINOPHEN 10; 325 MG/1; MG/1
1 TABLET ORAL
Qty: 80 TAB | Refills: 0 | Status: SHIPPED | OUTPATIENT
Start: 2019-01-03 | End: 2019-01-21 | Stop reason: SDUPTHER

## 2019-01-03 NOTE — TELEPHONE ENCOUNTER
Please obtain updated urine drug screen. Please obtain updated opioid care agreement. Reviewed. OK to distribute prescription.

## 2019-01-03 NOTE — TELEPHONE ENCOUNTER
Anderson Seaman has called requesting a refill of their controlled medication, Percocet 10/325mg, for the management of back pain. .    Last office visit date: 10/24/18  Last opioid care agreement 1/18  Last UDS was done 7/30/18    Date last  was pulled and reviewed : 01/3/19    Was the patient compliant when the above report was pulled? yes    Analgesia: Patient reports 80% pain relief on current regimen. Aberrancies: No aberrancies noted in the last 30 days. ADL's: Patient states they are able to perform ADL's on current regimen. Adverse Reaction: Patient reports no adverse reactions at this time. Provider's last note and plan of care reviewed? yes  Request forwarded to provider for review.

## 2019-01-04 NOTE — TELEPHONE ENCOUNTER
09:48 am the patient was notified that her Rx for Oxycodone is ready for . The patient was instructed that she must come herself. The reasons:  Sign a new OCA  UDS. The patient verbalized understanding.

## 2019-01-07 ENCOUNTER — OFFICE VISIT (OUTPATIENT)
Dept: PAIN MANAGEMENT | Age: 69
End: 2019-01-07

## 2019-01-07 DIAGNOSIS — Z79.899 ENCOUNTER FOR LONG-TERM (CURRENT) USE OF HIGH-RISK MEDICATION: Primary | ICD-10-CM

## 2019-01-07 LAB
ALCOHOL UR POC: NORMAL
AMPHETAMINES UR POC: NEGATIVE
BARBITURATES UR POC: NORMAL
BENZODIAZEPINES UR POC: NEGATIVE
BUPRENORPHINE UR POC: NEGATIVE
CANNABINOIDS UR POC: NEGATIVE
CARISOPRODOL UR POC: NORMAL
COCAINE UR POC: NEGATIVE
FENTANYL UR POC: NORMAL
MDMA/ECSTASY UR POC: NORMAL
METHADONE UR POC: NEGATIVE
METHAMPHETAMINE UR POC: NORMAL
METHYLPHENIDATE UR POC: NORMAL
OPIATES UR POC: NEGATIVE
OXYCODONE UR POC: NORMAL
PHENCYCLIDINE UR POC: NORMAL
PROPOXYPHENE UR POC: NORMAL
TRAMADOL UR POC: NORMAL
TRICYCLICS UR POC: NORMAL

## 2019-01-21 ENCOUNTER — OFFICE VISIT (OUTPATIENT)
Dept: PAIN MANAGEMENT | Age: 69
End: 2019-01-21

## 2019-01-21 VITALS
RESPIRATION RATE: 14 BRPM | HEIGHT: 60 IN | OXYGEN SATURATION: 93 % | SYSTOLIC BLOOD PRESSURE: 134 MMHG | TEMPERATURE: 97.4 F | DIASTOLIC BLOOD PRESSURE: 77 MMHG | WEIGHT: 138 LBS | BODY MASS INDEX: 27.09 KG/M2 | HEART RATE: 67 BPM

## 2019-01-21 DIAGNOSIS — M54.2 NECK PAIN: ICD-10-CM

## 2019-01-21 DIAGNOSIS — G89.4 CHRONIC PAIN SYNDROME: Primary | ICD-10-CM

## 2019-01-21 DIAGNOSIS — M54.50 CHRONIC LEFT-SIDED LOW BACK PAIN WITHOUT SCIATICA: ICD-10-CM

## 2019-01-21 DIAGNOSIS — M96.1 POSTLAMINECTOMY SYNDROME, LUMBAR REGION: ICD-10-CM

## 2019-01-21 DIAGNOSIS — M51.37 DEGENERATION OF LUMBAR OR LUMBOSACRAL INTERVERTEBRAL DISC: ICD-10-CM

## 2019-01-21 DIAGNOSIS — G89.29 CHRONIC LEFT-SIDED LOW BACK PAIN WITHOUT SCIATICA: ICD-10-CM

## 2019-01-21 DIAGNOSIS — Z79.899 ENCOUNTER FOR LONG-TERM (CURRENT) USE OF HIGH-RISK MEDICATION: ICD-10-CM

## 2019-01-21 DIAGNOSIS — M25.552 CHRONIC HIP PAIN, BILATERAL: ICD-10-CM

## 2019-01-21 DIAGNOSIS — M25.551 CHRONIC HIP PAIN, BILATERAL: ICD-10-CM

## 2019-01-21 DIAGNOSIS — G89.29 CHRONIC HIP PAIN, BILATERAL: ICD-10-CM

## 2019-01-21 RX ORDER — GABAPENTIN 100 MG/1
100 CAPSULE ORAL
Qty: 30 CAP | Refills: 0 | Status: SHIPPED | OUTPATIENT
Start: 2019-01-21 | End: 2019-02-21 | Stop reason: SDUPTHER

## 2019-01-21 RX ORDER — OXYCODONE AND ACETAMINOPHEN 10; 325 MG/1; MG/1
1 TABLET ORAL
Qty: 75 TAB | Refills: 0 | Status: SHIPPED | OUTPATIENT
Start: 2019-01-21 | End: 2019-01-21

## 2019-01-21 RX ORDER — CAPSAICIN 0.1 %
CREAM (GRAM) TOPICAL
Qty: 60 G | Refills: 2 | Status: SHIPPED | OUTPATIENT
Start: 2019-01-21 | End: 2019-04-19

## 2019-01-21 RX ORDER — OMEPRAZOLE 20 MG/1
20 TABLET, DELAYED RELEASE ORAL DAILY
COMMUNITY

## 2019-01-21 RX ORDER — OXYCODONE AND ACETAMINOPHEN 10; 325 MG/1; MG/1
1 TABLET ORAL
Qty: 80 TAB | Refills: 0 | Status: SHIPPED | OUTPATIENT
Start: 2019-02-05 | End: 2019-02-21 | Stop reason: SDUPTHER

## 2019-01-21 NOTE — PROGRESS NOTES
Referral date 8/4/2009, source Dr Radha Baez and for neck and back pain. HPI: 
Ward Salvador is a 77 y.o. female here for f/u visit for ongoing evaluation of back and bilateral hip pain. She is s/p L4/5 fusion. Pt was last seen here on 10/24/18. Pt denies interval changes on the character or distribution of pain. Pain is located along cervical spine described as stabbing, along thoracic spine described as aching and along lumbar spine described as stabbing. Pain is also located to left hip. Pain at its best is 3/10. Pain at its worse is 9/10. The pain is worsened by cold rainy weather, lifting heavy objects, vacuuming, prolonged walking, prolonged sitting and prolonged standing. She states TENS unit use, massage therapy and chiropractor therapy worsened her pain. Symptoms are improved by Tylenol, Percocet, topical [place], topical Aspercreme, heat and PT for hip & back about 1 year ago. Pt has tried compound cream, ice and epidural steroid injections with no perceived benefit. Elavil in the past, not sure if it was for pain. Gabapentin \"along time ago\" that helped; she is interested in trying this today. She has never tried aquatic PT, acupuncture, yoga, SCS trial, implantable pain pump, Cymbalta, Topamax or Lyrica. She is not interested in Lyrica. Social History Socioeconomic History  Marital status:  Spouse name: Not on file  Number of children: Not on file  Years of education: Not on file  Highest education level: Not on file Social Needs  Financial resource strain: Not on file  Food insecurity - worry: Not on file  Food insecurity - inability: Not on file  Transportation needs - medical: Not on file  Transportation needs - non-medical: Not on file Occupational History  Not on file Tobacco Use  Smoking status: Current Every Day Smoker Packs/day: 0.25 Types: Cigarettes  Smokeless tobacco: Never Used  Tobacco comment: 6 ciggerates per day Substance and Sexual Activity  Alcohol use: Yes Comment: socially  Drug use: No  
  Comment: denies  Sexual activity: No  
  Partners: Male Other Topics Concern  Not on file Social History Narrative  Not on file Family History Problem Relation Age of Onset  Cancer Mother   
     lung-nonsmoker  Diabetes Other  Heart Disease Other Allergies Allergen Reactions  Morphine Unknown (comments) Hallucinations Pt. Denies allergy Past Medical History:  
Diagnosis Date  Abdominal pain  Benign essential hypertension  Cancer ([address] uterine  Depressive disorder  Fatigue  Hypertension  Insomnia  NATHANAEL (obstructive sleep apnea) does not use cpap machine  Osteoarthritis  Peptic ulcer 2012  Spinal stenosis   
 with chronic pain Past Surgical History:  
[facility]  HX GASTRIC BYPASS  2009  HX HYSTERECTOMY  1983  
 uterine cancer, stage 1  
 HX KNEE REPLACEMENT  2005  
 right  HX VISHAL AND BSO Current Outpatient Medications on File Prior to Visit Medication Sig  
 omeprazole (PRILOSEC OTC) 20 mg tablet Take 20 mg by mouth daily.  carvedilol (COREG) 12.5 mg tablet TAKE 1 TABLET BY MOUTH TWICE DAILY WITH MEALS  cloNIDine HCl (CATAPRES) 0.1 mg tablet TAKE 1 TABLET BY MOUTH TWICE DAILY  amLODIPine (NORVASC) 5 mg tablet TAKE 1 TABLET BY MOUTH DAILY  potassium 99 mg tablet Take 99 mg by mouth daily.  dicyclomine (BENTYL) 10 mg capsule Take 10 mg by mouth 4 times daily (before meals and nightly).  ARIPiprazole (ABILIFY) 10 mg tablet Take 1/2 to 1 tablet by mouth daily as directed  naloxone 4 mg/actuation spry 4 mg by Nasal route as needed for up to 2 doses. Indications: OPIOID TOXICITY  l-methylfolate (DEPLIN) 15 mg tablet Take 15 mg by mouth daily.  colestipol (COLESTID) 1 gram tablet Take 1 g by mouth two (2) times a day.  azelastine (ASTELIN) 137 mcg (0.1 %) nasal spray 1 [place] by Both Nostrils route two (2) times a day. Use in each nostril as directed  sertraline (ZOLOFT) 100 mg tablet Take 200 mg by mouth daily. No current facility-administered medications on file prior to visit. ROS: 
Denies fever, chills, nausea, vomiting, diarrhea, constipation, abdominal pain, chest pain, shortness or breath/trouble breathing, weakness, trouble swallowing, changes in vision, changes in hearing, falls, dizziness, bladder incontinence, bowel incontinence, depression, anxiety, suicidal ideations, homicidal ideations or alcohol use. Opioid specific risk: depression, obstructive sleep apnea, history of gastric bypass surgery, intermittent alcohol use, insomnia, tobacco use. Vitals:  
 01/21/19 9026 BP: 134/77 Pulse: 67 Resp: 14 Temp: 97.4 °F (36.3 °C) TempSrc: Oral  
SpO2: 93% Weight: 62.6 kg (138 lb) Height: 5' (1.524 m) PainSc:   7 PainLoc: Neck Imaging: XR Lumbar Spine 12/7/18 \"\"FINDINGS:  
5 views of the lumbar spine are obtained. There are 5 lumbar type vertebral bodies. There has been prior fusion at L4-5. Bilateral pedicle screw and osmany fixation. Hardware appears intact without evidence of loosening. Dextroconvex scoliosis. There is diffuse disc space loss most pronounced at L3-4, L4-5 and L5-S1. There are some sclerotic bony endplate changes. Small 
anterior and lateral marginal osteophytes. There are degenerative changes of thefacets. There is no fracture. There is a 2 mm anterior listhesis of L3 on L4. No destructive osseous lesion. IMPRESSION:  
1. No acute osseous abnormality. 2. Degenerative changes as above. 3. Intact postsurgical changes. \"\" Labs: BUN/Cr: 17/1.03 on 2/22/17 AST/ALT: 14/17 on 2/22/17 Physical exam: 
AFVSS, no acute distress, normal body habitus. A&OXs 3. Normocephalic, atraumatic. Conjugate gaze, clear sclerae. Speech is clear and appropriate. Mood is appropriate and patient is cooperative. Gait and balance are within functional limits. Non-labored breathing. LE:  
AROM lumbar flexion decreased by ~50% with reproduction of primary pain. Full AROM lumbar extension without reproduction of primary pain. Strength for right lower extremity is 5/5 for hip flexion, knee extension, dorsiflexion, extensor hallucis longus, plantar flexion. Strength for left lower extremity is 5/5 for hip flexion, knee extension, dorsiflexion, extensor hallucis longus, plantar flexion. Sensation to light touch is decreased for right L3 and intact for right L2, L4-S2. Sensation to light touch is intact for left L2-S2. Negative ankle clonus on the right and the left. Negative seated straight leg raise bilaterally. TTP bilateral greater trochanteric bursae and midline lumbar region. UE:  
Full AROM cervical flexion with reproduction of primary pain. AROM cervical extension decreased by ~50% with reproduction of primary pain. AROM cervical rotation decreased by ~75% to the right with reproduction of primary pain. AROM cervical rotation decreased by ~25% to the left with reproduction of primary pain. Pt declines palpation of her cervical region. Calculated MEQ - 45 Naloxone rescue - yes Prophylactic bowel program - no Date of last OCA 1/7/19 Last UDS 1/7/19, consistent POC, pending confirmatory testing  date checked today, findings consistent Primary Care Physician Cammy DEL VALLE 
[address]  [address] 
[phone] Today   Last Visit PGIC - 6 & 6  6 & 4 
LYN - 42%  46% COMM - 4  6 PHQ -- . PHQ over the last two weeks 1/21/2019 PHQ Not Done - Little interest or pleasure in doing things Not at all Feeling down, depressed, irritable, or hopeless Not at all Total Score PHQ 2 0  
 Trouble falling or staying asleep, or sleeping too much - Feeling tired or having little energy - Poor appetite, weight loss, or overeating - Feeling bad about yourself - or that you are a failure or have let yourself or your family down - Trouble concentrating on things such as school, work, reading, or watching TV - Moving or speaking so slowly that other people could have noticed; or the opposite being so fidgety that others notice - Thoughts of being better off dead, or hurting yourself in some way -  
PHQ 9 Score - How difficult have these problems made it for you to do your work, take care of your home and get along with others - Assessment/Plan: ICD-10-CM ICD-9-CM 1. Chronic pain syndrome G89.4 338.4 capsaicin (CAPZASIN-HP) 0.1 % topical cream  
   gabapentin (NEURONTIN) 100 mg capsule  
   oxyCODONE-acetaminophen (PERCOCET 10)  mg per tablet DISCONTINUED: oxyCODONE-acetaminophen (PERCOCET 10)  mg per tablet 2. Encounter for long-term (current) use of high-risk medication Z79.899 V58.69 3. Postlaminectomy syndrome, lumbar region M96.1 722.83 capsaicin (CAPZASIN-HP) 0.1 % topical cream  
   gabapentin (NEURONTIN) 100 mg capsule  
   oxyCODONE-acetaminophen (PERCOCET 10)  mg per tablet DISCONTINUED: oxyCODONE-acetaminophen (PERCOCET 10)  mg per tablet 4. Degeneration of lumbar or lumbosacral intervertebral disc M51.37 722.52 capsaicin (CAPZASIN-HP) 0.1 % topical cream  
   gabapentin (NEURONTIN) 100 mg capsule  
   oxyCODONE-acetaminophen (PERCOCET 10)  mg per tablet DISCONTINUED: oxyCODONE-acetaminophen (PERCOCET 10)  mg per tablet 5. Chronic left-sided low back pain without sciatica M54.5 724.2 capsaicin (CAPZASIN-HP) 0.1 % topical cream  
 G89.29 338.29 gabapentin (NEURONTIN) 100 mg capsule  
   oxyCODONE-acetaminophen (PERCOCET 10)  mg per tablet DISCONTINUED: oxyCODONE-acetaminophen (PERCOCET 10)  mg per tablet 6. Neck pain M54.2 723.1 7. Chronic hip pain, bilateral M25.551 719.45   
 M25.552 338.29 G89.29 Do not recommend long term opioid therapy for this patient at this time for their chronic pain; the risks outweigh the potential benefits. Pt currently taking Percocet 10/325mg up to 3 times a day as needed with a total of 80 tabs to be budgeted over 30 days. Their MME is 39. Today, we will continue with her current dosing and continue the weaning of patients opioid medication with a goal of being opioid free, pending safety and compliance at next office visit in 30 days. Pt instructed to call if they experience any signs of withdrawal (diarrhea, nausea, vomiting, sweating or chills, agitation, itching). At next office visit, the plan is to provide patient with Percocet 10/325mg up to 3 times a day as needed with a total of 75 tabs to be budgeted over 30 days. If patient has difficulty with the wean or difficulty with cravings we will consider referral to mental health for ongoing assessment and treatment for opioid use disorder. Trial of Gabapentin 100mg QHS ordered today. Topical capsaicin cream ordered today. Consider bilateral greater trochanteric bursae injections with Dr Juventino Huerta in the future. Pt may also benefit from aquatic PT, acupuncture therapy and/or yoga therapy in the future. Discuss smoking cessation at next visit. Follow up ongoing assessment and ongoing development of integrative and comprehensive plan of care for chronic pain. Goals: To establish complementary and integrative plan of care to address chronic pain issues while minimizing pharmaceuticals to maximize patient's function improve quality of life. Education: 
Patient again educated on the importance of strict compliance with the opioid care agreement while on opioid therapy.   Patient also again educated that they should avoid driving while on chronic opioid therapy. Also advised to avoid alcohol and to avoid benzodiazepines while on opioid therapy. Handouts given regarding opioid safety. Follow-up Disposition: 
Return in about 30 days (around 2/20/2019) for 30 min. - f/u regarding trial of Gabapentin QHS started today [address] was used for portions of this report. Unintended errors may occur.

## 2019-01-21 NOTE — PROGRESS NOTES
Nursing Notes Patient presents to the office today in follow-up. Patient rates her pain at 7/10 on the numerical pain scale. Reviewed medications with counts as follows:   
Rx Date filled Qty Dispensed Pill Count Last Dose Short Oxycodone  mg 1/7/19 80 65 TODAY NO  
       
       
       
          
 
Last opioid agreement 1/7/19 Last urine drug screen 1/7/19 PHQ over the last two weeks 10/24/2018 PHQ Not Done - Little interest or pleasure in doing things Not at all Feeling down, depressed, irritable, or hopeless Not at all Total Score PHQ 2 0 Trouble falling or staying asleep, or sleeping too much Several days Feeling tired or having little energy Nearly every day Poor appetite, weight loss, or overeating Several days Feeling bad about yourself - or that you are a failure or have let yourself or your family down Not at all Trouble concentrating on things such as school, work, reading, or watching TV Several days Moving or speaking so slowly that other people could have noticed; or the opposite being so fidgety that others notice Not at all Thoughts of being better off dead, or hurting yourself in some way Not at all PHQ 9 Score 6 How difficult have these problems made it for you to do your work, take care of your home and get along with others Not difficult at all Comments: POC UDS was not performed in office today Any new labs or imaging since last appointment? NO Have you been to an emergency room (ER) or urgent care clinic since your last visit? NO Have you been hospitalized since your last visit? NO If yes, where, when, and reason for visit? Have you seen or consulted any other health care providers outside of the [address] [place] [address]  since your last visit? NO If yes, where, when, and reason for visit? Ms. Higinio Bolaños has a reminder for a \"due or due soon\" health maintenance.  I have asked that she contact her primary care provider for follow-up on this health maintenance.

## 2019-01-21 NOTE — PATIENT INSTRUCTIONS

## 2019-01-22 ENCOUNTER — TELEPHONE (OUTPATIENT)
Dept: PAIN MANAGEMENT | Age: 69
End: 2019-01-22

## 2019-01-22 NOTE — TELEPHONE ENCOUNTER
Gabapentin was denied by [employer]. The diagnosis listed on the request form are: postherpetic neuralgia (pnh), adjunctive therapy for partial onset seizures, neuropathic pain from chemotherapy, diabetic peripheral neuropathy (dpn), fibromyalgia, cocaine dependence, dialysis associated with pruritis, hot sweats, prophyhylaxis of migraine, post operative pain-preemptive, or spinal muscular atrophy. No documentation of any of these diagnosis. Even though the above dx are listed the medication may not be approved for all of them - as FDA approval is for post-herpetic neuralgia and partial onset seizures. Actual accepted dx listed on denial letter are diabetic peripheral neuropathy, post herpetic neuralgia, seizures and fibromyalgia.

## 2019-01-29 RX ORDER — CARVEDILOL 12.5 MG/1
TABLET ORAL
Qty: 180 TAB | Refills: 0 | Status: SHIPPED | OUTPATIENT
Start: 2019-01-29 | End: 2019-07-30 | Stop reason: SDUPTHER

## 2019-02-02 RX ORDER — AMLODIPINE BESYLATE 5 MG/1
TABLET ORAL
Qty: 90 TAB | Refills: 0 | Status: SHIPPED | OUTPATIENT
Start: 2019-02-02 | End: 2019-05-07 | Stop reason: SDUPTHER

## 2019-02-02 RX ORDER — AMLODIPINE BESYLATE 5 MG/1
TABLET ORAL
Qty: 90 TAB | Refills: 0 | Status: SHIPPED | OUTPATIENT
Start: 2019-02-02 | End: 2019-02-21

## 2019-02-21 ENCOUNTER — OFFICE VISIT (OUTPATIENT)
Dept: PAIN MANAGEMENT | Age: 69
End: 2019-02-21

## 2019-02-21 VITALS
HEIGHT: 60 IN | BODY MASS INDEX: 27.09 KG/M2 | OXYGEN SATURATION: 95 % | HEART RATE: 61 BPM | WEIGHT: 138 LBS | TEMPERATURE: 97.4 F | RESPIRATION RATE: 14 BRPM | DIASTOLIC BLOOD PRESSURE: 68 MMHG | SYSTOLIC BLOOD PRESSURE: 114 MMHG

## 2019-02-21 DIAGNOSIS — M96.1 POSTLAMINECTOMY SYNDROME, LUMBAR REGION: ICD-10-CM

## 2019-02-21 DIAGNOSIS — G89.4 CHRONIC PAIN SYNDROME: Primary | ICD-10-CM

## 2019-02-21 DIAGNOSIS — M51.37 DEGENERATION OF LUMBAR OR LUMBOSACRAL INTERVERTEBRAL DISC: ICD-10-CM

## 2019-02-21 DIAGNOSIS — M54.2 NECK PAIN: ICD-10-CM

## 2019-02-21 DIAGNOSIS — G89.29 CHRONIC LEFT-SIDED LOW BACK PAIN WITHOUT SCIATICA: ICD-10-CM

## 2019-02-21 DIAGNOSIS — Z79.899 ENCOUNTER FOR LONG-TERM (CURRENT) USE OF HIGH-RISK MEDICATION: ICD-10-CM

## 2019-02-21 DIAGNOSIS — M54.50 CHRONIC LEFT-SIDED LOW BACK PAIN WITHOUT SCIATICA: ICD-10-CM

## 2019-02-21 RX ORDER — OXYCODONE AND ACETAMINOPHEN 10; 325 MG/1; MG/1
1 TABLET ORAL
Qty: 70 TAB | Refills: 0 | Status: SHIPPED | OUTPATIENT
Start: 2019-03-09 | End: 2019-04-19 | Stop reason: SDUPTHER

## 2019-02-21 RX ORDER — GABAPENTIN 100 MG/1
300 CAPSULE ORAL
Qty: 90 CAP | Refills: 0 | Status: SHIPPED | OUTPATIENT
Start: 2019-02-21 | End: 2019-03-23

## 2019-02-21 NOTE — PATIENT INSTRUCTIONS

## 2019-02-21 NOTE — PROGRESS NOTES
Nursing Notes    Patient presents to the office today in follow-up. Patient rates her pain at 8/10 on the numerical pain scale. Reviewed medications with counts as follows:    Rx Date filled Qty Dispensed Pill Count Last Dose Short   Norco 10 mg 02/07/19 80 63.5 This am  no       Last opioid agreement 01/07/19  Last urine drug screen 01/07/19    Comments:  Patient is here today for a follow up appt today for her neck and back pain. She states her pain level today is an 8.5  Patient needs a refill on her Gabapentin also. P  PHQ 2 was done patient is seeing a mental health provider     POC UDS was not performed in office today    Any new labs or imaging since last appointment? YES    Have you been to an emergency room (ER) or urgent care clinic since your last visit? NO            Have you been hospitalized since your last visit? NO     If yes, where, when, and reason for visit? Have you seen or consulted any other health care providers outside of the [address] [place] [address]  since your last visit? NO     If yes, where, when, and reason for visit? Ms. Keysha Dinero has a reminder for a \"due or due soon\" health maintenance. I have asked that she contact her primary care provider for follow-up on this health maintenance.

## 2019-02-25 NOTE — PROGRESS NOTES
Referral date 8/4/2009, source Dr Sid Boyle and for neck and back pain. HPI:  Aiden Juarez is a 77 y.o. female here for f/u visit for ongoing evaluation of back and bilateral hip pain. She is s/p L4/5 fusion. Pt was last seen here on 1/21/19. Pt denies interval changes on the character or distribution of pain. Pain is located along cervical spine described as stabbing, along thoracic spine described as aching and along lumbar spine described as stabbing. Pain is also located to left hip. Pain at its best is 4/10. Pain at its worse is 9/10. The pain is worsened by cold rainy weather, lifting heavy objects, vacuuming, prolonged walking, prolonged sitting and prolonged standing. She states TENS unit use, massage therapy and chiropractor therapy worsened her pain. Symptoms are improved by Tylenol, Percocet, topical Bengay, topical Aspercreme, heat and PT for hip & back about 1 year ago. Pt has tried compound cream, ice and epidural steroid injections with no perceived benefit. Elavil in the past, not sure if it was for pain. Gabapentin \"along time ago\" that helped. She has never tried aquatic PT, acupuncture, yoga, SCS trial, implantable pain pump, Cymbalta, Topamax or Lyrica. She is not interested in Lyrica. Since last visit, she reports relief with Gabapentin at night although it makes her \"a little sleepy\". She still has not got capsaicin cream as recommended. Pt reports having a \"lump\" to the right posterior aspect of her neck; advised patient she needs to have this evaluated by her PCP for further workup. Pt states she recently moved to [place] and is interested in transferring her pain management care to a clinic closer to her new home.        Social History     Socioeconomic History    Marital status:      Spouse name: Not on file    Number of children: Not on file    Years of education: Not on file    Highest education level: Not on file   Social Needs    Financial resource strain: Not on file   Sandee-Armida insecurity - worry: Not on file    Food insecurity - inability: Not on file    Transportation needs - medical: Not on file   [employer] needs - non-medical: Not on file   Occupational History    Not on file   Tobacco Use    Smoking status: Current Every Day Smoker     Packs/day: 0.25     Types: Cigarettes    Smokeless tobacco: Never Used    Tobacco comment: 6 ciggerates per day   Substance and Sexual Activity    Alcohol use: Yes     Comment: socially    Drug use: No     Comment: denies    Sexual activity: No     Partners: Male   Other Topics Concern    Not on file   Social History Narrative    Not on file     Family History   Problem Relation Age of Onset    Cancer Mother         lung-nonsmoker    Diabetes Other     Heart Disease Other      Allergies   Allergen Reactions    Morphine Unknown (comments)     Hallucinations  Pt. Denies allergy       Past Medical History:   Diagnosis Date    Abdominal pain     Benign essential hypertension     Cancer (HCC)     uterine    Depressive disorder     Fatigue     Hypertension     Insomnia     NATHANAEL (obstructive sleep apnea)     does not use cpap machine    Osteoarthritis     Peptic ulcer 2012    Spinal stenosis     with chronic pain     Past Surgical History:   Procedure Laterality Date    HX APPENDECTOMY  1962    HX BACK SURGERY  1986    PHLD; L4-L5    HX GASTRIC BYPASS  2009    HX HYSTERECTOMY  1983    uterine cancer, stage 1    HX KNEE REPLACEMENT  2005    right    HX VISHAL AND BSO       Current Outpatient Medications on File Prior to Visit   Medication Sig    amLODIPine (NORVASC) 5 mg tablet TAKE 1 TABLET BY MOUTH DAILY    carvedilol (COREG) 12.5 mg tablet TAKE 1 TABLET BY MOUTH TWICE DAILY WITH MEALS    omeprazole (PRILOSEC OTC) 20 mg tablet Take 20 mg by mouth daily.  capsaicin (CAPZASIN-HP) 0.1 % topical cream Apply cream to left hip, neck and lower back TID. Avoid use on damaged, broken or irritated skin.  Avoid occlusive dressings or heat while using this medication.  cloNIDine HCl (CATAPRES) 0.1 mg tablet TAKE 1 TABLET BY MOUTH TWICE DAILY    potassium 99 mg tablet Take 99 mg by mouth daily.  dicyclomine (BENTYL) 10 mg capsule Take 10 mg by mouth 4 times daily (before meals and nightly).  ARIPiprazole (ABILIFY) 10 mg tablet Take 1/2 to 1 tablet by mouth daily as directed    naloxone 4 mg/actuation spry 4 mg by Nasal route as needed for up to 2 doses. Indications: OPIOID TOXICITY    l-methylfolate (DEPLIN) 15 mg tablet Take 15 mg by mouth daily.  colestipol (COLESTID) 1 gram tablet Take 1 g by mouth two (2) times a day.  azelastine (ASTELIN) 137 mcg (0.1 %) nasal spray 1 [place] by Both Nostrils route two (2) times a day. Use in each nostril as directed    sertraline (ZOLOFT) 100 mg tablet Take 200 mg by mouth daily. No current facility-administered medications on file prior to visit. ROS:  Reports depression and anxiety. Denies fever, chills, nausea, vomiting, diarrhea, constipation, abdominal pain, chest pain, shortness or breath/trouble breathing, weakness, trouble swallowing, changes in vision, changes in hearing, falls, dizziness, bladder incontinence, bowel incontinence, suicidal ideations, homicidal ideations or alcohol use. Opioid specific risk: depression, obstructive sleep apnea, history of gastric bypass surgery, intermittent alcohol use, insomnia, tobacco use. Vitals:    02/21/19 1254   BP: 114/68   Pulse: 61   Resp: 14   Temp: 97.4 °F (36.3 °C)   SpO2: 95%   Weight: 62.6 kg (138 lb)   Height: 5' (1.524 m)   PainSc:   8   PainLoc: Neck          Physical exam:  AFVSS, no acute distress, normal body habitus. A&OXs 3. Normocephalic, atraumatic. Conjugate gaze, clear sclerae. Speech is clear and appropriate. Mood is appropriate and patient is cooperative. Gait and balance are within functional limits. Non-labored breathing.          Calculated MEQ - 45  Naloxone rescue - yes  Prophylactic bowel program - no  Date of last OCA 1/7/19  Last UDS 1/7/19, consistent Ethyl glucuronide 29,362ng/ml, Ethyl sulfate 6,135ng/ml. Discussed with patient today, she reports drinking half a glass of wine when she went to dinner with her daughter. Discussed with patient she is to not drink any alcohol while on opiate therapy as stated in her yearly OCA. Pt states understanding.  date checked today, findings consistent    Primary Care Physician  Cassandra Burkitt  [address] [facility] [address] [id number]    Today   Last Visit Prior Visit  PGIC - 5 & 6  6 & 6  6 & 4  LYN - 47%  42%  46%  COMM -6   4  6    PHQ -- .  3 most recent PHQ Screens 1/21/2019   PHQ Not Done -   Little interest or pleasure in doing things Not at all   Feeling down, depressed, irritable, or hopeless Not at all   Total Score PHQ 2 0   Trouble falling or staying asleep, or sleeping too much -   Feeling tired or having little energy -   Poor appetite, weight loss, or overeating -   Feeling bad about yourself - or that you are a failure or have let yourself or your family down -   Trouble concentrating on things such as school, work, reading, or watching TV -   Moving or speaking so slowly that other people could have noticed; or the opposite being so fidgety that others notice -   Thoughts of being better off dead, or hurting yourself in some way -   PHQ 9 Score -   How difficult have these problems made it for you to do your work, take care of your home and get along with others -         Assessment/Plan:     ICD-10-CM ICD-9-CM    1. Chronic pain syndrome G89.4 338.4 gabapentin (NEURONTIN) 100 mg capsule      oxyCODONE-acetaminophen (PERCOCET 10)  mg per tablet   2. Neck pain M54.2 723.1    3. Postlaminectomy syndrome, lumbar region M96.1 722.83 gabapentin (NEURONTIN) 100 mg capsule      oxyCODONE-acetaminophen (PERCOCET 10)  mg per tablet   4.  Chronic left-sided low back pain without sciatica M54.5 724.2 gabapentin (NEURONTIN) 100 mg capsule    G89.29 338.29 oxyCODONE-acetaminophen (PERCOCET 10)  mg per tablet   5. Degeneration of lumbar or lumbosacral intervertebral disc M51.37 722.52 gabapentin (NEURONTIN) 100 mg capsule      oxyCODONE-acetaminophen (PERCOCET 10)  mg per tablet   6. Encounter for long-term (current) use of high-risk medication Z79.899 V58.69         Do not recommend long term opioid therapy for this patient at this time for their chronic pain; the risks outweigh the potential benefits. Pt currently taking Percocet 10/325mg up to 3 times a day as needed with a total of 80 tabs to be budgeted over 30 days. Their MME is 39. Today, we will continue the weaning of patients opioid medication with a goal of being opioid free, pending safety and compliance at next office visit in 30 days. Pt instructed to call if they experience any signs of withdrawal (diarrhea, nausea, vomiting, sweating or chills, agitation, itching). Today, patient provided with Percocet 10/325mg up to 3 times a day as needed with a total of 70 tabs to be budgeted over 30 days. Pt instructed to call 5-7 days before they run out of their medications for refill. At this time pt will be provided with Percocet 10/325mg up to 2 times a day as needed with a total of 60 tabs to be used over 30 days, pending safety and compliance. Their new MME will be 30. At next office visit, the plan is to provide patient with Percocet 7.5/325mg up to 2 times a day as needed with a total of 60 tabs to be used over 30 days. Their new MME will be 22.5. If patient has difficulty with the wean or difficulty with cravings we will consider referral to mental health for ongoing assessment and treatment for opioid use disorder. Pt to slowly taker gabapentin QHS by one tablet each week stopping at 300mg QHS. Start topical capsaicin cream as previously recommended. Consider bilateral greater trochanteric bursae injections with Dr Matilda Sahni in the future.   Pt may also benefit from aquatic PT, acupuncture therapy and/or yoga therapy in the future. Discuss smoking cessation at next visit. Follow up ongoing assessment and ongoing development of integrative and comprehensive plan of care for chronic pain. Goals: To establish complementary and integrative plan of care to address chronic pain issues while minimizing pharmaceuticals to maximize patient's function improve quality of life. Education:  Patient again educated on the importance of strict compliance with the opioid care agreement while on opioid therapy. Patient also again educated that they should avoid driving while on chronic opioid therapy. Also advised to avoid alcohol and to avoid benzodiazepines while on opioid therapy. Handouts given regarding opioid safety. Follow-up Disposition:  Return in about 2 months (around 4/21/2019) for 30 min. [address] was used for portions of this report. Unintended errors may occur.

## 2019-03-07 DIAGNOSIS — I10 BENIGN ESSENTIAL HYPERTENSION: ICD-10-CM

## 2019-03-09 RX ORDER — CLONIDINE HYDROCHLORIDE 0.1 MG/1
TABLET ORAL
Qty: 180 TAB | Refills: 0 | Status: SHIPPED | OUTPATIENT
Start: 2019-03-09 | End: 2019-06-27 | Stop reason: SDUPTHER

## 2019-04-19 ENCOUNTER — OFFICE VISIT (OUTPATIENT)
Dept: PAIN MANAGEMENT | Age: 69
End: 2019-04-19

## 2019-04-19 VITALS
DIASTOLIC BLOOD PRESSURE: 60 MMHG | OXYGEN SATURATION: 96 % | RESPIRATION RATE: 14 BRPM | HEIGHT: 60 IN | HEART RATE: 64 BPM | TEMPERATURE: 98 F | WEIGHT: 143.5 LBS | BODY MASS INDEX: 28.17 KG/M2 | SYSTOLIC BLOOD PRESSURE: 119 MMHG

## 2019-04-19 DIAGNOSIS — G89.29 CHRONIC HIP PAIN, BILATERAL: ICD-10-CM

## 2019-04-19 DIAGNOSIS — M54.50 CHRONIC LEFT-SIDED LOW BACK PAIN WITHOUT SCIATICA: ICD-10-CM

## 2019-04-19 DIAGNOSIS — G89.29 CHRONIC LEFT-SIDED LOW BACK PAIN WITHOUT SCIATICA: ICD-10-CM

## 2019-04-19 DIAGNOSIS — Z79.899 ENCOUNTER FOR LONG-TERM (CURRENT) USE OF HIGH-RISK MEDICATION: ICD-10-CM

## 2019-04-19 DIAGNOSIS — M25.552 CHRONIC HIP PAIN, BILATERAL: ICD-10-CM

## 2019-04-19 DIAGNOSIS — M96.1 POSTLAMINECTOMY SYNDROME, LUMBAR REGION: ICD-10-CM

## 2019-04-19 DIAGNOSIS — M25.551 CHRONIC HIP PAIN, BILATERAL: ICD-10-CM

## 2019-04-19 DIAGNOSIS — G89.4 CHRONIC PAIN SYNDROME: Primary | ICD-10-CM

## 2019-04-19 DIAGNOSIS — M54.2 NECK PAIN: ICD-10-CM

## 2019-04-19 DIAGNOSIS — M51.37 DEGENERATION OF LUMBAR OR LUMBOSACRAL INTERVERTEBRAL DISC: ICD-10-CM

## 2019-04-19 RX ORDER — OXYCODONE AND ACETAMINOPHEN 10; 325 MG/1; MG/1
1 TABLET ORAL
Qty: 70 TAB | Refills: 0 | Status: SHIPPED | OUTPATIENT
Start: 2019-04-19 | End: 2019-05-20 | Stop reason: SDUPTHER

## 2019-04-19 RX ORDER — PREGABALIN 25 MG/1
25 CAPSULE ORAL 2 TIMES DAILY
Qty: 60 CAP | Refills: 1 | Status: SHIPPED | OUTPATIENT
Start: 2019-04-19 | End: 2019-06-18

## 2019-04-19 NOTE — PROGRESS NOTES
Nursing Notes Patient presents to the office today in follow-up. Patient rates her pain at 8/10 on the numerical pain scale. Reviewed medications with counts as follows:   
Rx Date filled Qty Dispensed Pill Count Last Dose Short Percocet 10/325 mg tab 3/14/19 70 2.5 tabs today no  
       
       
       
          
 reviewed YES Any aberrancies noted on  NO Last opioid agreement 1/7/19 Last urine drug screen 1/7/19 Comments: POC UDS was not performed in office today Any new labs or imaging since last appointment? NO Have you been to an emergency room (ER) or urgent care clinic since your last visit? NO Have you been hospitalized since your last visit? NO If yes, where, when, and reason for visit? Have you seen or consulted any other health care providers outside of the [facility]  since your last visit? NO If yes, where, when, and reason for visit? Ms. Katey Betancourt has a reminder for a \"due or due soon\" health maintenance. I have asked that she contact her primary care provider for follow-up on this health maintenance. 3 most recent PHQ Screens 4/19/2019 PHQ Not Done (No Data) Little interest or pleasure in doing things Not at all Feeling down, depressed, irritable, or hopeless Not at all Total Score PHQ 2 0 Trouble falling or staying asleep, or sleeping too much - Feeling tired or having little energy - Poor appetite, weight loss, or overeating - Feeling bad about yourself - or that you are a failure or have let yourself or your family down - Trouble concentrating on things such as school, work, reading, or watching TV - Moving or speaking so slowly that other people could have noticed; or the opposite being so fidgety that others notice - Thoughts of being better off dead, or hurting yourself in some way -  
PHQ 9 Score - How difficult have these problems made it for you to do your work, take care of your home and get along with others -

## 2019-04-19 NOTE — PROGRESS NOTES
Referral date 8/4/2009, source Dr Mundo Varma and for neck and back pain. HPI: 
Chapis Rowland is a 77 y.o. female here for f/u visit for ongoing evaluation of back and bilateral hip pain. She is s/p L4/5 fusion. Pt was last seen here on 2/21/19. Pt denies interval changes on the character or distribution of pain. Pain is located along cervical spine described as stabbing, along thoracic spine described as aching and along lumbar spine described as stabbing. Pain is also located to left hip. Pain at its best is 3/10. Pain at its worse is 9/10. The pain is worsened by cold rainy weather, lifting heavy objects, vacuuming, prolonged walking, prolonged sitting and prolonged standing. She states TENS unit use, massage therapy and chiropractor therapy worsened her pain. Symptoms are improved by Tylenol, Percocet, topical Bengay, topical Aspercreme, heat and PT for hip & back about 1 year ago. Pt has tried compound cream, ice and epidural steroid injections with no perceived benefit. Elavil in the past, not sure if it was for pain. Gabapentin \"along time ago\" that helped. She has never tried aquatic PT, acupuncture, yoga, SCS trial, implantable pain pump, Cymbalta, Topamax or Lyrica. Since last visit, she reports relief with Gabapentin at night although it makes her \"a little sleepy\"; she states she was able to get it filled once but after that was told it was not covered by her insurance and therefore stopped taking this medication. She still has not got capsaicin cream as recommended as this was too expensive for her. Social History Socioeconomic History  Marital status:  Spouse name: Not on file  Number of children: Not on file  Years of education: Not on file  Highest education level: Not on file Occupational History  Not on file Social Needs  Financial resource strain: Not on file  Food insecurity:  
  Worry: Not on file Inability: Not on file  Transportation needs:  
  Medical: Not on file Non-medical: Not on file Tobacco Use  Smoking status: Current Every Day Smoker Packs/day: 0.25 Types: Cigarettes  Smokeless tobacco: Never Used  Tobacco comment: 6 ciggerates per day Substance and Sexual Activity  Alcohol use: Yes Comment: socially  Drug use: No  
  Types: Marijuana Comment: denies  Sexual activity: Never Partners: Male Lifestyle  Physical activity:  
  Days per week: Not on file Minutes per session: Not on file  Stress: Not on file Relationships  Social connections:  
  Talks on phone: Not on file Gets together: Not on file Attends [employer] service: Not on file Active member of club or organization: Not on file Attends meetings of clubs or organizations: Not on file Relationship status: Not on file  Intimate partner violence:  
  Fear of current or ex partner: Not on file Emotionally abused: Not on file Physically abused: Not on file Forced sexual activity: Not on file Other Topics Concern  Not on file Social History Narrative  Not on file Family History Problem Relation Age of Onset  Cancer Mother   
     lung-nonsmoker  Diabetes Other  Heart Disease Other Allergies Allergen Reactions  Morphine Unknown (comments) Hallucinations Pt. Denies allergy Past Medical History:  
Diagnosis Date  Abdominal pain  Benign essential hypertension  Cancer ([facility] [address] uterine  Depressive disorder  Fatigue  Hypertension  Insomnia  NATHANAEL (obstructive sleep apnea) does not use cpap machine  Osteoarthritis  Peptic ulcer 2012  Spinal stenosis   
 with chronic pain Past Surgical History:  
Procedure Laterality Date DANNI MAURERD; L4-L5  HX GASTRIC BYPASS  2009  HX HYSTERECTOMY  1983  
 uterine cancer, stage 1  HX KNEE REPLACEMENT  2005  
 right  HX VISHAL AND BSO Current Outpatient Medications on File Prior to Visit Medication Sig  cloNIDine HCl (CATAPRES) 0.1 mg tablet TAKE 1 TABLET BY MOUTH TWICE DAILY  amLODIPine (NORVASC) 5 mg tablet TAKE 1 TABLET BY MOUTH DAILY  carvedilol (COREG) 12.5 mg tablet TAKE 1 TABLET BY MOUTH TWICE DAILY WITH MEALS  omeprazole (PRILOSEC OTC) 20 mg tablet Take 20 mg by mouth daily.  dicyclomine (BENTYL) 10 mg capsule Take 10 mg by mouth 4 times daily (before meals and nightly).  ARIPiprazole (ABILIFY) 10 mg tablet Take 1/2 to 1 tablet by mouth daily as directed  l-methylfolate (DEPLIN) 15 mg tablet Take 15 mg by mouth daily.  colestipol (COLESTID) 1 gram tablet Take 1 g by mouth two (2) times a day.  sertraline (ZOLOFT) 100 mg tablet Take 200 mg by mouth daily.  capsaicin (CAPZASIN-HP) 0.1 % topical cream Apply cream to left hip, neck and lower back TID. Avoid use on damaged, broken or irritated skin. Avoid occlusive dressings or heat while using this medication.  potassium 99 mg tablet Take 99 mg by mouth daily.  naloxone 4 mg/actuation spry 4 mg by Nasal route as needed for up to 2 doses. Indications: OPIOID TOXICITY  azelastine (ASTELIN) 137 mcg (0.1 %) nasal spray 1 [place] by Both Nostrils route two (2) times a day. Use in each nostril as directed No current facility-administered medications on file prior to visit. ROS: 
Reports weakness and bladder incontinence (not new, has been present for several years) Denies fever, chills, nausea, vomiting, diarrhea, constipation, chest pain, shortness or breath/trouble breathing, abdominal pain, trouble swallowing, changes in vision, changes in hearing, falls, dizziness, bowel incontinence, depression, anxiety, suicidal ideations, homicidal ideations or alcohol use.  
 
 
Opioid specific risk: depression, obstructive sleep apnea, history of gastric bypass surgery, intermittent alcohol use, insomnia, tobacco use. Vitals:  
 04/19/19 1041 BP: 119/60 Pulse: 64 Resp: 14 Temp: 98 °F (36.7 °C) TempSrc: Oral  
SpO2: 96% Weight: 65.1 kg (143 lb 8 oz) Height: 5' (1.524 m) PainSc:   8 PainLoc: Generalized Physical exam: 
AFVSS, no acute distress, normal body habitus. A&OXs 3. Normocephalic, atraumatic. Conjugate gaze, clear sclerae. Speech is clear and appropriate. Mood is appropriate and patient is cooperative. Gait and balance are within functional limits. Non-labored breathing. TTP b/l greater trochanteric bursea and b/l SIJ. Calculated MEQ - 45 Naloxone rescue - yes Prophylactic bowel program - no Date of last OCA 1/7/19 Last UDS 1/7/19, consistent Ethyl glucuronide 29,362ng/ml, Ethyl sulfate 6,135ng/ml.  date checked today, findings consistent Primary Care Physician Gelacio Guevara 
[address] [place] [address] 
[phone] Today   Last Visit Prior Visit(s) PGIC - 1 & 7  5 & 6  6 & 6  6 & 4 
LYN - 60%  47%  42%  46% COMM -6  6   4  6 PHQ -- . 
3 most recent PHQ Screens 4/19/2019 PHQ Not Done (No Data) Little interest or pleasure in doing things Not at all Feeling down, depressed, irritable, or hopeless Not at all Total Score PHQ 2 0 Trouble falling or staying asleep, or sleeping too much - Feeling tired or having little energy - Poor appetite, weight loss, or overeating - Feeling bad about yourself - or that you are a failure or have let yourself or your family down - Trouble concentrating on things such as school, work, reading, or watching TV - Moving or speaking so slowly that other people could have noticed; or the opposite being so fidgety that others notice - Thoughts of being better off dead, or hurting yourself in some way -  
PHQ 9 Score - How difficult have these problems made it for you to do your work, take care of your home and get along with others - Assessment/Plan: ICD-10-CM ICD-9-CM 1. Chronic pain syndrome G89.4 338. 4 pregabalin (LYRICA) 25 mg capsule  
   oxyCODONE-acetaminophen (PERCOCET 10)  mg per tablet 2. Neck pain M54.2 723.1 pregabalin (LYRICA) 25 mg capsule 3. Postlaminectomy syndrome, lumbar region M96.1 722.83 pregabalin (LYRICA) 25 mg capsule  
   oxyCODONE-acetaminophen (PERCOCET 10)  mg per tablet 4. Chronic left-sided low back pain without sciatica M54.5 724.2 pregabalin (LYRICA) 25 mg capsule G89.29 338.29 oxyCODONE-acetaminophen (PERCOCET 10)  mg per tablet 5. Degeneration of lumbar or lumbosacral intervertebral disc M51.37 722.52 pregabalin (LYRICA) 25 mg capsule  
   oxyCODONE-acetaminophen (PERCOCET 10)  mg per tablet 6. Chronic hip pain, bilateral M25.551 719.45 pregabalin (LYRICA) 25 mg capsule M25.552 338.29 G89.29    
7. Encounter for long-term (current) use of high-risk medication Z79.899 V58.69 Do not recommend long term opioid therapy for this patient at this time for their chronic pain; the risks outweigh the potential benefits. Pt currently taking Percocet 10/325mg up to 3 times a day as needed with a total of 70 tabs to be budgeted over 30 days. Their MME is 39. Today, we will pause the weaning of patients opioid medication. End goal of being opioid free, pending safety and compliance. Pt instructed to call if they experience any signs of withdrawal (diarrhea, nausea, vomiting, sweating or chills, agitation, itching). Pt instructed to call 5-7 days before they run out of their medications for refill. At this time pt will be provided with Percocet 10/325mg up to 2 times a day as needed with a total of 70 tabs to be used over 30 days, pending safety and compliance. Their new MME will be 30.  
 
At next office visit, the plan is to provide patient with Percocet 7.5/325mg up to 2 times a day as needed with a total of 60 tabs to be used over 30 days. Their new MME will be 22.5. If patient has difficulty with the wean or difficulty with cravings we will consider referral to mental health for ongoing assessment and treatment for opioid use disorder. Gabapentin not covered by her insurance. Will order trial of Lyrica and see if this is covered. Unable to afford capsaicin cream. 
Consider bilateral greater trochanteric bursae injections with Dr Shruthi Abreu in the future; she will schedule first available consult with him regarding this and b/l SIJ injections. Pt may also benefit from aquatic PT, acupuncture therapy and/or yoga therapy in the future. Smoking cessation discussed; advised her to contact her PCP for aggressive smoking cessation techniques. Pt reports other new painful areas including left knee, left shoulder, neck and right hand. Discussed with her that any new pain or chronic pain that has changed character or distribution needs full workup by PCP, orthopedics, urgent care or ED; pt states understanding and plans to contact her PCP. Follow up ongoing assessment and ongoing development of integrative and comprehensive plan of care for chronic pain. Goals: To establish complementary and integrative plan of care to address chronic pain issues while minimizing pharmaceuticals to maximize patient's function improve quality of life. Education: 
Patient again educated on the importance of strict compliance with the opioid care agreement while on opioid therapy. Patient also again educated that they should avoid driving while on chronic opioid therapy. Also advised to avoid alcohol and to avoid benzodiazepines while on opioid therapy. Handouts given regarding opioid safety. Follow-up and Dispositions · Return in about 3 months (around 7/19/2019) for 30 min and first available with Dr Shruthi Abreu for bilateral greater troch injection consult. [address] was used for portions of this report. Unintended errors may occur.

## 2019-04-19 NOTE — PATIENT INSTRUCTIONS

## 2019-05-07 RX ORDER — AMLODIPINE BESYLATE 5 MG/1
TABLET ORAL
Qty: 90 TAB | Refills: 0 | Status: SHIPPED | OUTPATIENT
Start: 2019-05-07

## 2019-05-20 DIAGNOSIS — G89.29 CHRONIC LEFT-SIDED LOW BACK PAIN WITHOUT SCIATICA: ICD-10-CM

## 2019-05-20 DIAGNOSIS — M51.37 DEGENERATION OF LUMBAR OR LUMBOSACRAL INTERVERTEBRAL DISC: ICD-10-CM

## 2019-05-20 DIAGNOSIS — G89.4 CHRONIC PAIN SYNDROME: ICD-10-CM

## 2019-05-20 DIAGNOSIS — M54.50 CHRONIC LEFT-SIDED LOW BACK PAIN WITHOUT SCIATICA: ICD-10-CM

## 2019-05-20 DIAGNOSIS — M96.1 POSTLAMINECTOMY SYNDROME, LUMBAR REGION: ICD-10-CM

## 2019-05-20 RX ORDER — OXYCODONE AND ACETAMINOPHEN 10; 325 MG/1; MG/1
1 TABLET ORAL
Qty: 70 TAB | Refills: 0 | Status: SHIPPED | OUTPATIENT
Start: 2019-05-20 | End: 2019-06-19

## 2019-05-20 NOTE — TELEPHONE ENCOUNTER
[facility] has called requesting a refill of their controlled medication, Percocet 10/315, for the management of chronic pain. Last office visit date: 4/19/19 with [facility] and has a f/u appt on 7/18/19 with them. Last opioid care agreement 1/7/19  Last UDS was done 1/7/19    Date last  was pulled and reviewed : 5/20/19  Last fill date for medication was 4/20/19    Was the patient compliant when the above report was pulled? yes    Analgesia: Patient reports 80% pain relief on current regimen. Aberrancies: No aberrancies noted in the last 30 days. ADL's: Patient states they are able to perform ADL's on current regimen. Adverse Reaction: Patient reports no adverse reactions at this time. Provider's last note and plan of care reviewed? yes  Request forwarded to provider for review.

## 2019-05-30 ENCOUNTER — OFFICE VISIT (OUTPATIENT)
Dept: PAIN MANAGEMENT | Age: 69
End: 2019-05-30

## 2019-05-30 VITALS
TEMPERATURE: 97.2 F | RESPIRATION RATE: 12 BRPM | HEART RATE: 64 BPM | SYSTOLIC BLOOD PRESSURE: 151 MMHG | OXYGEN SATURATION: 95 % | DIASTOLIC BLOOD PRESSURE: 77 MMHG | HEIGHT: 60 IN | WEIGHT: 143 LBS | BODY MASS INDEX: 28.07 KG/M2

## 2019-05-30 DIAGNOSIS — M70.62 TROCHANTERIC BURSITIS OF LEFT HIP: Primary | ICD-10-CM

## 2019-05-30 NOTE — PROGRESS NOTES
Referred 2009, source Dr Radha Martinez and for neck and back pain. Pt was last seen here on  4/19/2019     GIC-6 and 7  YLN -56%  COMM- 9     HPI:  Otoniel Peters  Is a 68 y.o. female here for f/u visit for consideration of bilateral greater trochanteric bursa injections. Pt denies interval changes in the character or distribution of lateral hip pain. The hip pain began with acute onset and unknown mechanism about 2 years ago. Pain is located at lateral aspect of bilateral hips the left worse than the right. The pain is described as sharp and ranges from 5 to 8/10. Her symptoms are worse with side-lying on the lateral hip, up and down stairs, walking, and transitioning to and from sit to stand. The symptoms also interfere with her sleep. ROS:Review of systems is negative for fever, chills, nausea, vomiting, diarrhea, constipation, chest pain, shortness of breath, abdominal pain, focal weakness, trouble swallowing, acute changes in vision, acute changes in hearing, dizziness, falls, depression, anxiety, suicidal ideation, homicidal ideation, alcohol use. Review of systems positive for  bilateral hip pain and lower back pain. Visit Vitals  /77 (BP 1 Location: Right arm, BP Patient Position: Sitting)   Pulse 64   Temp 97.2 °F (36.2 °C) (Oral)   Resp 12   Ht 5' (1.524 m)   Wt 64.9 kg (143 lb)   SpO2 95%   BMI 27.93 kg/m²        PE:  AFVSS, no acute distress, normal body habitus. A&OXs 3. Speech is clear and appropriate. Mood is pleasant and appropriate. Patient is cooperative. Breathing is nonlabored. Conjugate gaze. Tenderness to palpation at bilateral greater trochanters. Gait is within functional limits with antalgia. Balance is within functional limits.         Primary Care Physician  Homero Faria MD  [address] [place] [phone]  [phone]        [place] [id number] -- .  3 most recent [place] [address] Screens 5/30/2019   PHQ Not Done -   Little interest or pleasure in doing things Several days Feeling down, depressed, irritable, or hopeless Nearly every day   Total Score PHQ 2 4   Trouble falling or staying asleep, or sleeping too much Not at all   Feeling tired or having little energy More than half the days   Poor appetite, weight loss, or overeating Several days   Feeling bad about yourself - or that you are a failure or have let yourself or your family down Not at all   Trouble concentrating on things such as school, work, reading, or watching TV Several days   Moving or speaking so slowly that other people could have noticed; or the opposite being so fidgety that others notice Several days   Thoughts of being better off dead, or hurting yourself in some way Several days   PHQ 9 Score 10   How difficult have these problems made it for you to do your work, take care of your home and get along with others Somewhat difficult            Assessment/Plan:   Encounter Diagnoses     ICD-10-CM ICD-9-CM   1. Trochanteric bursitis of left hip M70.62 726.5     Patient with signs and symptoms consistent with bilateral greater trochanteric bursitis with the left far worse than the right. --Patient given educational handouts on bilateral piriformis stretching and bilateral iliotibial band stretching and advised to incorporate this into her daily routine. This alone will hopefully help to decrease the pain at the lateral hips, and improve her gait pattern.  We will have the patient return for next available office procedure for a left-sided greater trochanteric bursa injection. --She is advised to pretreat with Tylenol and NSAID and heat prior to exacerbating activities and follow exacerbating activities with relative rest and ice. --Follow-up   as scheduled with Samantha    GOALS:  To establish complementary and integrative plan of care to address chronic pain issues while minimizing pharmaceuticals to maximize patient's function improve quality of life.      A total of 24 minutes were spent with the patient, of which more than half of the time was spent counseling and/or coordinating care.

## 2019-05-30 NOTE — PROGRESS NOTES
Nursing Notes    Patient presents to the office today for an injection consult with Dr. Phoebe Castrejon for her chronic back and hip pain. Patient rates her pain at 9/10 on the numerical pain scale.  reviewed YES  Any aberrancies noted on  NO  Last opioid agreement 01/07/19  Last urine drug screen 01/07/19    Comments:     POC UDS was not performed in office today    Any new labs or imaging since last appointment? NO    Have you been to an emergency room (ER) or urgent care clinic since your last visit? NO         Have you been hospitalized since your last visit? NO     If yes, where, when, and reason for visit? Have you seen or consulted any other health care providers outside of the [address] [place] [address]  since your last visit? NO     If yes, where, when, and reason for visit? Ms. Damon Pressarmad has a reminder for a \"due or due soon\" health maintenance. I have asked that she contact her primary care provider for follow-up on this health maintenance.     3 most recent PHQ Screens 5/30/2019   PHQ Not Done -   Little interest or pleasure in doing things Several days   Feeling down, depressed, irritable, or hopeless Nearly every day   Total Score PHQ 2 4   Trouble falling or staying asleep, or sleeping too much Not at all   Feeling tired or having little energy More than half the days   Poor appetite, weight loss, or overeating Several days   Feeling bad about yourself - or that you are a failure or have let yourself or your family down Not at all   Trouble concentrating on things such as school, work, reading, or watching TV Several days   Moving or speaking so slowly that other people could have noticed; or the opposite being so fidgety that others notice Several days   Thoughts of being better off dead, or hurting yourself in some way Several days   PHQ 9 Score 10   How difficult have these problems made it for you to do your work, take care of your home and get along with others Somewhat difficult Pt states that she is being treated by her psychiatrist for her depression. Provider made aware of the above score.

## 2019-05-30 NOTE — PATIENT INSTRUCTIONS
Learning About Benefits From Quitting Smoking How does quitting smoking make you healthier? If you're thinking about quitting smoking, you may have a few reasons to be smoke-free. Your health may be one of them. · When you quit smoking, you lower your risks for cancer, lung disease, heart attack, stroke, blood vessel disease, and blindness from macular degeneration. · When you're smoke-free, you get sick less often, and you heal faster. You are less likely to get colds, flu, bronchitis, and pneumonia. · As a nonsmoker, you may find that your mood is better and you are less stressed. When and how will you feel healthier? Quitting has real health benefits that start from day 1 of being smoke-free. And the longer you stay smoke-free, the healthier you get and the better you feel. The first hours · After just 20 minutes, your blood pressure and heart rate go down. That means there's less stress on your heart and blood vessels. · Within 12 hours, the level of carbon monoxide in your blood drops back to normal. That makes room for more oxygen. With more oxygen in your body, you may notice that you have more energy than when you smoked. After 2 weeks · Your lungs start to work better. · Your risk of heart attack starts to drop. After 1 month · When your lungs are clear, you cough less and breathe deeper, so it's easier to be active. · Your sense of taste and smell return. That means you can enjoy food more than you have since you started smoking. Over the years · After 1 year, your risk of heart disease is half what it would be if you kept smoking. · After 5 years, your risk of stroke starts to shrink. Within a few years after that, it's about the same as if you'd never smoked. · After 10 years, your risk of dying from lung cancer is cut by about half. And your risk for many other types of cancer is lower too. How would quitting help others in your life? When you quit smoking, you improve the health of everyone who now breathes in your smoke. · Their heart, lung, and cancer risks drop, much like yours. · They are sick less. For babies and small children, living smoke-free means they're less likely to have ear infections, pneumonia, and bronchitis. · If you're a woman who is or will be pregnant someday, quitting smoking means a healthier . · Children who are close to you are less likely to become adult smokers. Where can you learn more? Go to http://regan-caitlin.info/. Enter [id number] [id number] in the search box to learn more about \"Learning About Benefits From Quitting Smoking. \" Current as of: 2018 Content Version: 11.9 © 1524-6865 [employer]. Care instructions adapted under license by [employer] (which disclaims liability or warranty for this information). If you have questions about a medical condition or this instruction, always ask your healthcare professional. [place] [address] any warranty or liability for your use of this information. Piriformis Syndrome: Exercises Your Care Instructions Here are some examples of typical rehabilitation exercises for your condition. Start each exercise slowly. Ease off the exercise if you start to have pain. Your doctor or physical therapist will tell you when you can start these exercises and which ones will work best for you. How to do the exercises Hip rotator stretch 1. Lie on your back with both knees bent and your feet flat on the floor. 2. Put the ankle of your affected leg on your opposite thigh near your knee. 3. Use your hand to gently push your knee (on your affected leg) away from your body until you feel a gentle stretch around your hip. 4. Hold the stretch for 15 to 30 seconds. 5. Repeat 2 to 4 times. 6. Switch legs and repeat steps 1 through 5. Piriformis stretch 1. Lie on your back with your legs straight. 2. Lift your affected leg and bend your knee. With your opposite hand, reach across your body, and then gently pull your knee toward your opposite shoulder. 3. Hold the stretch for 15 to 30 seconds. 4. Repeat with your other leg. 5. Repeat 2 to 4 times on each side. Lower abdominal strengthening 1. Lie on your back with your knees bent and your feet flat on the floor. 2. Tighten your belly muscles by pulling your belly button in toward your spine. 3. Lift one foot off the floor and bring your knee toward your chest, so that your knee is straight above your hip and your leg is bent like the letter \"L. \" 
4. Lift the other knee up to the same position. 5. Lower one leg at a time to the starting position. 6. Keep alternating legs until you have lifted each leg 8 to 12 times. 7. Be sure to keep your belly muscles tight and your back still as you are moving your legs. Be sure to breathe normally. Follow-up care is a key part of your treatment and safety. Be sure to make and go to all appointments, and call your doctor if you are having problems. It's also a good idea to know your test results and keep a list of the medicines you take. Current as of: September 20, 2018 Content Version: 11.9 © 4745-0904 [employer]. Care instructions adapted under license by [employer] (which disclaims liability or warranty for this information). If you have questions about a medical condition or this instruction, always ask your healthcare professional. [place] [address] any warranty or liability for your use of this information. Iliotibial Band Syndrome: Exercises Your Care Instructions Here are some examples of typical rehabilitation exercises for your condition. Start each exercise slowly. Ease off the exercise if you start to have pain. Your doctor or physical therapist will tell you when you can start these exercises and which ones will work best for you. How to do the exercises Iliotibial band stretch 1. Lean sideways against a wall. If you are not steady on your feet, hold on to a chair or counter. 2. Stand on the leg with the affected hip, with that leg close to the wall. Then cross your other leg in front of it. 3. Let your affected hip drop out to the side of your body and against the wall. Then lean away from your affected hip until you feel a stretch. 4. Hold the stretch for 15 to 30 seconds. 5. Repeat 2 to 4 times. Piriformis stretch 1. Lie on your back with your legs straight. 2. Lift your affected leg and bend your knee. With your opposite hand, reach across your body, and then gently pull your knee toward your opposite shoulder. 3. Hold the stretch for 15 to 30 seconds. 4. Repeat 2 to 4 times. Hamstring wall stretch 1. Lie on your back in a doorway, with your good leg through the open door. 2. Slide your affected leg up the wall to straighten your knee. You should feel a gentle stretch down the back of your leg. 1. Do not arch your back. 2. Do not bend either knee. 3. Keep one heel touching the floor and the other heel touching the wall. Do not point your toes. 3. Hold the stretch for at least 1 minute to begin. Then try to lengthen the time you hold the stretch to as long as 6 minutes. 4. Repeat 2 to 4 times. 5. If you do not have a place to do this exercise in a doorway, there is another way to do it: 6. Lie on your back, and bend the knee of your affected leg. 7. Loop a towel under the ball and toes of that foot, and hold the ends of the towel in your hands. 8. Straighten your knee, and slowly pull back on the towel. You should feel a gentle stretch down the back of your leg. 9. Hold the stretch for 15 to 30 seconds. Or even better, hold the stretch for 1 minute if you can. 10. Repeat 2 to 4 times. Follow-up care is a key part of your treatment and safety.  Be sure to make and go to all appointments, and call your doctor if you are having problems. It's also a good idea to know your test results and keep a list of the medicines you take. Where can you learn more? Go to http://regan-caitlin.info/. Enter P252 in the search box to learn more about \"Iliotibial Band Syndrome: Exercises. \" Current as of: September 20, 2018 Content Version: 11.9 © 8406-5464 [employer]. Care instructions adapted under license by [employer] (which disclaims liability or warranty for this information). If you have questions about a medical condition or this instruction, always ask your healthcare professional. [address] any warranty or liability for your use of this information.

## 2019-06-25 DIAGNOSIS — M25.552 CHRONIC HIP PAIN, BILATERAL: ICD-10-CM

## 2019-06-25 DIAGNOSIS — M51.37 DEGENERATION OF LUMBAR OR LUMBOSACRAL INTERVERTEBRAL DISC: ICD-10-CM

## 2019-06-25 DIAGNOSIS — M25.551 CHRONIC HIP PAIN, BILATERAL: ICD-10-CM

## 2019-06-25 DIAGNOSIS — M96.1 POSTLAMINECTOMY SYNDROME, LUMBAR REGION: ICD-10-CM

## 2019-06-25 DIAGNOSIS — G89.29 CHRONIC HIP PAIN, BILATERAL: ICD-10-CM

## 2019-06-25 DIAGNOSIS — G89.4 CHRONIC PAIN SYNDROME: ICD-10-CM

## 2019-06-25 DIAGNOSIS — M54.50 CHRONIC LEFT-SIDED LOW BACK PAIN WITHOUT SCIATICA: ICD-10-CM

## 2019-06-25 DIAGNOSIS — M70.62 TROCHANTERIC BURSITIS OF LEFT HIP: Primary | ICD-10-CM

## 2019-06-25 DIAGNOSIS — G89.29 CHRONIC LEFT-SIDED LOW BACK PAIN WITHOUT SCIATICA: ICD-10-CM

## 2019-06-25 DIAGNOSIS — M54.2 NECK PAIN: ICD-10-CM

## 2019-06-25 RX ORDER — OXYCODONE AND ACETAMINOPHEN 10; 325 MG/1; MG/1
1 TABLET ORAL
Qty: 70 TAB | Refills: 0 | Status: SHIPPED | OUTPATIENT
Start: 2019-06-29 | End: 2019-07-19 | Stop reason: SDUPTHER

## 2019-06-25 NOTE — TELEPHONE ENCOUNTER
Edwin Gutierres has called requesting a refill of their controlled medication, percocet, for the management of her chronic back pain. Last office visit date: 04/19/19  Last opioid care agreement 01/07/19  Last UDS was done 01/07/19    Date last  was pulled and reviewed : 06/25/19  Last fill date for medication was 05/30/19 for percocet 10/325 mg #70 tabs     Was the patient compliant when the above report was pulled? yes    Analgesia: pt reports an 80% pain relief with her current opioid medication regimen     Aberrancies: none noted     ADL's: pt reports that she is able to perform her normal daily tasks because she is taking her medication. Adverse Reaction: none reported by the pt at this time. Provider's last note and plan of care reviewed? yes  Request forwarded to provider for review.

## 2019-06-25 NOTE — TELEPHONE ENCOUNTER
I called and spoke to the pt about her refill request. The pt was identified using 2 pt identifiers. She was informed that her prescription was done and is ready for . She verbalized understanding and has no questions at this time.

## 2019-06-26 ENCOUNTER — DOCUMENTATION ONLY (OUTPATIENT)
Dept: FAMILY MEDICINE CLINIC | Facility: CLINIC | Age: 69
End: 2019-06-26

## 2019-06-26 NOTE — PROGRESS NOTES
Pharmacist Note: Immunization Update    Patient: Nayan Barone (25 y.o., 1950)     Patient's immunization history was updated to reflect information contained in the [place] and/or outside immunization/pharmacy records were reconciled within [facility]. Health Maintenance schedule updated when appropriate.     Current immunizations now reflect:       Immunization History   Administered Date(s) Administered    Influenza High Dose Vaccine PF 09/12/2016, 01/03/2018, 10/05/2018    Pneumococcal Conjugate (PCV-13) 09/12/2016       Rose Fraire, Atn, BCACP

## 2019-06-27 ENCOUNTER — OFFICE VISIT (OUTPATIENT)
Dept: FAMILY MEDICINE CLINIC | Facility: CLINIC | Age: 69
End: 2019-06-27

## 2019-06-27 VITALS
DIASTOLIC BLOOD PRESSURE: 58 MMHG | HEART RATE: 57 BPM | OXYGEN SATURATION: 95 % | HEIGHT: 60 IN | WEIGHT: 145.2 LBS | SYSTOLIC BLOOD PRESSURE: 110 MMHG | TEMPERATURE: 97.3 F | BODY MASS INDEX: 28.51 KG/M2 | RESPIRATION RATE: 12 BRPM

## 2019-06-27 DIAGNOSIS — G56.01 CARPAL TUNNEL SYNDROME OF RIGHT WRIST: Primary | ICD-10-CM

## 2019-06-27 DIAGNOSIS — Z11.59 ENCOUNTER FOR HEPATITIS C SCREENING TEST FOR LOW RISK PATIENT: ICD-10-CM

## 2019-06-27 DIAGNOSIS — Z12.39 BREAST CANCER SCREENING: ICD-10-CM

## 2019-06-27 DIAGNOSIS — I10 BENIGN ESSENTIAL HYPERTENSION: ICD-10-CM

## 2019-06-27 RX ORDER — CLONIDINE HYDROCHLORIDE 0.1 MG/1
TABLET ORAL
Qty: 180 TAB | Refills: 0 | Status: SHIPPED | OUTPATIENT
Start: 2019-06-27 | End: 2019-09-28 | Stop reason: SDUPTHER

## 2019-06-27 NOTE — PROGRESS NOTES
Nayan Barone is a 68 y.o. female presenting today for Hand Pain (pt states that she has been experiencing right hand pain and numbness that radiates to her elbow that started years ago and now aggravating left hand N&T)  . HPI:  Nayan Barone presents to the office today for right hand pain. She presents today complaining of right hand numbness and tingling. She notes her thumb, second, third, and fourth digit have pain and numbness and tingling. She notes she was previously tested about 4 5 years ago for carpal tunnel syndrome and was negative. She is using a splint intermittently but notes that it causes increased numbness and tingling to her second, third and fourth digits. Review of Systems   Respiratory: Negative for cough. Cardiovascular: Negative for chest pain and palpitations. Musculoskeletal: Positive for joint pain (right wrist) and myalgias. Allergies   Allergen Reactions    Morphine Unknown (comments)     Hallucinations  Pt. Denies allergy         Current Outpatient Medications   Medication Sig Dispense Refill    cloNIDine HCl (CATAPRES) 0.1 mg tablet TAKE 1 TABLET BY MOUTH TWICE DAILY 180 Tab 0    [START ON 6/29/2019] oxyCODONE-acetaminophen (PERCOCET 10)  mg per tablet Take 1 Tab by mouth two (2) times daily as needed for Pain (70 tabs to be budgeted over 30 days) for up to 30 days. Max Daily Amount: 2 Tabs. 70 Tab 0    amLODIPine (NORVASC) 5 mg tablet TAKE 1 TABLET BY MOUTH DAILY 90 Tab 0    carvedilol (COREG) 12.5 mg tablet TAKE 1 TABLET BY MOUTH TWICE DAILY WITH MEALS 180 Tab 0    omeprazole (PRILOSEC OTC) 20 mg tablet Take 20 mg by mouth daily.  dicyclomine (BENTYL) 10 mg capsule Take 10 mg by mouth 4 times daily (before meals and nightly).  ARIPiprazole (ABILIFY) 10 mg tablet Take 1/2 to 1 tablet by mouth daily as directed      l-methylfolate (DEPLIN) 15 mg tablet Take 15 mg by mouth daily.       colestipol (COLESTID) 1 gram tablet Take 1 g by mouth two (2) times a day.  azelastine (ASTELIN) 137 mcg (0.1 %) nasal spray 1 [place] by Both Nostrils route two (2) times a day. Use in each nostril as directed 1 Bottle 2    sertraline (ZOLOFT) 100 mg tablet Take 200 mg by mouth daily.  naloxone 4 mg/actuation spry 4 mg by Nasal route as needed for up to 2 doses.  Indications: OPIOID TOXICITY 1 Box 1       Past Medical History:   Diagnosis Date    Abdominal pain     Benign essential hypertension     Cancer (HCC)     uterine    Depressive disorder     Fatigue     Hypertension     Insomnia     NATHANAEL (obstructive sleep apnea)     does not use cpap machine    Osteoarthritis     Peptic ulcer 2012    Spinal stenosis     with chronic pain       Past Surgical History:   Procedure Laterality Date    HX APPENDECTOMY  1962    HX BACK SURGERY  1986    PHLD; L4-L5    HX GASTRIC BYPASS  2009    HX HYSTERECTOMY  1983    uterine cancer, stage 1    HX KNEE REPLACEMENT  2005    right    HX VISHAL AND BSO         Social History     Socioeconomic History    Marital status:      Spouse name: Not on file    Number of children: Not on file    Years of education: Not on file    Highest education level: Not on file   Occupational History    Not on file   Social Needs    Financial resource strain: Not on file    Food insecurity:     Worry: Not on file     Inability: Not on file    Transportation needs:     Medical: Not on file     Non-medical: Not on file   Tobacco Use    Smoking status: Current Every Day Smoker     Packs/day: 0.50     Types: Cigarettes    Smokeless tobacco: Never Used   Substance and Sexual Activity    Alcohol use: Yes     Comment: socially    Drug use: No     Comment: denies    Sexual activity: Never     Partners: Male   Lifestyle    Physical activity:     Days per week: Not on file     Minutes per session: Not on file    Stress: Not on file   Relationships    Social connections:     Talks on phone: Not on file     Gets together: Not on file     Attends Jewish service: Not on file     Active member of club or organization: Not on file     Attends meetings of clubs or organizations: Not on file     Relationship status: Not on file    Intimate partner violence:     Fear of current or ex partner: Not on file     Emotionally abused: Not on file     Physically abused: Not on file     Forced sexual activity: Not on file   Other Topics Concern    Not on file   Social History Narrative    Not on file       Patient does not have an advanced directive on file    Vitals:    06/27/19 1029   BP: 110/58   Pulse: (!) 57   Resp: 12   Temp: 97.3 °F (36.3 °C)   TempSrc: Oral   SpO2: 95%   Weight: 145 lb 3.2 oz (65.9 kg)   Height: 5' (1.524 m)   PainSc:   8   PainLoc: Hand       Physical Exam   Constitutional: No distress. Cardiovascular: Normal rate, regular rhythm and normal heart sounds. Pulmonary/Chest: Effort normal and breath sounds normal.   Musculoskeletal: She exhibits tenderness. She exhibits no edema. Hands:  Positive Phalen and Tinel sign   Neurological: She is alert. Nursing note and vitals reviewed. No visits with results within 3 Month(s) from this visit. Latest known visit with results is:   Office Visit on 01/07/2019   Component Date Value Ref Range Status    AMPHETAMINES UR POC 01/07/2019 Negative   Final    COCAINE UR POC 01/07/2019 Negative   Final    METHADONE UR POC 01/07/2019 Negative   Final    OPIATES UR POC 01/07/2019 Negative   Final    OXYCODONE UR POC 01/07/2019 Presumptive Positive   Final    BENZODIAZEPINES UR POC 01/07/2019 Negative   Final    CANNABINOIDS UR POC 01/07/2019 Negative   Final    BUPRENORPHINE UR POC 01/07/2019 Negative   Final       .No results found for any visits on 06/27/19. Assessment / Plan:      ICD-10-CM ICD-9-CM    1. Carpal tunnel syndrome of right wrist G56.01 354.0 REFERRAL TO ORTHOPEDICS   2.  Benign essential hypertension I10 401.1 cloNIDine HCl (CATAPRES) 0.1 mg tablet CBC WITH AUTOMATED DIFF      LIPID PANEL      METABOLIC PANEL, COMPREHENSIVE   3. Breast cancer screening Z12.31 V76.10 SHEA MAMMO BI SCREENING INCL CAD   4. Encounter for hepatitis C screening test for low risk patient Z11.59 V73.89 HEPATITIS C AB     Carpal tunnel syndrome-referral to Dr. Rex Mott, orthopedics  Hypertension-controlled, refill clonidine  Patient needs fasting labs prior to next office visit  Mammogram ordered and hep C screening  Flu tube was 4 routine visit    Follow-up and Dispositions    · Return in about 2 months (around 8/27/2019), or if symptoms worsen or fail to improve. I asked the patient if she  had any questions and answered her  questions. The patient stated that she understands the treatment plan and agrees with the treatment plan    This document was created with a voice activated dictation system and may contain transcription errors. This is the Subsequent Medicare Annual Wellness Exam, performed 12 months or more after the Initial AWV or the last Subsequent AWV    I have reviewed the patient's medical history in detail and updated the computerized patient record.      History     Past Medical History:   Diagnosis Date    Abdominal pain     Benign essential hypertension     Cancer (HCC)     uterine    Depressive disorder     Fatigue     Hypertension     Insomnia     NATHANAEL (obstructive sleep apnea)     does not use cpap machine    Osteoarthritis     Peptic ulcer 2012    Spinal stenosis     with chronic pain      Past Surgical History:   Procedure Laterality Date    HX APPENDECTOMY  1962    HX BACK SURGERY  1986    PHLD; L4-L5    HX GASTRIC BYPASS  2009    HX HYSTERECTOMY  1983    uterine cancer, stage 1    HX KNEE REPLACEMENT  2005    right    HX VISHAL AND BSO       Current Outpatient Medications   Medication Sig Dispense Refill    cloNIDine HCl (CATAPRES) 0.1 mg tablet TAKE 1 TABLET BY MOUTH TWICE DAILY 180 Tab 0    [START ON 6/29/2019] oxyCODONE-acetaminophen (PERCOCET 10)  mg per tablet Take 1 Tab by mouth two (2) times daily as needed for Pain (70 tabs to be budgeted over 30 days) for up to 30 days. Max Daily Amount: 2 Tabs. 70 Tab 0    amLODIPine (NORVASC) 5 mg tablet TAKE 1 TABLET BY MOUTH DAILY 90 Tab 0    carvedilol (COREG) 12.5 mg tablet TAKE 1 TABLET BY MOUTH TWICE DAILY WITH MEALS 180 Tab 0    omeprazole (PRILOSEC OTC) 20 mg tablet Take 20 mg by mouth daily.  dicyclomine (BENTYL) 10 mg capsule Take 10 mg by mouth 4 times daily (before meals and nightly).  ARIPiprazole (ABILIFY) 10 mg tablet Take 1/2 to 1 tablet by mouth daily as directed      l-methylfolate (DEPLIN) 15 mg tablet Take 15 mg by mouth daily.  colestipol (COLESTID) 1 gram tablet Take 1 g by mouth two (2) times a day.  azelastine (ASTELIN) 137 mcg (0.1 %) nasal spray 1 [place] by Both Nostrils route two (2) times a day. Use in each nostril as directed 1 Bottle 2    sertraline (ZOLOFT) 100 mg tablet Take 200 mg by mouth daily.  naloxone 4 mg/actuation spry 4 mg by Nasal route as needed for up to 2 doses. Indications: OPIOID TOXICITY 1 Box 1     Allergies   Allergen Reactions    Morphine Unknown (comments)     Hallucinations  Pt.  Denies allergy       Family History   Problem Relation Age of Onset   Ofelia Cancer Mother         lung-nonsmoker    Diabetes Other     Heart Disease Other      Social History     Tobacco Use    Smoking status: Current Every Day Smoker     Packs/day: 0.50     Types: Cigarettes    Smokeless tobacco: Never Used   Substance Use Topics    Alcohol use: Yes     Comment: socially     Patient Active Problem List   Diagnosis Code    Low back pain M54.5    Postlaminectomy syndrome, lumbar region M96.1    Degeneration of lumbar or lumbosacral intervertebral disc M51.37    Pain in joint, pelvic region and thigh M25.559    Trochanteric bursitis M70.60    Neck pain M54.2    Degeneration of cervical intervertebral disc M50.30  Pain in limb M79.609    Encounter for long-term (current) use of other medications Z79.899    Cervicalgia M54.2    Lumbago M54.5    Enthesopathy of hip region M76.899    Chronic insomnia F51.04    Fatigue R53.83    Abdominal pain R10.9    Depressive disorder F32.9    Insomnia G47.00    Benign essential hypertension I10    Osteoporosis without current pathological fracture M81.0    Age-related osteoporosis without current pathological fracture M81.0    Recurrent depression ([facility] [address] F33.9       Depression Risk Factor Screening:     3 most recent PHQ Screens 5/30/2019   PHQ Not Done -   Little interest or pleasure in doing things Several days   Feeling down, depressed, irritable, or hopeless Nearly every day   Total Score PHQ 2 4   Trouble falling or staying asleep, or sleeping too much Not at all   Feeling tired or having little energy More than half the days   Poor appetite, weight loss, or overeating Several days   Feeling bad about yourself - or that you are a failure or have let yourself or your family down Not at all   Trouble concentrating on things such as school, work, reading, or watching TV Several days   Moving or speaking so slowly that other people could have noticed; or the opposite being so fidgety that others notice Several days   Thoughts of being better off dead, or hurting yourself in some way Several days   PHQ 9 Score 10   How difficult have these problems made it for you to do your work, take care of your home and get along with others Somewhat difficult     Alcohol Risk Factor Screening: You do not drink alcohol or very rarely. Functional Ability and Level of Safety:   Hearing Loss  Hearing is good. Activities of Daily Living  The home contains: no safety equipment. Patient does total self care    Fall Risk  Fall Risk Assessment, last 12 mths 6/27/2019   Able to walk? Yes   Fall in past 12 months? Yes   Fall with injury?  Yes   Number of falls in past 12 months 1   Fall Risk Score 2       Abuse Screen  Patient is not abused    Cognitive Screening   Evaluation of Cognitive Function:  Has your family/caregiver stated any concerns about your memory: no  Normal    Patient Care Team   Patient Care Team:  Chano Gillespie MD as PCP - General (Internal Medicine)  Samantha Escamilla NP    Assessment/Plan   Education and counseling provided:  Are appropriate based on today's review and evaluation  Screening Mammography    Diagnoses and all orders for this visit:    1. Carpal tunnel syndrome of right wrist  -     REFERRAL TO ORTHOPEDICS    2. Benign essential hypertension  -     cloNIDine HCl (CATAPRES) 0.1 mg tablet; TAKE 1 TABLET BY MOUTH TWICE DAILY  -     CBC WITH AUTOMATED DIFF; Future  -     LIPID PANEL; Future  -     METABOLIC PANEL, COMPREHENSIVE; Future    3. Breast cancer screening  -     SHEA MAMMO BI SCREENING INCL CAD; Future    4. Encounter for hepatitis C screening test for low risk patient  -     HEPATITIS C AB;  Future        Health Maintenance Due   Topic Date Due    Hepatitis C Screening  1950    DTaP/Tdap/Td series (1 - Tdap) 05/28/1969    Shingrix Vaccine Age 50> (1 of 2) 05/28/1998    GLAUCOMA SCREENING Q2Y  05/28/2013    Pneumococcal 65+ years (2 of 2 - PPSV23) 09/12/2017    MEDICARE YEARLY EXAM  03/14/2018    BREAST CANCER SCRN MAMMOGRAM  06/01/2019

## 2019-06-27 NOTE — PROGRESS NOTES
ROOM # 3    Belén Shows presents today for   Chief Complaint   Patient presents with    Hand Pain     pt states that she has been experiencing right hand pain and numbness that radiates to her elbow that started years ago and now aggravating left hand N&T       Belén Shows preferred language for health care discussion is english/other.     Is someone accompanying this pt? no    Is the patient using any DME equipment during OV? no    Depression Screening:  3 most recent PHQ Screens 5/30/2019 4/19/2019 1/21/2019 10/24/2018 4/23/2018 10/12/2017 7/31/2014   PHQ Not Done - (No Data) - - Active Diagnosis of Depression or Bipolar Disorder - Active Diagnosis of Depression or Bipolar Disorder   Little interest or pleasure in doing things Several days Not at all Not at all Not at all - Not at all -   Feeling down, depressed, irritable, or hopeless Nearly every day Not at all Not at all Not at all - Not at all -   Total Score PHQ 2 4 0 0 0 - 0 -   Trouble falling or staying asleep, or sleeping too much Not at all - - Several days - - -   Feeling tired or having little energy More than half the days - - Nearly every day - - -   Poor appetite, weight loss, or overeating Several days - - Several days - - -   Feeling bad about yourself - or that you are a failure or have let yourself or your family down Not at all - - Not at all - - -   Trouble concentrating on things such as school, work, reading, or watching TV Several days - - Several days - - -   Moving or speaking so slowly that other people could have noticed; or the opposite being so fidgety that others notice Several days - - Not at all - - -   Thoughts of being better off dead, or hurting yourself in some way Several days - - Not at all - - -   PHQ 9 Score 10 - - 6 - - -   How difficult have these problems made it for you to do your work, take care of your home and get along with others Somewhat difficult - - Not difficult at all - - -       Learning Assessment:  Learning Assessment 1/21/2019 1/13/2017 2/6/2014   PRIMARY LEARNER Patient Patient Patient   HIGHEST LEVEL OF EDUCATION - PRIMARY LEARNER  2 YEARS OF COLLEGE 2 YEARS OF COLLEGE -   BARRIERS PRIMARY LEARNER - NONE -   CO-LEARNER CAREGIVER No No -   PRIMARY LANGUAGE ENGLISH ENGLISH ENGLISH   LEARNER PREFERENCE PRIMARY DEMONSTRATION VIDEOS DEMONSTRATION     - DEMONSTRATION VIDEOS   ANSWERED BY PATIENT patient patient   RELATIONSHIP SELF SELF SELF       Abuse Screening:  Abuse Screening Questionnaire 1/21/2019 1/13/2017 7/31/2014   Do you ever feel afraid of your partner? N N N   Are you in a relationship with someone who physically or mentally threatens you? N N N   Is it safe for you to go home? Vicci Bowels       Fall Risk  Fall Risk Assessment, last 12 mths 6/27/2019 4/19/2019 2/21/2019 1/21/2019 1/19/2018 10/12/2017 10/20/2016   Able to walk? Yes Yes Yes Yes Yes Yes Yes   Fall in past 12 months? Yes Yes No Yes No Yes No   Fall with injury? Yes No - No - Yes -   Number of falls in past 12 months 1 2 - 1 - 1 -   Fall Risk Score 2 2 - 1 - 2 -       Health Maintenance reviewed and discussed per provider. Yes    Miri Shah is due for   Health Maintenance Due   Topic Date Due    Hepatitis C Screening  1950    DTaP/Tdap/Td series (1 - Tdap) 05/28/1969    Shingrix Vaccine Age 50> (1 of 2) 05/28/1998    GLAUCOMA SCREENING Q2Y  05/28/2013    Pneumococcal 65+ years (2 of 2 - PPSV23) 09/12/2017    MEDICARE YEARLY EXAM  03/14/2018    BREAST CANCER SCRN MAMMOGRAM  06/01/2019         Please order/place referral if appropriate. Advance Directive:  1. Do you have an advance directive in place? Patient Reply: no    2. If not, would you like material regarding how to put one in place? Patient Reply: no    Coordination of Care:  1. Have you been to the ER, urgent care clinic since your last visit? Hospitalized since your last visit? no    2.  Have you seen or consulted any other health care providers outside of the [facility] since your last visit? Include any pap smears or colon screening.  no

## 2019-06-28 ENCOUNTER — OFFICE VISIT (OUTPATIENT)
Dept: PAIN MANAGEMENT | Age: 69
End: 2019-06-28

## 2019-06-28 VITALS
BODY MASS INDEX: 28.47 KG/M2 | SYSTOLIC BLOOD PRESSURE: 159 MMHG | DIASTOLIC BLOOD PRESSURE: 75 MMHG | HEART RATE: 64 BPM | HEIGHT: 60 IN | OXYGEN SATURATION: 96 % | RESPIRATION RATE: 18 BRPM | WEIGHT: 145 LBS | TEMPERATURE: 98.3 F

## 2019-06-28 DIAGNOSIS — M70.62 TROCHANTERIC BURSITIS OF LEFT HIP: Primary | ICD-10-CM

## 2019-06-28 DIAGNOSIS — Z79.899 ENCOUNTER FOR LONG-TERM (CURRENT) USE OF MEDICATIONS: ICD-10-CM

## 2019-06-28 RX ORDER — LIDOCAINE HYDROCHLORIDE 10 MG/ML
3 INJECTION INFILTRATION; PERINEURAL ONCE
Qty: 3 ML | Refills: 0
Start: 2019-06-28 | End: 2019-06-28

## 2019-06-28 RX ORDER — TRIAMCINOLONE ACETONIDE 40 MG/ML
40 INJECTION, SUSPENSION INTRA-ARTICULAR; INTRAMUSCULAR ONCE
Qty: 1 ML | Refills: 0
Start: 2019-06-28 | End: 2019-06-28

## 2019-06-28 NOTE — PATIENT INSTRUCTIONS
Post Injection Instructions    If you develop any abnormal symptoms, such as itching, swelling, redness, rash, or shortness of breath, please call our office at [phone]. Normally, these are temporary symptoms, which resolve within several hours to a day, but our office is more than happy to answer any questions you may have. The provider would like you to observe the injection area for redness, swelling, or increased heat. If any or all of these reactions occur, please call our office as soon as possible. This reaction may indicate the first signs of infection. Although very rare, it is  best if caught early. I have reviewed these instructions and the patient verbalizes understanding.

## 2019-06-28 NOTE — PROGRESS NOTES
Nursing Notes    Patient presents to the office today in follow-up. Patient rates her pain at 8/10 on the numerical pain scale.  reviewed NO  Any aberrancies noted on  NO  Last opioid agreement 01/07/19  Last urine drug screen 01/07/19    Comments:     POC UDS was not performed in office today    Any new labs or imaging since last appointment? NO    Have you been to an emergency room (ER) or urgent care clinic since your last visit? NO            Have you been hospitalized since your last visit? NO     If yes, where, when, and reason for visit? Have you seen or consulted any other health care providers outside of the [address] [place] [address]  since your last visit? NO     If yes, where, when, and reason for visit? Ms. Avendaño  has a reminder for a \"due or due soon\" health maintenance. I have asked that she contact her primary care provider for follow-up on this health maintenance.

## 2019-06-28 NOTE — PROGRESS NOTES
HPI:  Carmelita Bland is a 68 y.o. female here for left trochanteric bursa injection. She denies interval changes in the character or distribution of her pain. She reports ongoing left lateral hip region pain. The pain is described as a constant aching pain that ranges from 6-9 over 10. Symptoms are worsened by left side-lying and by moving from sit to stand and by pressure over the area. GIC- 4 and 7  LYN- 58%      Allergies   Allergen Reactions    Morphine Unknown (comments)     Hallucinations  Pt. Denies allergy       Current Outpatient Medications on File Prior to Visit   Medication Sig    cloNIDine HCl (CATAPRES) 0.1 mg tablet TAKE 1 TABLET BY MOUTH TWICE DAILY    [START ON 6/29/2019] oxyCODONE-acetaminophen (PERCOCET 10)  mg per tablet Take 1 Tab by mouth two (2) times daily as needed for Pain (70 tabs to be budgeted over 30 days) for up to 30 days. Max Daily Amount: 2 Tabs.  amLODIPine (NORVASC) 5 mg tablet TAKE 1 TABLET BY MOUTH DAILY    carvedilol (COREG) 12.5 mg tablet TAKE 1 TABLET BY MOUTH TWICE DAILY WITH MEALS    omeprazole (PRILOSEC OTC) 20 mg tablet Take 20 mg by mouth daily.  dicyclomine (BENTYL) 10 mg capsule Take 10 mg by mouth 4 times daily (before meals and nightly).  ARIPiprazole (ABILIFY) 10 mg tablet Take 1/2 to 1 tablet by mouth daily as directed    l-methylfolate (DEPLIN) 15 mg tablet Take 15 mg by mouth daily.  colestipol (COLESTID) 1 gram tablet Take 1 g by mouth two (2) times a day.  azelastine (ASTELIN) 137 mcg (0.1 %) nasal spray 1 [place] by Both Nostrils route two (2) times a day. Use in each nostril as directed    sertraline (ZOLOFT) 100 mg tablet Take 200 mg by mouth daily.  naloxone 4 mg/actuation spry 4 mg by Nasal route as needed for up to 2 doses. Indications: OPIOID TOXICITY     No current facility-administered medications on file prior to visit.         ROS:  Review of systems is negative for fever, chills, nausea, vomiting, diarrhea, constipation, chest pain, shortness of breath, abdominal pain, weakness, trouble swallowing, acute changes in vision, acute changes in hearing, falls, dizziness, bladder incontinence, bowel incontinence, depression, anxiety, suicidal ideation, homicidal ideation, alcohol use. Review of systems positive for left hip pain, cervicothoracic pain    Vitals:    06/28/19 1409   BP: 159/75   Pulse: 64   Resp: 18   Temp: 98.3 °F (36.8 °C)   TempSrc: Oral   SpO2: 96%   Weight: 65.8 kg (145 lb)   Height: 5' (1.524 m)   PainSc:   8   PainLoc: Hip          PE:  AFVSS, no acute distress, normal body habitus. A&OXs 3.  normocephalic, atraumatic. Conjugate gaze, clear sclerae. Speech is clear and appropriate. Breathing is nonlabored. There is tenderness to palpation overlying the left greater trochanteric region. Gait is within functional limits. Balance is within functional limits. Primary Care Physician  Paula Collazo  [address] [place] [phone]    Assessment/Plan:     ICD-10-CM ICD-9-CM    1. Trochanteric bursitis of left hip M70.62 726.5         Patient presents today for corticosteroid injection into the left greater trochanteric bursa region. Risks, benefits, alternatives were discussed and all questions were answered. She agrees to proceed with the above-mentioned intervention. She tolerated the procedure without complaints. There were no immediate complications. F/u:. Follow-up via telephone as instructed. Follow-up as needed for procedure related concerns. Maintain regularly scheduled office visit.         Procedure Note  [facility]  OFFICE PROCEDURE PROGRESS NOTE        Chart reviewed for the following:   Nish KENT DO, have reviewed the History, Physical and updated the Allergic reactions for [address] performed immediately prior to start of procedure:   Nish KENT DO, have performed the following reviews on Major Abrams prior to the start of the procedure:            * Patient was identified by name and date of birth   * Agreement on procedure being performed was verified  * Risks and Benefits explained to the patient  * Procedure site verified and marked as necessary  * Patient was positioned for comfort  * Consent was signed and verified     Time: 9627    Date of procedure: 6/28/2019    Procedure performed by:  Beryle Bennett, DO    Provider assisted by: Blaire García LPN     Patient assisted by: self    How tolerated by patient: Patient tolerated the procedure without complaints. Post Procedural Pain Scale: See procedure note. Comments: none, See note for details. Left greater Trochanteric Bursa Injection    Patient was positioned in side-lying with the affected hip up and exposed. Point of maximum tenderness was identified and marked. Timeout was performed. Area was prepped and draped in the usual sterile fashion. Injection site was pretreated with vapo coolant spray. Then following negative aspiration 40 mg of Kenalog mixed with 3 mL of 1% lidocaine was injected using a 2.5 in 22G needle. There was no blood loss there were no immediate complications. Area was covered with a bandage. Postprocedure pain relief with provocation was 100% with palpation and 40% with sit to stand activity. Patient was monitored for 10 minutes postprocedure and discharged in stable condition.

## 2019-07-17 ENCOUNTER — OFFICE VISIT (OUTPATIENT)
Dept: ORTHOPEDIC SURGERY | Facility: CLINIC | Age: 69
End: 2019-07-17

## 2019-07-17 VITALS
SYSTOLIC BLOOD PRESSURE: 153 MMHG | HEIGHT: 60 IN | HEART RATE: 66 BPM | OXYGEN SATURATION: 92 % | DIASTOLIC BLOOD PRESSURE: 76 MMHG | TEMPERATURE: 97.8 F | WEIGHT: 146 LBS | RESPIRATION RATE: 16 BRPM | BODY MASS INDEX: 28.66 KG/M2

## 2019-07-17 DIAGNOSIS — M79.641 BILATERAL HAND PAIN: Primary | ICD-10-CM

## 2019-07-17 DIAGNOSIS — M79.642 BILATERAL HAND PAIN: Primary | ICD-10-CM

## 2019-07-17 DIAGNOSIS — G56.03 BILATERAL CARPAL TUNNEL SYNDROME: ICD-10-CM

## 2019-07-17 NOTE — PROGRESS NOTES
1. Have you been to the ER, urgent care clinic since your last visit? Hospitalized since your last visit? NO    2. Have you seen or consulted any other health care providers outside of the [address] [place] [address] since your last visit? Include any pap smears or colon screening.  Yes, Helen Mari NP

## 2019-07-17 NOTE — PROGRESS NOTES
Enrique Toth is a 68 y.o. female right handed individual, not currently working. Worker's Compensation and legal considerations: not known and none filed. Vitals:    07/17/19 1355   BP: 153/76   Pulse: 66   Resp: 16   Temp: 97.8 °F (36.6 °C)   TempSrc: Oral   SpO2: 92%   Weight: 146 lb (66.2 kg)   Height: 5' (1.524 m)   PainSc:  10 - Worst pain ever           Chief Complaint   Patient presents with    Wrist Pain     right wrist pain         HPI: Patient comes in today with complaints of bilateral hand numbness and tingling. The right side is been going on for some time and significantly worse. She says the left side is slowly bothering her.     Date of onset:  Indeterminate    Injury: No    Prior Treatment:  Yes: Comment: Right carpal tunnel brace    Numbness/ Tingling: Yes: Comment: Bilateral hands thumb index and middle right worse than left    ROS: Review of Systems - General ROS: negative  Respiratory ROS: no cough, shortness of breath, or wheezing  Cardiovascular ROS: no chest pain or dyspnea on exertion  Musculoskeletal ROS: positive for - pain in hand - bilateral  Neurological ROS: positive for - numbness/tingling  Dermatological ROS: negative    Past Medical History:   Diagnosis Date    Abdominal pain     Benign essential hypertension     Cancer (HCC)     uterine    Depressive disorder     Fatigue     Hypertension     Insomnia     NATHANAEL (obstructive sleep apnea)     does not use cpap machine    Osteoarthritis     Peptic ulcer 2012    Spinal stenosis     with chronic pain       Past Surgical History:   Procedure Laterality Date    HX APPENDECTOMY  1962    HX BACK SURGERY  1986    PHLD; L4-L5    HX GASTRIC BYPASS  2009    HX HYSTERECTOMY  1983    uterine cancer, stage 1    HX KNEE REPLACEMENT  2005    right    HX VISHAL AND BSO         Current Outpatient Medications   Medication Sig Dispense Refill    cloNIDine HCl (CATAPRES) 0.1 mg tablet TAKE 1 TABLET BY MOUTH TWICE DAILY 180 Tab 0  oxyCODONE-acetaminophen (PERCOCET 10)  mg per tablet Take 1 Tab by mouth two (2) times daily as needed for Pain (70 tabs to be budgeted over 30 days) for up to 30 days. Max Daily Amount: 2 Tabs. 70 Tab 0    amLODIPine (NORVASC) 5 mg tablet TAKE 1 TABLET BY MOUTH DAILY 90 Tab 0    carvedilol (COREG) 12.5 mg tablet TAKE 1 TABLET BY MOUTH TWICE DAILY WITH MEALS 180 Tab 0    omeprazole (PRILOSEC OTC) 20 mg tablet Take 20 mg by mouth daily.  dicyclomine (BENTYL) 10 mg capsule Take 10 mg by mouth 4 times daily (before meals and nightly).  ARIPiprazole (ABILIFY) 10 mg tablet Take 1/2 to 1 tablet by mouth daily as directed      naloxone 4 mg/actuation spry 4 mg by Nasal route as needed for up to 2 doses. Indications: OPIOID TOXICITY 1 Box 1    l-methylfolate (DEPLIN) 15 mg tablet Take 15 mg by mouth daily.  colestipol (COLESTID) 1 gram tablet Take 1 g by mouth two (2) times a day.  azelastine (ASTELIN) 137 mcg (0.1 %) nasal spray 1 [place] by Both Nostrils route two (2) times a day. Use in each nostril as directed 1 Bottle 2    sertraline (ZOLOFT) 100 mg tablet Take 200 mg by mouth daily. Allergies   Allergen Reactions    Morphine Unknown (comments)     Hallucinations  Pt. Denies allergy           PE:     NEUROVASCULAR    Examination L R Examination L R   Carpal Comp. + + Pronator Comp. - -   Carpal Tinel + + Pronator Tinel - -   Phalen's + + Pronator Stress - -   Cubital Comp. - - Guyon Comp. - -   Cubital Tinel - - Guyon Tinel - -   Elbow Hyperflexion - - Adson's - -   Spurling's - - SC Comp. - -   PCB Median abn - - SC Tinel - -   Radial Tinel - - IC Comp. - -   Digital Tinel - - IC Tinel - -   Radial 2-Pt WNL WNL Ulnar 2-Pt WNL WNL     Radial Pulse: 2+  Capillary Refill: < 2 sec  Nick: Not Performed  Digital Nick: Not Performed      Imaging: None indicated        ICD-10-CM ICD-9-CM    1. Bilateral hand pain M79.641 729.5 EMG TWO EXTREMITIES UPPER    M79.642     2.  Bilateral carpal tunnel syndrome G56.03 354.0 AMB SUPPLY ORDER      EMG TWO EXTREMITIES UPPER      NCV/LAT MOTOR PER NERVE UP/LT      NCV/LAT MOTOR PER NERVE UP/RT       Plan:     Bilateral upper extremity EMG and nerve conduction studies    Left carpal tunnel brace to be worn along with the right when she Shayne has at night    Follow-up after EMG    Plan was reviewed with patient, who verbalized agreement and understanding of the plan

## 2019-07-19 ENCOUNTER — OFFICE VISIT (OUTPATIENT)
Dept: PAIN MANAGEMENT | Age: 69
End: 2019-07-19

## 2019-07-19 ENCOUNTER — HOSPITAL ENCOUNTER (OUTPATIENT)
Dept: MAMMOGRAPHY | Age: 69
Discharge: HOME | End: 2019-07-19
Attending: NURSE PRACTITIONER
Payer: MEDICARE

## 2019-07-19 VITALS
SYSTOLIC BLOOD PRESSURE: 145 MMHG | RESPIRATION RATE: 16 BRPM | OXYGEN SATURATION: 96 % | HEIGHT: 60 IN | WEIGHT: 146 LBS | BODY MASS INDEX: 28.66 KG/M2 | DIASTOLIC BLOOD PRESSURE: 63 MMHG | TEMPERATURE: 97.5 F | HEART RATE: 66 BPM

## 2019-07-19 DIAGNOSIS — Z12.39 BREAST CANCER SCREENING: ICD-10-CM

## 2019-07-19 DIAGNOSIS — M54.50 CHRONIC LEFT-SIDED LOW BACK PAIN WITHOUT SCIATICA: Primary | ICD-10-CM

## 2019-07-19 DIAGNOSIS — M70.62 TROCHANTERIC BURSITIS OF LEFT HIP: ICD-10-CM

## 2019-07-19 DIAGNOSIS — G89.29 CHRONIC LEFT-SIDED LOW BACK PAIN WITHOUT SCIATICA: Primary | ICD-10-CM

## 2019-07-19 DIAGNOSIS — M51.37 DEGENERATION OF LUMBAR OR LUMBOSACRAL INTERVERTEBRAL DISC: ICD-10-CM

## 2019-07-19 DIAGNOSIS — M25.551 CHRONIC HIP PAIN, BILATERAL: ICD-10-CM

## 2019-07-19 DIAGNOSIS — G89.4 CHRONIC PAIN SYNDROME: ICD-10-CM

## 2019-07-19 DIAGNOSIS — M25.552 CHRONIC HIP PAIN, BILATERAL: ICD-10-CM

## 2019-07-19 DIAGNOSIS — M54.2 NECK PAIN: ICD-10-CM

## 2019-07-19 DIAGNOSIS — M96.1 POSTLAMINECTOMY SYNDROME, LUMBAR REGION: ICD-10-CM

## 2019-07-19 DIAGNOSIS — G89.29 CHRONIC HIP PAIN, BILATERAL: ICD-10-CM

## 2019-07-19 DIAGNOSIS — Z79.899 ENCOUNTER FOR LONG-TERM (CURRENT) USE OF MEDICATIONS: ICD-10-CM

## 2019-07-19 PROCEDURE — 77067 SCR MAMMO BI INCL CAD: CPT

## 2019-07-19 RX ORDER — OXYCODONE AND ACETAMINOPHEN 10; 325 MG/1; MG/1
1 TABLET ORAL
Qty: 75 TAB | Refills: 0 | Status: SHIPPED | OUTPATIENT
Start: 2019-07-31 | End: 2019-09-05 | Stop reason: SDUPTHER

## 2019-07-19 RX ORDER — GABAPENTIN 100 MG/1
CAPSULE ORAL
Qty: 69 CAP | Refills: 0 | Status: SHIPPED | OUTPATIENT
Start: 2019-07-19 | End: 2019-08-18

## 2019-07-19 NOTE — PATIENT INSTRUCTIONS

## 2019-07-19 NOTE — PROGRESS NOTES
Nursing Notes    Patient presents to the office today in follow-up. Patient rates her pain at 8/10 on the numerical pain scale. Patient did not bring medication bottle(s) Percocet 10/325 mg tab for this office visit; advised to bring in all medication bottles, empty or otherwise, for all office visits per [facility] policy.  shows last fill date as 7/1/19. Patient states her last dose was this morning. Patient also states that they forgot their Rx d/t rushing for another appointment, and [place] too far out to go back home.  reviewed YES  Any aberrancies noted on  NO  Last opioid agreement 1/7/19  Last urine drug screen 6/28/19    Comments:     POC UDS was not performed in office today    Any new labs or imaging since last appointment? YES    Have you been to an emergency room (ER) or urgent care clinic since your last visit? NO            Have you been hospitalized since your last visit? NO     If yes, where, when, and reason for visit? Have you seen or consulted any other health care providers outside of the [address] [place] [address]  since your last visit? YES     If yes, where, when, and reason for visit? Ms. Anuradha Parnell has a reminder for a \"due or due soon\" health maintenance. I have asked that she contact her primary care provider for follow-up on this health maintenance.     3 most recent PHQ Screens 7/19/2019   PHQ Not Done -   Little interest or pleasure in doing things Several days   Feeling down, depressed, irritable, or hopeless Several days   Total Score PHQ 2 2   Trouble falling or staying asleep, or sleeping too much Not at all   Feeling tired or having little energy Several days   Poor appetite, weight loss, or overeating Not at all   Feeling bad about yourself - or that you are a failure or have let yourself or your family down Not at all   Trouble concentrating on things such as school, work, reading, or watching TV Not at all   Moving or speaking so slowly that other people could have noticed; or the opposite being so fidgety that others notice Not at all   Thoughts of being better off dead, or hurting yourself in some way Not at all   PHQ 9 Score 3   How difficult have these problems made it for you to do your work, take care of your home and get along with others Not difficult at all

## 2019-07-22 NOTE — PROGRESS NOTES
Referral date 8/4/2009, source Dr Shayan Haney and for neck and back pain. HPI:  Gloria Yu is a 68 y.o. female here for f/u visit for ongoing evaluation of back and bilateral hip pain. She is s/p L4/5 fusion. Pt was last seen here on 5/30/19. Pt denies interval changes on the character or distribution of pain. Pain is located along cervical spine described as stabbing, along thoracic spine described as aching and along lumbar spine described as stabbing. Pain is also located to left hip. Pain at its best is 6/10. Pain at its worse is 9/10. The pain is worsened by cold rainy weather, lifting heavy objects, vacuuming, prolonged walking, prolonged sitting and prolonged standing. She states TENS unit use, massage therapy and chiropractor therapy worsened her pain. Symptoms are improved by Tylenol, Percocet, topical Bengay, topical Aspercreme, heat and PT for hip & back. Pt has tried compound cream, ice and epidural steroid injections with no perceived benefit. Elavil in the past, not sure if it was for pain. She has never tried aquatic PT, acupuncture, yoga, SCS trial, implantable pain pump, Cymbalta, Topamax or Lyrica. Since last visit, no benefit from topical capsaicin cream. Lyrica ordered was not covered by her insurance and was told to try Gabapentin first. Pt states she is confused because she was told Gabapentin wasn't covered by her insurance prior. Pt states she would like to try Gabapentin again. She has not seen her PCP regarding new pain areas or smoking cessation strategies. She requests pain management referral to Dr Zia Murdock as she states they are closer to her home.     Interventional Pain Procedures  6/28/19 left greater trochanter bursa injection with Dr Eliot Sullivan stating it did not provide any relief       Social History     Socioeconomic History    Marital status:      Spouse name: Not on file    Number of children: Not on file    Years of education: Not on file    Highest education level: Not on file   Occupational History    Not on file   Social Needs    Financial resource strain: Not on file    Food insecurity:     Worry: Not on file     Inability: Not on file    Transportation needs:     Medical: Not on file     Non-medical: Not on file   Tobacco Use    Smoking status: Current Every Day Smoker     Packs/day: 0.50     Types: Cigarettes    Smokeless tobacco: Never Used   Substance and Sexual Activity    Alcohol use: Yes     Comment: socially    Drug use: No     Comment: denies    Sexual activity: Never     Partners: Male   Lifestyle    Physical activity:     Days per week: Not on file     Minutes per session: Not on file    Stress: Not on file   Relationships    Social connections:     Talks on phone: Not on file     Gets together: Not on file     Attends Buddhist service: Not on file     Active member of club or organization: Not on file     Attends meetings of clubs or organizations: Not on file     Relationship status: Not on file    Intimate partner violence:     Fear of current or ex partner: Not on file     Emotionally abused: Not on file     Physically abused: Not on file     Forced sexual activity: Not on file   Other Topics Concern    Not on file   Social History Narrative    Not on file     Family History   Problem Relation Age of Onset    Cancer Mother         lung-nonsmoker    Diabetes Other     Heart Disease Other      Allergies   Allergen Reactions    Morphine Unknown (comments)     Hallucinations  Pt.  Denies allergy       Past Medical History:   Diagnosis Date    Abdominal pain     Benign essential hypertension     Cancer (HCC)     uterine    Depressive disorder     Fatigue     Hypertension     Insomnia     NATHANAEL (obstructive sleep apnea)     does not use cpap machine    Osteoarthritis     Peptic ulcer 2012    Spinal stenosis     with chronic pain     Past Surgical History:   Procedure Laterality Date    HX APPENDECTOMY  1962    [address]  PHLD; L4-L5    HX GASTRIC BYPASS  2009    HX HYSTERECTOMY  1983    uterine cancer, stage 1    HX KNEE REPLACEMENT  2005    right    HX VISHAL AND BSO       Current Outpatient Medications on File Prior to Visit   Medication Sig    cloNIDine HCl (CATAPRES) 0.1 mg tablet TAKE 1 TABLET BY MOUTH TWICE DAILY    amLODIPine (NORVASC) 5 mg tablet TAKE 1 TABLET BY MOUTH DAILY    carvedilol (COREG) 12.5 mg tablet TAKE 1 TABLET BY MOUTH TWICE DAILY WITH MEALS    omeprazole (PRILOSEC OTC) 20 mg tablet Take 20 mg by mouth daily.  dicyclomine (BENTYL) 10 mg capsule Take 10 mg by mouth 4 times daily (before meals and nightly).  ARIPiprazole (ABILIFY) 10 mg tablet Take 1/2 to 1 tablet by mouth daily as directed    l-methylfolate (DEPLIN) 15 mg tablet Take 15 mg by mouth daily.  colestipol (COLESTID) 1 gram tablet Take 1 g by mouth two (2) times a day.  sertraline (ZOLOFT) 100 mg tablet Take 200 mg by mouth daily.  naloxone 4 mg/actuation spry 4 mg by Nasal route as needed for up to 2 doses. Indications: OPIOID TOXICITY    azelastine (ASTELIN) 137 mcg (0.1 %) nasal spray 1 [place] by Both Nostrils route two (2) times a day. Use in each nostril as directed     No current facility-administered medications on file prior to visit. ROS:  Reports diarrhea, constipation and bladder incontinence (not new, has been present for several years). Denies fever, chills, nausea, vomiting, chest pain, shortness or breath/trouble breathing, abdominal pain, trouble swallowing, changes in vision, changes in hearing, falls, weakness, dizziness, bowel incontinence, depression, anxiety, suicidal ideations, homicidal ideations or alcohol use. Opioid specific risk: depression, obstructive sleep apnea, history of gastric bypass surgery, intermittent alcohol use, insomnia, tobacco use.       Vitals:    07/19/19 1403   BP: 145/63   Pulse: 66   Resp: 16   Temp: 97.5 °F (36.4 °C)   TempSrc: Oral   SpO2: 96%   Weight: 66.2 kg (146 lb)   Height: 5' (1.524 m)   PainSc:   8   PainLoc: Back          Physical exam:  AFVSS, no acute distress, normal body habitus. A&OXs 3. Normocephalic, atraumatic. Conjugate gaze, clear sclerae. Speech is clear and appropriate. Mood is appropriate and patient is cooperative. Gait and balance are within functional limits. Non-labored breathing. Calculated MEQ - 45  Naloxone rescue - yes  Prophylactic bowel program - no  Date of last OCA 1/7/19  Last UDS 1/7/19, consistent Ethyl glucuronide 29,362ng/ml, Ethyl sulfate 6,135ng/ml.  date checked today, findings consistent    Primary Care Physician  [facility]  [address] [facility] [facility] [address] [id number]  [phone]    Today   Last Visit Prior Visit(s)  PGIC - 5 & 5  1 & 7  5 & 6 6 & 6 6 & 4  LYN - 44%  60%  47% 42% 46%  COMM - 5  6  6  4 6    PHQ -- .  3 most recent PHQ Screens 7/19/2019   PHQ Not Done -   Little interest or pleasure in doing things Several days   Feeling down, depressed, irritable, or hopeless Several days   Total Score PHQ 2 2   Trouble falling or staying asleep, or sleeping too much Not at all   Feeling tired or having little energy Several days   Poor appetite, weight loss, or overeating Not at all   Feeling bad about yourself - or that you are a failure or have let yourself or your family down Not at all   Trouble concentrating on things such as school, work, reading, or watching TV Not at all   Moving or speaking so slowly that other people could have noticed; or the opposite being so fidgety that others notice Not at all   Thoughts of being better off dead, or hurting yourself in some way Not at all   PHQ 9 Score 3   How difficult have these problems made it for you to do your work, take care of your home and get along with others Not difficult at all         Assessment/Plan:     ICD-10-CM ICD-9-CM    1.  Chronic left-sided low back pain without sciatica M54.5 724.2 gabapentin (NEURONTIN) 100 mg capsule    G89.29 338.29 REFERRAL TO PAIN MANAGEMENT      oxyCODONE-acetaminophen (PERCOCET 10)  mg per tablet   2. Neck pain M54.2 723.1 gabapentin (NEURONTIN) 100 mg capsule      REFERRAL TO PAIN MANAGEMENT      oxyCODONE-acetaminophen (PERCOCET 10)  mg per tablet   3. Degeneration of lumbar or lumbosacral intervertebral disc M51.37 722.52 gabapentin (NEURONTIN) 100 mg capsule      REFERRAL TO PAIN MANAGEMENT      oxyCODONE-acetaminophen (PERCOCET 10)  mg per tablet   4. Postlaminectomy syndrome, lumbar region M96.1 722.83 gabapentin (NEURONTIN) 100 mg capsule      REFERRAL TO PAIN MANAGEMENT      oxyCODONE-acetaminophen (PERCOCET 10)  mg per tablet   5. Chronic pain syndrome G89.4 338.4 gabapentin (NEURONTIN) 100 mg capsule      REFERRAL TO PAIN MANAGEMENT      oxyCODONE-acetaminophen (PERCOCET 10)  mg per tablet   6. Chronic hip pain, bilateral M25.551 719.45 oxyCODONE-acetaminophen (PERCOCET 10)  mg per tablet    M25.552 338.29     G89.29     7. Trochanteric bursitis of left hip M70.62 726.5 gabapentin (NEURONTIN) 100 mg capsule      REFERRAL TO PAIN MANAGEMENT      oxyCODONE-acetaminophen (PERCOCET 10)  mg per tablet   8. Encounter for long-term (current) use of medications Z79.899 V58.69         --I do not recommend long term opioid therapy for this patient at this time for their chronic pain; the risks outweigh the potential benefits. Pt currently taking Percocet 10/325mg up to 3 times a day as needed with a total of 70 tabs to be budgeted over 30 days. Their MME is 39. --She reports acute flair up of her chronic pain and requests 80 tablets today. Offered Toradol injection but she declines. Today, we will pause the weaning of patients opioid medication with end goal of being opioid free, pending safety and compliance. Pt instructed to call if they experience any signs of withdrawal (diarrhea, nausea, vomiting, sweating or chills, agitation, itching).  She is provided with Percocet 10/325mg up to 3 times a day as needed with a total of 75 tabs to be budgeted over 30 days. --Pt instructed to call 5-7 days before they run out of their medications for refill. At this time pt will be provided with Percocet 10/325mg up to 2 times a day as needed with a total of 70 tabs to be used over 30 days, pending safety and compliance. Their new MME will be 30. --At next office visit, the plan is to provide patient with Percocet 10/325mg up to 3 times a day as needed with a total of 65 tabs to be budgeted over 30 days. If patient has difficulty with the wean or difficulty with cravings we will consider referral to mental health for ongoing assessment and treatment for opioid use disorder. --Will order Gabapentin today as her insurance did not cover Lyrica and requires trial of Gabapentin first.   --No efficacy from capsaicin cream.  --Pt may also benefit from aquatic PT, acupuncture therapy and/or yoga therapy in the future. --Pt to discuss new painful areas and aggressive cessation strategies with her PCP as previously recommended. --Referral provided for alternate pain clinic closer to her home; pt requests Dr Bobo Lynn. --Follow up ongoing assessment and ongoing development of integrative and comprehensive plan of care for chronic pain. Goals: To establish complementary and integrative plan of care to address chronic pain issues while minimizing pharmaceuticals to maximize patient's function improve quality of life. Education:  Patient again educated on the importance of strict compliance with the opioid care agreement while on opioid therapy. Patient also again educated that they should avoid driving while on chronic opioid therapy. Also advised to avoid alcohol and to avoid benzodiazepines while on opioid therapy. Handouts given regarding opioid safety. Follow-up and Dispositions    · Return in about 2 months (around 9/19/2019) for 30 min.            [address] was used for portions of this report. Unintended errors may occur.

## 2019-07-25 NOTE — PROGRESS NOTES
Patient contacted, patient identified with two identifiers (Name & ). Patient aware of normal results  and verbalizes understanding.

## 2019-07-26 ENCOUNTER — DOCUMENTATION ONLY (OUTPATIENT)
Dept: PAIN MANAGEMENT | Age: 69
End: 2019-07-26

## 2019-07-26 NOTE — PROGRESS NOTES
Faxed last OV note, demographic sheet, and referral to Dr.Beth Mast's office today, 7/26/19 to [phone], and received confirmation at 13:37 pm that fax went through.

## 2019-07-30 RX ORDER — CARVEDILOL 12.5 MG/1
TABLET ORAL
Qty: 180 TAB | Refills: 0 | Status: SHIPPED | OUTPATIENT
Start: 2019-07-30 | End: 2019-10-16 | Stop reason: SDUPTHER

## 2019-08-15 ENCOUNTER — TELEPHONE (OUTPATIENT)
Dept: PAIN MANAGEMENT | Age: 69
End: 2019-08-15

## 2019-08-15 NOTE — TELEPHONE ENCOUNTER
Sent PA for Gabapentin 100 mg cap to [facility] via [employer]. Currently waiting for response.

## 2019-08-19 ENCOUNTER — OFFICE VISIT (OUTPATIENT)
Dept: FAMILY MEDICINE CLINIC | Facility: CLINIC | Age: 69
End: 2019-08-19

## 2019-08-19 VITALS
SYSTOLIC BLOOD PRESSURE: 130 MMHG | TEMPERATURE: 97.5 F | OXYGEN SATURATION: 99 % | DIASTOLIC BLOOD PRESSURE: 60 MMHG | BODY MASS INDEX: 28.39 KG/M2 | WEIGHT: 144.6 LBS | HEIGHT: 60 IN | RESPIRATION RATE: 12 BRPM | HEART RATE: 66 BPM

## 2019-08-19 DIAGNOSIS — I10 BENIGN ESSENTIAL HYPERTENSION: Primary | ICD-10-CM

## 2019-08-19 DIAGNOSIS — R42 DIZZINESS: ICD-10-CM

## 2019-08-19 NOTE — PROGRESS NOTES
ROOM # 6    Le Roslyn Guevara presents today for   Chief Complaint   Patient presents with    Hypertension     Pt complains of flutctuating blood pressure problem that started a week ago       Corina Lee preferred language for health care discussion is english/other.     Is someone accompanying this pt? no    Is the patient using any DME equipment during OV? no    Depression Screening:  3 most recent PHQ Screens 7/19/2019 5/30/2019 4/19/2019 1/21/2019 10/24/2018 4/23/2018 10/12/2017   PHQ Not Done - - (No Data) - - Active Diagnosis of Depression or Bipolar Disorder -   Little interest or pleasure in doing things Several days Several days Not at all Not at all Not at all - Not at all   Feeling down, depressed, irritable, or hopeless Several days Nearly every day Not at all Not at all Not at all - Not at all   Total Score PHQ 2 2 4 0 0 0 - 0   Trouble falling or staying asleep, or sleeping too much Not at all Not at all - - Several days - -   Feeling tired or having little energy Several days More than half the days - - Nearly every day - -   Poor appetite, weight loss, or overeating Not at all Several days - - Several days - -   Feeling bad about yourself - or that you are a failure or have let yourself or your family down Not at all Not at all - - Not at all - -   Trouble concentrating on things such as school, work, reading, or watching TV Not at all Several days - - Several days - -   Moving or speaking so slowly that other people could have noticed; or the opposite being so fidgety that others notice Not at all Several days - - Not at all - -   Thoughts of being better off dead, or hurting yourself in some way Not at all Several days - - Not at all - -   PHQ 9 Score 3 10 - - 6 - -   How difficult have these problems made it for you to do your work, take care of your home and get along with others Not difficult at all Somewhat difficult - - Not difficult at all - -       Learning Assessment:  Learning Assessment 1/21/2019 1/13/2017 2/6/2014   PRIMARY LEARNER Patient Patient Patient   HIGHEST LEVEL OF EDUCATION - PRIMARY LEARNER  2 YEARS OF COLLEGE 2 YEARS OF COLLEGE -   BARRIERS PRIMARY LEARNER - NONE -   CO-LEARNER CAREGIVER No No -   PRIMARY LANGUAGE ENGLISH ENGLISH ENGLISH   LEARNER PREFERENCE PRIMARY DEMONSTRATION VIDEOS DEMONSTRATION     - DEMONSTRATION VIDEOS   ANSWERED BY PATIENT patient patient   RELATIONSHIP SELF SELF SELF       Abuse Screening:  Abuse Screening Questionnaire 1/21/2019 1/13/2017 7/31/2014   Do you ever feel afraid of your partner? N N N   Are you in a relationship with someone who physically or mentally threatens you? N N N   Is it safe for you to go home? Patricia Elder       Fall Risk  Fall Risk Assessment, last 12 mths 7/19/2019 7/17/2019 6/27/2019 4/19/2019 2/21/2019 1/21/2019 1/19/2018   Able to walk? Yes Yes Yes Yes Yes Yes Yes   Fall in past 12 months? No No Yes Yes No Yes No   Fall with injury? - - Yes No - No -   Number of falls in past 12 months - - 1 2 - 1 -   Fall Risk Score - - 2 2 - 1 -       Health Maintenance reviewed and discussed per provider. Yes    Harmon Muscat is due for   Health Maintenance Due   Topic Date Due    Hepatitis C Screening  1950    DTaP/Tdap/Td series (1 - Tdap) 05/28/1969    Shingrix Vaccine Age 50> (1 of 2) 05/28/1998    GLAUCOMA SCREENING Q2Y  05/28/2013    Pneumococcal 65+ years (2 of 2 - PPSV23) 09/12/2017    Influenza Age 5 to Adult  08/01/2019         Please order/place referral if appropriate. Advance Directive:  1. Do you have an advance directive in place? Patient Reply: no    2. If not, would you like material regarding how to put one in place? Patient Reply: no    Coordination of Care:  1. Have you been to the ER, urgent care clinic since your last visit? Hospitalized since your last visit? no    2. Have you seen or consulted any other health care providers outside of the [address] [place] [address] since your last visit?  Include any pap smears or colon screening.  no

## 2019-08-19 NOTE — PROGRESS NOTES
The patient presents to the office today with the chief complaint of hypertension    HPI    The patient remains on clonidine, carvedilol, amlodipine for hypertension. The patient takes amlodipine in the morning, the patient takes carvedilol in the Clonopin both in the morning and at bedtime. The patient finds that that she starts the morning with systolic blood pressures in the 1 70-1 80 range. The patient finds that approximately 2 hours later if her systolic blood pressure is 1 20-1 30 range. This is associated with dizziness. But then as the morning moves along in the early effort the patient then feels better. The patient and finds her blood pressure stays steady in the 8/05/2474 systolic range. ROS  Review of systems is positive for dizziness as above  Negative for chest pain, shortness of breath, lower extremity edema    Allergies   Allergen Reactions    Morphine Unknown (comments)     Hallucinations  Pt. Denies allergy         Current Outpatient Medications   Medication Sig Dispense Refill    carvedilol (COREG) 12.5 mg tablet TAKE 1 TABLET BY MOUTH TWICE DAILY WITH MEALS 180 Tab 0    oxyCODONE-acetaminophen (PERCOCET 10)  mg per tablet Take 1 Tab by mouth two (2) times daily as needed for Pain (75 tabs to be budgeted over 30 days) for up to 30 days. Max Daily Amount: 2 Tabs. 75 Tab 0    cloNIDine HCl (CATAPRES) 0.1 mg tablet TAKE 1 TABLET BY MOUTH TWICE DAILY 180 Tab 0    amLODIPine (NORVASC) 5 mg tablet TAKE 1 TABLET BY MOUTH DAILY 90 Tab 0    omeprazole (PRILOSEC OTC) 20 mg tablet Take 20 mg by mouth daily.  dicyclomine (BENTYL) 10 mg capsule Take 10 mg by mouth 4 times daily (before meals and nightly).  ARIPiprazole (ABILIFY) 10 mg tablet Take 1/2 to 1 tablet by mouth daily as directed      l-methylfolate (DEPLIN) 15 mg tablet Take 15 mg by mouth daily.  colestipol (COLESTID) 1 gram tablet Take 1 g by mouth two (2) times a day.       sertraline (ZOLOFT) 100 mg tablet Take 200 mg by mouth daily.          Past Medical History:   Diagnosis Date    Abdominal pain     Benign essential hypertension     Cancer (HCC)     uterine    Depressive disorder     Fatigue     Hypertension     Insomnia     NATHANAEL (obstructive sleep apnea)     does not use cpap machine    Osteoarthritis     Peptic ulcer 2012    Spinal stenosis     with chronic pain       Past Surgical History:   Procedure Laterality Date    HX APPENDECTOMY  1962    HX BACK SURGERY  1986    PHLD; L4-L5    HX GASTRIC BYPASS  2009    HX HYSTERECTOMY  1983    uterine cancer, stage 1    HX KNEE REPLACEMENT  2005    right    HX VISHAL AND BSO         Social History     Socioeconomic History    Marital status:      Spouse name: Not on file    Number of children: Not on file    Years of education: Not on file    Highest education level: Not on file   Occupational History    Not on file   Social Needs    Financial resource strain: Not on file    Food insecurity:     Worry: Not on file     Inability: Not on file    Transportation needs:     Medical: Not on file     Non-medical: Not on file   Tobacco Use    Smoking status: Current Every Day Smoker     Packs/day: 0.50     Types: Cigarettes    Smokeless tobacco: Never Used   Substance and Sexual Activity    Alcohol use: Yes     Comment: socially    Drug use: No     Comment: denies    Sexual activity: Never     Partners: Male   Lifestyle    Physical activity:     Days per week: Not on file     Minutes per session: Not on file    Stress: Not on file   Relationships    Social connections:     Talks on phone: Not on file     Gets together: Not on file     Attends Restorationism service: Not on file     Active member of club or organization: Not on file     Attends meetings of clubs or organizations: Not on file     Relationship status: Not on file    Intimate partner violence:     Fear of current or ex partner: Not on file     Emotionally abused: Not on file Physically abused: Not on file     Forced sexual activity: Not on file   Other Topics Concern    Not on file   Social History Narrative    Not on file       Patient does not have an advanced directive on file    Visit Vitals  /60   Pulse 66   Temp 97.5 °F (36.4 °C) (Oral)   Resp 12   Ht 5' (1.524 m)   Wt 144 lb 9.6 oz (65.6 kg)   SpO2 99%   BMI 28.24 kg/m²       Physical Exam  No Cervical Lymphadenopathy  No Supraclavicular Lymphadenopathy  Thyroid is Normal  Lungs are normal to percussion. Clear to auscultation   Heart:  S1 S2 are normal, No gallops, No murmurs  No Carotid Bruits  Abdomen:  Normal Bowel Sounds. No tenderness. No masses. No Hepatomegaly or Splenomegaly  LE:  Strong Pedal Pulses. No Edema      BMI: Okay    Office Visit on 06/28/2019   Component Date Value Ref Range Status    AMPHETAMINES UR POC 06/28/2019 Negative   Final    COCAINE UR POC 06/28/2019 Negative   Final    METHADONE UR POC 06/28/2019 Negative   Final    OPIATES UR POC 06/28/2019 Negative   Final    OXYCODONE UR POC 06/28/2019 Presumptive Positive   Final    BENZODIAZEPINES UR POC 06/28/2019 Negative   Final    CANNABINOIDS UR POC 06/28/2019 Negative   Final    BUPRENORPHINE UR POC 06/28/2019 Negative   Final       .No results found for any visits on 08/19/19. Assessment / Plan      ICD-10-CM ICD-9-CM    1. Benign essential hypertension I10 401.1    2. Dizziness R42 780.4        We will adjust the medication such that the patient takes clonidine at bedtime only. We will move the dosing of amlodipine to the evening, will continue carvedilol at 1 twice per day. Follow-up and Dispositions    · Return in about 3 months (around 11/19/2019). I asked Ramonita Dumont if she has any questions and I answered the questions. Ramonita Singleton Vimalmikael Dumont states that she understands the treatment plan and agrees with the treatment plan          THIS DOCUMENT WAS CREATED WITH A VOICE ACTIVATED DICTATION SYSTEM.   IT MAY CONTAIN TRANSCRIPTION ERRORS

## 2019-08-20 NOTE — TELEPHONE ENCOUNTER
[facility] approved PA for their Gabapentin 100 mg capsule. Approval is valid from 19 to 20. I then called the patient, Patient identified using two patient identifiers (name and ). I informed the patient that their Gabapentin was approved, but if they have any problems to call me back. Patient verbalized understanding.

## 2019-09-05 DIAGNOSIS — G89.29 CHRONIC HIP PAIN, BILATERAL: ICD-10-CM

## 2019-09-05 DIAGNOSIS — M51.37 DEGENERATION OF LUMBAR OR LUMBOSACRAL INTERVERTEBRAL DISC: ICD-10-CM

## 2019-09-05 DIAGNOSIS — M25.551 CHRONIC HIP PAIN, BILATERAL: ICD-10-CM

## 2019-09-05 DIAGNOSIS — M54.50 CHRONIC LEFT-SIDED LOW BACK PAIN WITHOUT SCIATICA: ICD-10-CM

## 2019-09-05 DIAGNOSIS — M25.552 CHRONIC HIP PAIN, BILATERAL: ICD-10-CM

## 2019-09-05 DIAGNOSIS — M96.1 POSTLAMINECTOMY SYNDROME, LUMBAR REGION: ICD-10-CM

## 2019-09-05 DIAGNOSIS — G89.29 CHRONIC LEFT-SIDED LOW BACK PAIN WITHOUT SCIATICA: ICD-10-CM

## 2019-09-05 DIAGNOSIS — M54.2 NECK PAIN: ICD-10-CM

## 2019-09-05 DIAGNOSIS — M70.62 TROCHANTERIC BURSITIS OF LEFT HIP: ICD-10-CM

## 2019-09-05 DIAGNOSIS — G89.4 CHRONIC PAIN SYNDROME: ICD-10-CM

## 2019-09-05 NOTE — TELEPHONE ENCOUNTER
Carlos Or has called requesting a refill of their controlled medication, Percocet, for the management of chronic pain. Last office visit date: 7/19/19  Last opioid care agreement 1/7/19  Last UDS was done 6/28/19    Date last  was pulled and reviewed : 9/5/19  Last fill date for medication was 8/9/19    Was the patient compliant when the above report was pulled? yes    Analgesia: 80%    Aberrancies: none    ADL's: yes    Adverse Reaction: none    Provider's last note and plan of care reviewed? yes  Request forwarded to provider for review.

## 2019-09-06 RX ORDER — OXYCODONE AND ACETAMINOPHEN 10; 325 MG/1; MG/1
1 TABLET ORAL
Qty: 70 TAB | Refills: 0 | Status: SHIPPED | OUTPATIENT
Start: 2019-09-06 | End: 2019-10-06
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
## 2019-09-10 ENCOUNTER — OFFICE VISIT (OUTPATIENT)
Dept: PAIN MANAGEMENT | Age: 69
End: 2019-09-10

## 2019-09-10 VITALS
HEART RATE: 65 BPM | RESPIRATION RATE: 14 BRPM | SYSTOLIC BLOOD PRESSURE: 145 MMHG | OXYGEN SATURATION: 98 % | TEMPERATURE: 98.1 F | BODY MASS INDEX: 28.27 KG/M2 | WEIGHT: 144 LBS | HEIGHT: 60 IN | DIASTOLIC BLOOD PRESSURE: 77 MMHG

## 2019-09-10 DIAGNOSIS — Z79.899 ENCOUNTER FOR LONG-TERM (CURRENT) USE OF MEDICATIONS: ICD-10-CM

## 2019-09-10 DIAGNOSIS — G89.4 CHRONIC PAIN SYNDROME: ICD-10-CM

## 2019-09-10 DIAGNOSIS — M25.552 CHRONIC HIP PAIN, BILATERAL: ICD-10-CM

## 2019-09-10 DIAGNOSIS — M54.2 NECK PAIN: ICD-10-CM

## 2019-09-10 DIAGNOSIS — M25.551 CHRONIC HIP PAIN, BILATERAL: ICD-10-CM

## 2019-09-10 DIAGNOSIS — M54.50 CHRONIC LEFT-SIDED LOW BACK PAIN WITHOUT SCIATICA: Primary | ICD-10-CM

## 2019-09-10 DIAGNOSIS — M51.37 DEGENERATION OF LUMBAR OR LUMBOSACRAL INTERVERTEBRAL DISC: ICD-10-CM

## 2019-09-10 DIAGNOSIS — M70.62 TROCHANTERIC BURSITIS OF LEFT HIP: ICD-10-CM

## 2019-09-10 DIAGNOSIS — M96.1 POSTLAMINECTOMY SYNDROME, LUMBAR REGION: ICD-10-CM

## 2019-09-10 DIAGNOSIS — G89.29 CHRONIC HIP PAIN, BILATERAL: ICD-10-CM

## 2019-09-10 DIAGNOSIS — G89.29 CHRONIC LEFT-SIDED LOW BACK PAIN WITHOUT SCIATICA: Primary | ICD-10-CM

## 2019-09-10 RX ORDER — DOCUSATE SODIUM 100 MG/1
100 CAPSULE, LIQUID FILLED ORAL
Qty: 60 CAP | Refills: 2 | Status: SHIPPED | OUTPATIENT
Start: 2019-09-10

## 2019-09-10 RX ORDER — SENNOSIDES 8.6 MG/1
1 TABLET ORAL
Qty: 30 TAB | Refills: 2 | Status: ON HOLD | OUTPATIENT
Start: 2019-09-10 | End: 2020-02-17

## 2019-09-10 RX ORDER — GABAPENTIN 400 MG/1
400 CAPSULE ORAL
Qty: 30 CAP | Refills: 2 | Status: SHIPPED | OUTPATIENT
Start: 2019-09-10 | End: 2019-10-10

## 2019-09-10 NOTE — PROGRESS NOTES
Nursing Notes    Patient presents to the office today in follow-up. Patient rates her pain at 7/10 on the numerical pain scale. Reviewed medications with counts as follows:    Rx Date filled Qty Dispensed Pill Count Last Dose Short   Percocet 10 mg 08/09/19 75 15.5 This am  no        reviewed YES  Any aberrancies noted on  NO  Last opioid agreement 01/17/19  Last urine drug screen 06/28/19    Comments:  Patient is here today for a follow up appt today for her chronic pain. She states her pain level today is a 7  Patient states she has seen her pcm and [facility] for a sinus infection and her ears were cleaned as well. She informed me that she has transferred her care to Dr Tanner Armendariz. She states her appt is 10/10  Patient will need a new Rx for Gabapentin     POC UDS was not performed in office today    Any new labs or imaging since last appointment? NO    Have you been to an emergency room (ER) or urgent care clinic since your last visit? YES            Have you been hospitalized since your last visit? NO     If yes, where, when, and reason for visit? Have you seen or consulted any other health care providers outside of the [address] [place] [address]  since your last visit? YES     If yes, where, when, and reason for visit? Ms. Jennifer Champion has a reminder for a \"due or due soon\" health maintenance. I have asked that she contact her primary care provider for follow-up on this health maintenance.

## 2019-09-10 NOTE — PROGRESS NOTES
Referral date 8/4/2009, source Dr Carrie Flores and for neck and back pain. Calculated MEQ - 45  Naloxone rescue - yes  Prophylactic bowel program - ordered today  Date of last OCA 1/7/19  Prior UDS 6/28/19, consistent POC, pending confirmatory testing  Last UDS 1/7/19, consistent Ethyl glucuronide 29,362ng/ml, Ethyl sulfate 6,135ng/ml.  date checked today, findings consistent    Today   Last Visit Prior Visit(s)  [facility] - 4 & 5  5 & 5  1 & 7  5 & 6 6 & 6 6 & 4  LYN - 48%  44%  60%  47% 42% 46%  COMM - 5  5  6  6  4 6      HPI:  Ingrid Kenney is a 68 y.o. female here for f/u visit for ongoing evaluation of  chronic back and bilateral hip pain. S/P L4/5 fusion. Pt was last seen here on 7/19/19. Pt denies interval changes on the character or distribution of pain. Pain is located along cervical spine described as stabbing, along thoracic spine described as aching and along lumbar spine described as stabbing. Pain is also located to left hip and left knee. Pain at its best is 4/10. Pain at its worse is 9/10. The pain is worsened by cold rainy weather, lifting heavy objects, vacuuming, prolonged walking, prolonged sitting and prolonged standing. She states TENS unit use, massage therapy and chiropractor therapy worsened her pain. Symptoms are improved by Tylenol, Percocet, topical Bengay, topical Aspercreme, heat and PT for hip & back. Pt has tried compound cream, capsaicin cream, ice and epidural steroid injections with no perceived benefit. Elavil in the past, not sure if it was for pain. She has never tried aquatic PT, acupuncture, yoga, SCS trial, implantable pain pump, Cymbalta, Topamax or Lyrica. Since last visit, she notices benefit from taking Gabapentin 300mg QHS but she is unable to take during the day due to it making her sleepy and tired.  She states this will be her last appointment with [facility] as she is transferring her care to Dr Josefa Borden in [place] closer to her home; her appt is on 10/10/19. Interventional Pain Procedures  6/28/19 - left greater trochanter bursa injection with Dr Tushar Trujillo stating it did not provide any relief       Social History     Socioeconomic History    Marital status:      Spouse name: Not on file    Number of children: Not on file    Years of education: Not on file    Highest education level: Not on file   Occupational History    Not on file   Social Needs    Financial resource strain: Not on file    Food insecurity:     Worry: Not on file     Inability: Not on file    Transportation needs:     Medical: Not on file     Non-medical: Not on file   Tobacco Use    Smoking status: Current Every Day Smoker     Packs/day: 0.50     Types: Cigarettes    Smokeless tobacco: Never Used   Substance and Sexual Activity    Alcohol use: Yes     Comment: socially    Drug use: No     Comment: denies    Sexual activity: Never     Partners: Male   Lifestyle    Physical activity:     Days per week: Not on file     Minutes per session: Not on file    Stress: Not on file   Relationships    Social connections:     Talks on phone: Not on file     Gets together: Not on file     Attends Jewish service: Not on file     Active member of club or organization: Not on file     Attends meetings of clubs or organizations: Not on file     Relationship status: Not on file    Intimate partner violence:     Fear of current or ex partner: Not on file     Emotionally abused: Not on file     Physically abused: Not on file     Forced sexual activity: Not on file   Other Topics Concern    Not on file   Social History Narrative    Not on file     Family History   Problem Relation Age of Onset    Cancer Mother         lung-nonsmoker    Diabetes Other     Heart Disease Other      Allergies   Allergen Reactions    Morphine Unknown (comments)     Hallucinations  Pt.  Denies allergy       Past Medical History:   Diagnosis Date    Abdominal pain     Benign essential hypertension     Cancer ([facility] [address]     uterine    Depressive disorder     Fatigue     Hypertension     Insomnia     NATHANAEL (obstructive sleep apnea)     does not use cpap machine    Osteoarthritis     Peptic ulcer 2012    Spinal stenosis     with chronic pain     Past Surgical History:   Procedure Laterality Date    HX APPENDECTOMY  1962    HX BACK SURGERY  1986    PHLD; L4-L5    HX GASTRIC BYPASS  2009    HX HYSTERECTOMY  1983    uterine cancer, stage 1    HX KNEE REPLACEMENT  2005    right    HX VISHAL AND BSO       Current Outpatient Medications on File Prior to Visit   Medication Sig    oxyCODONE-acetaminophen (PERCOCET 10)  mg per tablet Take 1 Tab by mouth every eight (8) hours as needed for Pain (70 tabs to be budgeted over 30 days) for up to 30 days. Max Daily Amount: 3 Tabs.  carvedilol (COREG) 12.5 mg tablet TAKE 1 TABLET BY MOUTH TWICE DAILY WITH MEALS    cloNIDine HCl (CATAPRES) 0.1 mg tablet TAKE 1 TABLET BY MOUTH TWICE DAILY    amLODIPine (NORVASC) 5 mg tablet TAKE 1 TABLET BY MOUTH DAILY    omeprazole (PRILOSEC OTC) 20 mg tablet Take 20 mg by mouth daily.  dicyclomine (BENTYL) 10 mg capsule Take 10 mg by mouth 4 times daily (before meals and nightly).  ARIPiprazole (ABILIFY) 10 mg tablet Take 1/2 to 1 tablet by mouth daily as directed    l-methylfolate (DEPLIN) 15 mg tablet Take 15 mg by mouth daily.  colestipol (COLESTID) 1 gram tablet Take 1 g by mouth two (2) times a day.  sertraline (ZOLOFT) 100 mg tablet Take 200 mg by mouth daily. No current facility-administered medications on file prior to visit. ROS:  Reports constipation and bladder incontinence. Denies fever, chills, nausea, vomiting, diarrhea, chest pain, shortness or breath/trouble breathing, abdominal pain, weakness, trouble swallowing, changes in vision, changes in hearing, falls, dizziness, bowel incontinence, depression, anxiety, suicidal ideations, homicidal ideations or alcohol use.     Opioid specific risk: depression, obstructive sleep apnea, history of gastric bypass surgery, intermittent alcohol use, insomnia, tobacco use, constipation      Vitals:    09/10/19 1346   BP: 145/77   Pulse: 65   Resp: 14   Temp: 98.1 °F (36.7 °C)   SpO2: 98%   Weight: 65.3 kg (144 lb)   Height: 5' (1.524 m)   PainSc:   7   PainLoc: Back          Physical exam:  AFVSS, no acute distress, normal body habitus. A&OXs 3. Normocephalic, atraumatic. Conjugate gaze, clear sclerae. Speech is clear and appropriate. Mood is appropriate and patient is cooperative. Gait and balance are within functional limits. Non-labored breathing. Primary Care Physician  Samantha Potts  [address] [id number]  [phone]      [place] [address] -- .  3 most recent [address] [facility] 7/19/2019   PHQ Not Done -   Little interest or pleasure in doing things Several days   Feeling down, depressed, irritable, or hopeless Several days   Total Score PHQ 2 2   Trouble falling or staying asleep, or sleeping too much Not at all   Feeling tired or having little energy Several days   Poor appetite, weight loss, or overeating Not at all   Feeling bad about yourself - or that you are a failure or have let yourself or your family down Not at all   Trouble concentrating on things such as school, work, reading, or watching TV Not at all   Moving or speaking so slowly that other people could have noticed; or the opposite being so fidgety that others notice Not at all   Thoughts of being better off dead, or hurting yourself in some way Not at all   PHQ 9 Score 3   How difficult have these problems made it for you to do your work, take care of your home and get along with others Not difficult at all         Assessment/Plan:     ICD-10-CM ICD-9-CM    1. Chronic left-sided low back pain without sciatica M54.5 724.2 gabapentin (NEURONTIN) 400 mg capsule    G89.29 338.29    2.  Degeneration of lumbar or lumbosacral intervertebral disc M51.37 722.52 gabapentin (NEURONTIN) 400 mg capsule   3. Postlaminectomy syndrome, lumbar region M96.1 722.83 gabapentin (NEURONTIN) 400 mg capsule   4. Chronic hip pain, bilateral M25.551 719.45 gabapentin (NEURONTIN) 400 mg capsule    M25.552 338.29     G89.29     5. Trochanteric bursitis of left hip M70.62 726.5 gabapentin (NEURONTIN) 400 mg capsule   6. Neck pain M54.2 723.1 gabapentin (NEURONTIN) 400 mg capsule   7. Chronic pain syndrome G89.4 338.4 gabapentin (NEURONTIN) 400 mg capsule   8. Encounter for long-term (current) use of medications Z79.899 V58.69 senna (SENNA) 8.6 mg tablet      docusate sodium (COLACE) 100 mg capsule        1) Medications (opiod and non-opiod)  --I do not recommend long term opioid therapy for this patient at this time for their chronic pain; the risks outweigh the potential benefits. Pt currently taking Percocet 10/325mg up to 3 times a day as needed with a total of 75 tabs to be budgeted over 30 days. Their MME is 39. --Today, we will continue the weaning of patients opioid medication with a goal of being opioid free, pending safety and compliance. Pt was educated on signs and symptoms of opioid withdrawal and advised to call the clinic should these symptoms arise so that we may provide support as needed. She was provided with pre-printed script for Percocet 10/325mg up to 3 times a day as needed with a total of 70 tabs to be budgeted over 30 days. --Pt instructed to call 5-7 days before they run out of their medications for refill. --At next office visit, the plan is to provide patient with Percocet 10/325mg up to 3 times a day as needed with a total of 65 tabs to be budgeted over 30 days. If patient has difficulty with the wean or difficulty with cravings we will consider referral to mental health for ongoing assessment and treatment for opioid use disorder. --Will increase Gabapentin to 400mg QHS. --No efficacy from capsaicin cream.  --Senna and Colace ordered PRN constipation.     2) Restorative Therapies  --Pt may benefit from aquatic PT, acupuncture therapy and/or yoga therapy in the future. 3) Interventional Procedures  --Consider interventional pain procedures with Dr Indigo Guajardo in the future. 4) Behavorial Health Approaches  --Pt would benefit from referral to pain psychology for training and cognitive behavorial therapy, acceptance commitment therapy, biofeedback and mindfulness mediation and other modalities as indicated for treatment of chronic pain once this service is available at our clinic or by referral to Dr Jose L Villatoro in the future. 5) Complementary & Integrative Health  --Pt to discuss new painful areas and aggressive cessation strategies with her PCP as previously recommended. --Follow up ongoing assessment and ongoing development of integrative and comprehensive plan of care for chronic pain. --Discussed with her to make her 3 month appointment with us that she can cancel at any time and once she has been seen at Dr Jordana Taylor office. Goals: To establish complementary and integrative plan of care to address chronic pain issues while minimizing pharmaceuticals to maximize patient's function improve quality of life. Education:  Patient again educated on the importance of strict compliance with the opioid care agreement while on opioid therapy. Patient also again educated that they should avoid driving while on chronic opioid therapy. Also advised to avoid alcohol and to avoid benzodiazepines while on opioid therapy. Handouts given regarding opioid safety. Follow-up and Dispositions    · Return in about 3 months (around 12/10/2019) for 30 min. [address] was used for portions of this report. Unintended errors may occur.

## 2019-09-10 NOTE — PATIENT INSTRUCTIONS

## 2019-09-28 DIAGNOSIS — I10 BENIGN ESSENTIAL HYPERTENSION: ICD-10-CM

## 2019-09-30 RX ORDER — CLONIDINE HYDROCHLORIDE 0.1 MG/1
TABLET ORAL
Qty: 180 TAB | Refills: 0 | Status: SHIPPED | OUTPATIENT
Start: 2019-09-30 | End: 2019-12-31

## 2019-10-01 ENCOUNTER — HOSPITAL ENCOUNTER (OUTPATIENT)
Dept: LAB | Age: 69
Discharge: HOME | End: 2019-10-01
Payer: MEDICARE

## 2019-10-01 DIAGNOSIS — I10 BENIGN ESSENTIAL HYPERTENSION: ICD-10-CM

## 2019-10-01 DIAGNOSIS — Z11.59 ENCOUNTER FOR HEPATITIS C SCREENING TEST FOR LOW RISK PATIENT: ICD-10-CM

## 2019-10-01 LAB
ALBUMIN SERPL-MCNC: 3.7 G/DL (ref 3.4–5)
ALBUMIN/GLOB SERPL: 1.2 {RATIO} (ref 0.8–1.7)
ALP SERPL-CCNC: 94 U/L (ref 45–117)
ALT SERPL-CCNC: 16 U/L (ref 13–56)
ANION GAP SERPL CALC-SCNC: 4 MMOL/L (ref 3–18)
AST SERPL-CCNC: 18 U/L (ref 10–38)
BASOPHILS # BLD: 0 K/UL (ref 0–0.1)
BASOPHILS NFR BLD: 0 % (ref 0–2)
BILIRUB SERPL-MCNC: 0.4 MG/DL (ref 0.2–1)
BUN SERPL-MCNC: 16 MG/DL (ref 7–18)
BUN/CREAT SERPL: 22 (ref 12–20)
CALCIUM SERPL-MCNC: 8.8 MG/DL (ref 8.5–10.1)
CHLORIDE SERPL-SCNC: 106 MMOL/L (ref 100–111)
CO2 SERPL-SCNC: 32 MMOL/L (ref 21–32)
CREAT SERPL-MCNC: 0.73 MG/DL (ref 0.6–1.3)
DIFFERENTIAL METHOD BLD: NORMAL
EOSINOPHIL # BLD: 0.3 K/UL (ref 0–0.4)
EOSINOPHIL NFR BLD: 4 % (ref 0–5)
ERYTHROCYTE [DISTWIDTH] IN BLOOD BY AUTOMATED COUNT: 14.4 % (ref 11.6–14.5)
GLOBULIN SER CALC-MCNC: 3.1 G/DL (ref 2–4)
GLUCOSE SERPL-MCNC: 123 MG/DL (ref 74–99)
HCT VFR BLD AUTO: 42.3 % (ref 35–45)
HGB BLD-MCNC: 13.4 G/DL (ref 12–16)
LYMPHOCYTES # BLD: 2.1 K/UL (ref 0.9–3.6)
LYMPHOCYTES NFR BLD: 30 % (ref 21–52)
MCH RBC QN AUTO: 30.5 PG (ref 24–34)
MCHC RBC AUTO-ENTMCNC: 31.7 G/DL (ref 31–37)
MCV RBC AUTO: 96.4 FL (ref 74–97)
MONOCYTES # BLD: 0.5 K/UL (ref 0.05–1.2)
MONOCYTES NFR BLD: 7 % (ref 3–10)
NEUTS SEG # BLD: 4.1 K/UL (ref 1.8–8)
NEUTS SEG NFR BLD: 59 % (ref 40–73)
PLATELET # BLD AUTO: 213 K/UL (ref 135–420)
PMV BLD AUTO: 11 FL (ref 9.2–11.8)
POTASSIUM SERPL-SCNC: 4 MMOL/L (ref 3.5–5.5)
PROT SERPL-MCNC: 6.8 G/DL (ref 6.4–8.2)
RBC # BLD AUTO: 4.39 M/UL (ref 4.2–5.3)
SODIUM SERPL-SCNC: 142 MMOL/L (ref 136–145)
WBC # BLD AUTO: 7.1 K/UL (ref 4.6–13.2)

## 2019-10-01 PROCEDURE — 85025 COMPLETE CBC W/AUTO DIFF WBC: CPT

## 2019-10-01 PROCEDURE — 86803 HEPATITIS C AB TEST: CPT

## 2019-10-01 PROCEDURE — 80061 LIPID PANEL: CPT

## 2019-10-01 PROCEDURE — 80053 COMPREHEN METABOLIC PANEL: CPT

## 2019-10-01 PROCEDURE — 36415 COLL VENOUS BLD VENIPUNCTURE: CPT

## 2019-10-02 LAB
CHOLEST SERPL-MCNC: 219 MG/DL
HCV AB SER IA-ACNC: 0.05 INDEX
HCV AB SERPL QL IA: NEGATIVE
HCV COMMENT,HCGAC: NORMAL
HDLC SERPL-MCNC: 75 MG/DL (ref 40–60)
HDLC SERPL: 2.9 {RATIO} (ref 0–5)
LDLC SERPL CALC-MCNC: 116.8 MG/DL (ref 0–100)
LIPID PROFILE,FLP: ABNORMAL
TRIGL SERPL-MCNC: 136 MG/DL (ref ?–150)
VLDLC SERPL CALC-MCNC: 27.2 MG/DL

## 2019-10-09 ENCOUNTER — OFFICE VISIT (OUTPATIENT)
Dept: ORTHOPEDIC SURGERY | Age: 69
End: 2019-10-09

## 2019-10-09 VITALS
WEIGHT: 144 LBS | DIASTOLIC BLOOD PRESSURE: 88 MMHG | HEIGHT: 60 IN | BODY MASS INDEX: 28.27 KG/M2 | HEART RATE: 71 BPM | OXYGEN SATURATION: 99 % | TEMPERATURE: 99 F | SYSTOLIC BLOOD PRESSURE: 190 MMHG

## 2019-10-09 DIAGNOSIS — R94.131 ABNORMAL EMG: ICD-10-CM

## 2019-10-09 DIAGNOSIS — R20.2 NUMBNESS AND TINGLING IN BOTH HANDS: Primary | ICD-10-CM

## 2019-10-09 DIAGNOSIS — R20.0 NUMBNESS AND TINGLING IN BOTH HANDS: Primary | ICD-10-CM

## 2019-10-09 NOTE — LETTER
10/9/19 Patient: Adis Copeland YOB: 1950 Date of Visit: 10/9/2019 Francoise Phoenix MD 
[address] [facility] [address] [facility] [address] VIA In Basket Jaclyn Deras, [address] [facility] [address] [id number] [address] [phone] VIA In Basket Dear MD Jaclyn Mayes, DO, Thank you for referring Ms. Pari Mendoza to [facility] for evaluation. My notes for this consultation are attached. If you have questions, please do not hesitate to call me. I look forward to following your patient along with you.  
 
 
Sincerely, 
 
Concha Lna MD

## 2019-10-09 NOTE — PROGRESS NOTES
[address]  [address], [address]  [address]  Phone: [phone]  Fax: [phone]        Dawson Villagomez  : 1950  PCP: Mckay Solo MD  10/9/2019    ELECTROMYOGRAPHY AND NERVE CONDUCTION STUDIES    Aurelio Castellon was referred by Dr. Evi Colon for electrodiagnostic evaluation of bilateral hand numbness and tingling. NCV & EMG Findings:  Evaluation of the left median motor nerve showed prolonged distal onset latency (4.3 ms), reduced amplitude (2.8 mV), and decreased conduction velocity (Elbow-Wrist, 45 m/s). The right median motor nerve showed reduced amplitude (2.8 mV). The left median sensory nerve showed no response (Wrist) and no response (Elbow). The right median sensory nerve showed no response (Wrist). All remaining nerves (as indicated in the following tables) were within normal limits. All left vs. right side differences were within normal limits. Needle evaluation of the right abductor pollicis brevis muscle showed increased insertional activity, moderately increased spontaneous activity, and increased motor unit amplitude. All remaining muscles (as indicated in the following table) showed no evidence of electrical instability. INTERPRETATION  This is an abnormal electrodiagnostic examination. These findings may be consistent with:  1. Moderate median mononeuropathy at the left wrist (carpal tunnel syndrome)  2. Chronic mild median mononeuropathy at the right wrist (carpal tunnel syndrome) - previously treated    There is no electrodiagnostic evidence of any cervical radiculopathy, brachial plexopathy, peripheral polyneuropathy, or any other mononeuropathy. CLINICAL INTERPRETATION  Her electrodiagnostic findings are consistent with symptoms in her hands. HISTORY OF PRESENT ILLNESS  Aurelio Castellon is a 68 y.o. female. Pt presents today for BUE EMG evaluation of bilateral hand numbness and tingling. She reports that she has had symptoms in the right hand for at least 10 years, but she was unable to take time off of work to proceed with treatment, and her symptoms have been more progressive over the last 1-1.5 years. She has symptoms on the left side, but to a lesser degree. Pt notes that the numbness and tingling in her right hand tends to radiate up towards the elbow. Pt reports dropping things, and she has difficulty with crocheting and knitting. She worked as a ; she has been retired for about 1 year. She has been wearing a wrist brace at night. PAST MEDICAL HISTORY   Past Medical History:   Diagnosis Date    Abdominal pain     Benign essential hypertension     Cancer ([facility] [address]     uterine    Depressive disorder     Fatigue     Hypertension     Insomnia     NATHANAEL (obstructive sleep apnea)     does not use cpap machine    Osteoarthritis     Peptic ulcer 2012    Spinal stenosis     with chronic pain       Past Surgical History:   Procedure Laterality Date    HX APPENDECTOMY  1962    HX BACK SURGERY  1986    PHLD; L4-L5    HX GASTRIC BYPASS  2009    HX HYSTERECTOMY  1983    uterine cancer, stage 1    HX KNEE REPLACEMENT  2005    right    HX VISHAL AND BSO     . MEDICATIONS    Current Outpatient Medications   Medication Sig Dispense Refill    cloNIDine HCl (CATAPRES) 0.1 mg tablet TAKE 1 TABLET BY MOUTH TWICE DAILY 180 Tab 0    gabapentin (NEURONTIN) 400 mg capsule Take 1 Cap by mouth nightly for 30 days. Max Daily Amount: 400 mg. 30 Cap 2    senna (SENNA) 8.6 mg tablet Take 1 Tab by mouth daily as needed for Constipation. 30 Tab 2    docusate sodium (COLACE) 100 mg capsule Take 1 Cap by mouth two (2) times daily as needed for Constipation.  60 Cap 2    carvedilol (COREG) 12.5 mg tablet TAKE 1 TABLET BY MOUTH TWICE DAILY WITH MEALS 180 Tab 0    amLODIPine (NORVASC) 5 mg tablet TAKE 1 TABLET BY MOUTH DAILY 90 Tab 0    omeprazole (PRILOSEC OTC) 20 mg tablet Take 20 mg by mouth daily.  dicyclomine (BENTYL) 10 mg capsule Take 10 mg by mouth 4 times daily (before meals and nightly).  ARIPiprazole (ABILIFY) 10 mg tablet Take 1/2 to 1 tablet by mouth daily as directed      l-methylfolate (DEPLIN) 15 mg tablet Take 15 mg by mouth daily.  colestipol (COLESTID) 1 gram tablet Take 1 g by mouth two (2) times a day.  sertraline (ZOLOFT) 100 mg tablet Take 200 mg by mouth daily. ALLERGIES  Allergies   Allergen Reactions    Morphine Unknown (comments)     Hallucinations  Pt. Denies allergy            SOCIAL HISTORY    Social History     Socioeconomic History    Marital status:      Spouse name: Not on file    Number of children: Not on file    Years of education: Not on file    Highest education level: Not on file   Tobacco Use    Smoking status: Current Every Day Smoker     Packs/day: 0.50     Types: Cigarettes    Smokeless tobacco: Never Used   Substance and Sexual Activity    Alcohol use: Yes     Comment: socially    Drug use: No     Comment: denies    Sexual activity: Never     Partners: Male       FAMILY HISTORY  Family History   Problem Relation Age of Onset    Cancer Mother         lung-nonsmoker    Diabetes Other     Heart Disease Other          PHYSICAL EXAMINATION  Visit Vitals  /88 (BP 1 Location: Left arm, BP Patient Position: Sitting)   Pulse 71   Temp 99 °F (37.2 °C) (Oral)   Ht 5' (1.524 m)   Wt 144 lb (65.3 kg)   SpO2 99%   BMI 28.12 kg/m²       Pain Assessment  7/17/2019   Location of Pain Wrist   Location Modifiers Right   Severity of Pain 10   Quality of Pain Burning;Aching   Duration of Pain Persistent   Frequency of Pain Constant   Aggravating Factors Other (Comment)   Aggravating Factors Comment picking up objects, driving, writing   Limiting Behavior Yes   Relieving Factors Rest   Result of Injury No           Constitutional:  Well developed, well nourished, in no acute distress.    Psychiatric: Affect and mood are appropriate. Integumentary: No rashes or abrasions noted on exposed areas. SPINE/MUSCULOSKELETAL EXAM    On brief examination: + carpal compress, carpal tinel, and Phalen's bilaterally.     MOTOR:      Biceps  Triceps Deltoids Wrist Ext Wrist Flex Hand Intrin   Right 5/5 5/5 5/5 5/5 5/5 5/5   Left 5/5 5/5 5/5 5/5 5/5 5/5             Hip Flex  Quads Hamstrings Ankle DF EHL Ankle PF   Right 5/5 5/5 5/5 5/5 5/5 5/5   Left 5/5 5/5 5/5 5/5 5/5 5/5     NCV & EMG Findings:  Nerve Conduction Studies  Anti Sensory Summary Table     Stim Site NR Peak (ms) Norm Peak (ms) O-P Amp (µV) Norm O-P Amp Site1 Site2 Delta-P (ms) Dist (cm) Eriberto (m/s) Norm Eriberto (m/s)   Left Median Anti Sensory (2nd Digit)   Wrist NR  <3.6  >10 Wrist 2nd Digit  14.0  >39   Elbow NR     Elbow Wrist  0.0  >48   Right Median Anti Sensory (2nd Digit)   Wrist NR  <3.6  >10 Wrist 2nd Digit  14.0  >39   Left Radial Anti Sensory (Base 1st Digit)   Wrist    2.0 <3.1 44.7  Wrist Base 1st Digit 2.0 0.0     Right Radial Anti Sensory (Base 1st Digit)   Wrist    2.3 <3.1 27.3  Wrist Base 1st Digit 2.3 0.0     Left Ulnar Anti Sensory (5th Digit)   Wrist    3.1 <3.7 42.6 >15.0 Wrist 5th Digit 3.1 14.0 45 >38   Right Ulnar Anti Sensory (5th Digit)   Wrist    3.2 <3.7 44.7 >15.0 Wrist 5th Digit 3.2 14.0 44 >38     Motor Summary Table     Stim Site NR Onset (ms) Norm Onset (ms) O-P Amp (mV) Norm O-P Amp Site1 Site2 Delta-0 (ms) Dist (cm) Eriberto (m/s) Norm Eriberto (m/s)   Left Median Motor (Abd Poll Brev)   Wrist    4.3 <4.2 2.8 >5 Elbow Wrist 4.2 19.0 45 >50   Elbow    8.5  2.1          Right Median Motor (Abd Poll Brev)   Wrist    3.6 <4.2 2.8 >5 Elbow Wrist 3.8 20.0 53 >50   Elbow    7.4  2.4          Left Ulnar Motor (Abd Dig Min)   Wrist    2.7 <4.2 10.0 >3 B Elbow Wrist 2.5 15.0 60 >53   B Elbow    5.2  9.5  A Elbow B Elbow 1.8 10.0 56 >53   A Elbow    7.0  9.1          Right Ulnar Motor (Abd Dig Min)   Wrist    2.9 <4.2 7.9 >3 B Elbow Wrist 2.7 15.5 57 >53   B Elbow 5.6  7.3  A Elbow B Elbow 1.4 10.0 71 >53   A Elbow    7.0  7.8            EMG     Side Muscle Nerve Root Ins Act Fibs Psw Amp Dur Poly Recrt Int [address] Comment   Left Biceps Musculocut C5-6 Nml Nml Nml Nml Nml 0 Nml Nml    Left Triceps Radial C6-7-8 Nml Nml Nml Nml Nml 0 Nml Nml    Left PronatorTeres Median C6-7 Nml Nml Nml Nml Nml 0 Nml Nml    Left Abd Poll Brev Median C8-T1 Nml Nml Nml Nml Nml 0 Nml Nml    Left 1stDorInt Ulnar C8-T1 Nml Nml Nml Nml Nml 0 Nml Nml    Right Biceps Musculocut C5-6 Nml Nml Nml Nml Nml 0 Nml Nml    Right Triceps Radial C6-7-8 Nml Nml Nml Nml Nml 0 Nml Nml    Right PronatorTeres Median C6-7 Nml Nml Nml Nml Nml 0 Nml Nml    Right Abd Poll Brev Median C8-T1 Incr 1+ 2+ Incr Nml 0 Nml Nml    Right 1stDorInt Ulnar C8-T1 Nml Nml Nml Nml Nml 0 Nml Nml        Nerve Conduction Studies  Anti Sensory Left/Right Comparison     Stim Site L Lat (ms) R Lat (ms) L-R Lat (ms) L Amp (µV) R Amp (µV) L-R Amp (%) Site1 Site2 L Eriberto (m/s) R Eriberto (m/s) L-R Eriberto (m/s)   Median Anti Sensory (2nd Digit)   Wrist       Wrist 2nd Digit      Elbow       Elbow Wrist      Radial Anti Sensory (Base 1st Digit)   Wrist 2.0 2.3 0.3 44.7 27.3 38.9 Wrist Base 1st Digit      Ulnar Anti Sensory (5th Digit)   Wrist 3.1 3.2 0.1 42.6 44.7 4.7 Wrist 5th Digit 45 44 1     Motor Left/Right Comparison     Stim Site L Lat (ms) R Lat (ms) L-R Lat (ms) L Amp (mV) R Amp (mV) L-R Amp (%) Site1 Site2 L Eriberto (m/s) R Eriberto (m/s) L-R Eriberto (m/s)   Median Motor (Abd Poll Brev)   Wrist 4.3 3.6 0.7 2.8 2.8 0.0 Elbow Wrist 45 53 8   Elbow 8.5 7.4 1.1 2.1 2.4 12.5        Ulnar Motor (Abd Dig Min)   Wrist 2.7 2.9 0.2 10.0 7.9 21.0 B Elbow Wrist 60 57 3   B Elbow 5.2 5.6 0.4 9.5 7.3 23.2 A Elbow B Elbow 56 71 15   A Elbow 7.0 7.0 0.0 9.1 7.8 14.3              Waveforms:                                [facility]  OFFICE PROCEDURE PROGRESS NOTE        Chart reviewed for the following:   IRachelle, have reviewed the History, Physical and updated the Allergic reactions for [address] performed immediately prior to start of procedure:   I, Bard Cartagena, have performed the following reviews on Sumaya Damon prior to the start of the procedure:            * Patient was identified by name and date of birth   * Agreement on procedure being performed was verified  * Risks and Benefits explained to the patient  * Procedure site verified and marked as necessary  * Patient was positioned for comfort  * Consent was signed and verified     Time: 3:04 PM      Date of procedure: 10/9/2019    Procedure performed by:  Adarsh Goldberg MD    Provider accompanied by: Sugar. Patient accompanied by: None.     How tolerated by patient: tolerated the procedure well with no complications    Post Procedural Pain Scale: 0 - No Hurt    Comments: none    Written by Natalia Taveras, [address] as dictated by Bard Mitzi MD

## 2019-10-14 ENCOUNTER — OFFICE VISIT (OUTPATIENT)
Dept: ORTHOPEDIC SURGERY | Facility: CLINIC | Age: 69
End: 2019-10-14

## 2019-10-14 VITALS
BODY MASS INDEX: 27.68 KG/M2 | RESPIRATION RATE: 16 BRPM | TEMPERATURE: 97.9 F | WEIGHT: 141 LBS | SYSTOLIC BLOOD PRESSURE: 150 MMHG | OXYGEN SATURATION: 94 % | HEIGHT: 60 IN | DIASTOLIC BLOOD PRESSURE: 74 MMHG | HEART RATE: 67 BPM

## 2019-10-14 DIAGNOSIS — G56.03 BILATERAL CARPAL TUNNEL SYNDROME: Primary | ICD-10-CM

## 2019-10-14 NOTE — PROGRESS NOTES
Vicki Bone is a 68 y.o. female right handed individual, not currently working. Worker's Compensation and legal considerations: not known and none filed. Vitals:    10/14/19 1256   BP: 150/74   Pulse: 67   Resp: 16   Temp: 97.9 °F (36.6 °C)   TempSrc: Oral   SpO2: 94%   Weight: 141 lb (64 kg)   Height: 5' (1.524 m)   PainSc:   6           Chief Complaint   Patient presents with    Hand Pain     steven hand pain, f/u appt.  Wrist Pain     steven wrist pain, f/u appt. HPI:  Patient comes in today for EMG at her last visit she was given carpal tunnel braces to wear at night. Initial   HPI: Patient comes in today with complaints of bilateral hand numbness and tingling. The right side is been going on for some time and significantly worse. She says the left side is slowly bothering her.     Date of onset:  Indeterminate    Injury: No    Prior Treatment:  Yes: Comment: Right carpal tunnel brace    Numbness/ Tingling: Yes: Comment: Bilateral hands thumb index and middle right worse than left    ROS: Review of Systems - General ROS: negative  Respiratory ROS: no cough, shortness of breath, or wheezing  Cardiovascular ROS: no chest pain or dyspnea on exertion  Musculoskeletal ROS: positive for - pain in hand - bilateral  Neurological ROS: positive for - numbness/tingling  Dermatological ROS: negative    Past Medical History:   Diagnosis Date    Abdominal pain     Benign essential hypertension     Cancer ([facility] [address]     uterine    Depressive disorder     Fatigue     Hypertension     Insomnia     NATHANAEL (obstructive sleep apnea)     does not use cpap machine    Osteoarthritis     Peptic ulcer 2012    Spinal stenosis     with chronic pain       Past Surgical History:   Procedure Laterality Date    HX APPENDECTOMY  1962    HX BACK SURGERY  1986    PHLD; L4-L5    HX GASTRIC BYPASS  2009    HX HYSTERECTOMY  1983    uterine cancer, stage 1    HX KNEE REPLACEMENT  2005    right    HX VISHAL AND BSO Current Outpatient Medications   Medication Sig Dispense Refill    triamcinolone acetonide (KENALOG) 10 mg/mL injection 1 mL by Intra artICUlar route once for 1 dose. 1 Vial 0    cloNIDine HCl (CATAPRES) 0.1 mg tablet TAKE 1 TABLET BY MOUTH TWICE DAILY 180 Tab 0    senna (SENNA) 8.6 mg tablet Take 1 Tab by mouth daily as needed for Constipation. 30 Tab 2    docusate sodium (COLACE) 100 mg capsule Take 1 Cap by mouth two (2) times daily as needed for Constipation. 60 Cap 2    carvedilol (COREG) 12.5 mg tablet TAKE 1 TABLET BY MOUTH TWICE DAILY WITH MEALS 180 Tab 0    amLODIPine (NORVASC) 5 mg tablet TAKE 1 TABLET BY MOUTH DAILY 90 Tab 0    omeprazole (PRILOSEC OTC) 20 mg tablet Take 20 mg by mouth daily.  dicyclomine (BENTYL) 10 mg capsule Take 10 mg by mouth 4 times daily (before meals and nightly).  ARIPiprazole (ABILIFY) 10 mg tablet Take 1/2 to 1 tablet by mouth daily as directed      l-methylfolate (DEPLIN) 15 mg tablet Take 15 mg by mouth daily.  colestipol (COLESTID) 1 gram tablet Take 1 g by mouth two (2) times a day.  sertraline (ZOLOFT) 100 mg tablet Take 200 mg by mouth daily. Allergies   Allergen Reactions    Morphine Unknown (comments)     Hallucinations  Pt. Denies allergy           PE:     NEUROVASCULAR    Examination L R Examination L R   Carpal Comp. + + Pronator Comp. - -   Carpal Tinel + + Pronator Tinel - -   Phalen's + + Pronator Stress - -   Cubital Comp. - - Guyon Comp. - -   Cubital Tinel - - Guyon Tinel - -   Elbow Hyperflexion - - Adson's - -   Spurling's - - SC Comp. - -   PCB Median abn - - SC Tinel - -   Radial Tinel - - IC Comp.  - -   Digital Tinel - - IC Tinel - -   Radial 2-Pt WNL WNL Ulnar 2-Pt WNL WNL     Radial Pulse: 2+  Capillary Refill: < 2 sec  Nick: Not Performed  [employer]: Not Performed      Imaging: None indicated      NCV & EMG Findings:  Evaluation of the left median motor nerve showed prolonged distal onset latency (4.3 ms), reduced amplitude (2.8 mV), and decreased conduction velocity (Elbow-Wrist, 45 m/s). The right median motor nerve showed reduced amplitude (2.8 mV). The left median sensory nerve showed no response (Wrist) and no response (Elbow). The right median sensory nerve showed no response (Wrist). All remaining nerves (as indicated in the following tables) were within normal limits. All left vs. right side differences were within normal limits.       Needle evaluation of the right abductor pollicis brevis muscle showed increased insertional activity, moderately increased spontaneous activity, and increased motor unit amplitude. All remaining muscles (as indicated in the following table) showed no evidence of electrical instability.       INTERPRETATION  This is an abnormal electrodiagnostic examination. These findings may be consistent with:  1. Moderate median mononeuropathy at the left wrist (carpal tunnel syndrome)  2. Chronic mild median mononeuropathy at the right wrist (carpal tunnel syndrome) - previously treated     There is no electrodiagnostic evidence of any cervical radiculopathy, brachial plexopathy, peripheral polyneuropathy, or any other mononeuropathy.     CLINICAL INTERPRETATION  Her electrodiagnostic findings are consistent with symptoms in her hands. ICD-10-CM ICD-9-CM    1. Bilateral carpal tunnel syndrome G56.03 354.0 TRIAMCINOLONE ACETONIDE INJ      triamcinolone acetonide (KENALOG) 10 mg/mL injection      INJECT CARPAL TUNNEL       Plan:     Bilateral carpal tunnel injections    Continue wearing bilateral carpal tunnel braces at night    Follow-up in 3 months for reevaluation.     Plan was reviewed with patient, who verbalized agreement and understanding of the plan    [address] NOTE        Chart reviewed for the following:   IDemond DO, have reviewed the History, Physical and updated the Allergic reactions for [address] performed immediately prior to start of procedure:   Demond KENT DO, have performed the following reviews on Constance Youngblood prior to the start of the procedure:            * Patient was identified by name and date of birth   * Agreement on procedure being performed was verified  * Risks and Benefits explained to the patient  * Procedure site verified and marked as necessary  * Patient was positioned for comfort  * Consent was signed and verified     Time: 13:30      Date of procedure: 10/14/2019    Procedure performed by: Kings Ratliff DO    Provider assisted by: Amy Mortensen LPN    Patient assisted by: self    How tolerated by patient: tolerated the procedure well with no complications    Post Procedural Pain Scale: 0 - No Hurt    Comments: none    Procedure:  After consent was obtained, using sterile technique the carpal tunnel was prepped. Local anesthetic used: 1% lidocaine. Kenalog 5 mg X2 and was then injected and the needle withdrawn. The procedure was well tolerated. The patient is asked to continue to rest the area for a few more days before resuming regular activities. It may be more painful for the first 1-2 days. Watch for fever, or increased swelling or persistent pain in the joint. Call or return to clinic prn if such symptoms occur or there is failure to improve as anticipated.

## 2019-10-14 NOTE — PROGRESS NOTES
1. Have you been to the ER, urgent care clinic since your last visit? Hospitalized since your last visit? NO    2. Have you seen or consulted any other health care providers outside of the [address] [place] [address] since your last visit? Include any pap smears or colon screening.  NO

## 2019-10-14 NOTE — PROGRESS NOTES
As per Dr. Afia Corral instruction 1ml of kenalog 10 and 1ml of 1% lidocaine drawn into syringe for injection.

## 2019-10-15 DIAGNOSIS — M25.552 CHRONIC HIP PAIN, BILATERAL: ICD-10-CM

## 2019-10-15 DIAGNOSIS — G89.29 CHRONIC LEFT-SIDED LOW BACK PAIN WITHOUT SCIATICA: Primary | ICD-10-CM

## 2019-10-15 DIAGNOSIS — M51.37 DEGENERATION OF LUMBAR OR LUMBOSACRAL INTERVERTEBRAL DISC: ICD-10-CM

## 2019-10-15 DIAGNOSIS — M54.50 CHRONIC LEFT-SIDED LOW BACK PAIN WITHOUT SCIATICA: Primary | ICD-10-CM

## 2019-10-15 DIAGNOSIS — M25.551 CHRONIC HIP PAIN, BILATERAL: ICD-10-CM

## 2019-10-15 DIAGNOSIS — G89.29 CHRONIC HIP PAIN, BILATERAL: ICD-10-CM

## 2019-10-15 DIAGNOSIS — M96.1 POSTLAMINECTOMY SYNDROME, LUMBAR REGION: ICD-10-CM

## 2019-10-15 DIAGNOSIS — M70.62 TROCHANTERIC BURSITIS OF LEFT HIP: ICD-10-CM

## 2019-10-15 RX ORDER — OXYCODONE AND ACETAMINOPHEN 10; 325 MG/1; MG/1
1 TABLET ORAL
Qty: 70 TAB | Refills: 0 | Status: SHIPPED | OUTPATIENT
Start: 2019-10-18 | End: 2019-11-17

## 2019-10-15 NOTE — PROGRESS NOTES
Please notify patient that lab results were reviewed and and her sugar is mildly elevated. We will discuss results at her next office visit.

## 2019-10-15 NOTE — TELEPHONE ENCOUNTER
Miranda Montenegro has called requesting a refill of their controlled medication, percocet, for the management of her chronic back pain. Last office visit date: 09/10/19  Last opioid care agreement 01/10/19  Last UDS was done 06/28/19    Date last  was pulled and reviewed : 10/14/19  Last fill date for medication was 09/18/19 for percocet 10/325 mg #70 tabs    Was the patient compliant when the above report was pulled? yes    Analgesia: pt reports an 80% pain relief with her current opioid medication regimen     Aberrancies: none noted     ADL's: pt reports that she is able to perform her normal daily personal hygiene and cooking. She states that she has a hard time cleaning. Adverse Reaction: none reported by the pt at this time. Provider's last note and plan of care reviewed? yes  Request forwarded to provider for review.

## 2019-10-16 RX ORDER — CARVEDILOL 12.5 MG/1
TABLET ORAL
Qty: 180 TAB | Refills: 0 | Status: SHIPPED | OUTPATIENT
Start: 2019-10-16

## 2019-10-16 NOTE — TELEPHONE ENCOUNTER
Tried to reach patient to let her know that she has a script ready to pickup at the office but the phone line did not identify the patient so a message could not be left.

## 2019-10-17 NOTE — TELEPHONE ENCOUNTER
I called and spoke to the pt. The pt was identified using 2 pt identifiers. She was informed that her prescription is done and ready for . She verbalized understanding and has no questions at this time.

## 2019-10-17 NOTE — TELEPHONE ENCOUNTER
Patient called the nurse triage line to check the status of their Rx, since they said they hadn't heard back. Patient identified using two patient identifiers (name and ); patient advised prescriptions are ready for pick-up. Patient also informed  hours are Mon-Fri [id number]. Patient verbalized understanding.

## 2019-11-21 ENCOUNTER — DOCUMENTATION ONLY (OUTPATIENT)
Dept: PAIN MANAGEMENT | Age: 69
End: 2019-11-21

## 2019-11-21 DIAGNOSIS — M96.1 POSTLAMINECTOMY SYNDROME, LUMBAR REGION: ICD-10-CM

## 2019-11-21 DIAGNOSIS — M25.552 CHRONIC HIP PAIN, BILATERAL: ICD-10-CM

## 2019-11-21 DIAGNOSIS — M70.62 TROCHANTERIC BURSITIS OF LEFT HIP: ICD-10-CM

## 2019-11-21 DIAGNOSIS — G89.29 CHRONIC LEFT-SIDED LOW BACK PAIN WITHOUT SCIATICA: Primary | ICD-10-CM

## 2019-11-21 DIAGNOSIS — M25.551 CHRONIC HIP PAIN, BILATERAL: ICD-10-CM

## 2019-11-21 DIAGNOSIS — Z79.899 ENCOUNTER FOR LONG-TERM (CURRENT) USE OF MEDICATIONS: Primary | ICD-10-CM

## 2019-11-21 DIAGNOSIS — M51.37 DEGENERATION OF LUMBAR OR LUMBOSACRAL INTERVERTEBRAL DISC: ICD-10-CM

## 2019-11-21 DIAGNOSIS — M54.50 CHRONIC LEFT-SIDED LOW BACK PAIN WITHOUT SCIATICA: Primary | ICD-10-CM

## 2019-11-21 DIAGNOSIS — G89.29 CHRONIC HIP PAIN, BILATERAL: ICD-10-CM

## 2019-11-21 RX ORDER — NALOXONE HYDROCHLORIDE 4 MG/.1ML
SPRAY NASAL
Qty: 1 EACH | Refills: 1 | OUTPATIENT
Start: 2019-11-21

## 2019-11-21 RX ORDER — HYDROXYZINE 25 MG/1
25 TABLET, FILM COATED ORAL
Qty: 12 TAB | Refills: 0 | Status: ON HOLD | OUTPATIENT
Start: 2019-11-21 | End: 2020-02-17

## 2019-11-21 RX ORDER — CLONIDINE HYDROCHLORIDE 0.1 MG/1
0.1 TABLET ORAL
Qty: 8 TAB | Refills: 0 | Status: SHIPPED | OUTPATIENT
Start: 2019-11-21

## 2019-11-21 RX ORDER — OXYCODONE AND ACETAMINOPHEN 10; 325 MG/1; MG/1
1 TABLET ORAL
Qty: 70 TAB | Refills: 0 | Status: SHIPPED | OUTPATIENT
Start: 2019-11-21 | End: 2019-12-21

## 2019-11-21 RX ORDER — PROMETHAZINE HYDROCHLORIDE 12.5 MG/1
12.5 TABLET ORAL
Qty: 12 TAB | Refills: 0 | Status: ON HOLD | OUTPATIENT
Start: 2019-11-21 | End: 2020-02-17

## 2019-11-21 NOTE — TELEPHONE ENCOUNTER
Spoke with patient after getting 2 points of identity and informed patient she has a script ready to pickup at the office but she will have to speak to the provider before she gets the script. Please come by 3:45pm and bring a valid picture ID. Patient confirmed.

## 2019-11-21 NOTE — TELEPHONE ENCOUNTER
Lizet Del Real has called requesting a refill of their controlled medication Percocet for the management of chronic pain syndrome. Last office visit date: 09/10/19  Last opioid care agreement 01/10/19  Last UDS was done 06/28/19    Date last  was pulled and reviewed : 11/21/19  Last fill date for medication was 10/17/19    Was the patient compliant when the above report was pulled? yes    Analgesia: 50%    Aberrancies: no    ADL's: yes    Adverse Reaction: no    Provider's last note and plan of care reviewed? yes  Request forwarded to provider for review.

## 2019-11-25 RX ORDER — AMLODIPINE BESYLATE 5 MG/1
TABLET ORAL
Qty: 90 TAB | Refills: 0 | Status: SHIPPED | OUTPATIENT
Start: 2019-11-25 | End: 2020-03-11 | Stop reason: SDUPTHER

## 2020-01-07 ENCOUNTER — HOSPITAL ENCOUNTER (OUTPATIENT)
Dept: GENERAL RADIOLOGY | Age: 70
Discharge: HOME | End: 2020-01-07
Attending: PHYSICAL MEDICINE & REHABILITATION
Payer: MEDICARE

## 2020-01-07 ENCOUNTER — HOSPITAL ENCOUNTER (OUTPATIENT)
Age: 70
Discharge: HOME | End: 2020-01-07
Attending: PHYSICAL MEDICINE & REHABILITATION
Payer: MEDICARE

## 2020-01-07 DIAGNOSIS — M50.30 DEGENERATION OF CERVICAL INTERVERTEBRAL DISC: ICD-10-CM

## 2020-01-07 DIAGNOSIS — M96.1 POSTLAMINECTOMY SYNDROME, NOT ELSEWHERE CLASSIFIED: ICD-10-CM

## 2020-01-07 DIAGNOSIS — M96.1 POSTLAMINECTOMY SYNDROME: ICD-10-CM

## 2020-01-07 LAB — CREAT UR-MCNC: 1 MG/DL (ref 0.6–1.3)

## 2020-01-07 PROCEDURE — 72141 MRI NECK SPINE W/O DYE: CPT

## 2020-01-07 PROCEDURE — 82565 ASSAY OF CREATININE: CPT

## 2020-01-07 PROCEDURE — A9575 INJ GADOTERATE MEGLUMI 0.1ML: HCPCS

## 2020-01-07 PROCEDURE — 74011636320 HC RX REV CODE- 636/320

## 2020-01-07 PROCEDURE — 72120 X-RAY BEND ONLY L-S SPINE: CPT

## 2020-01-07 PROCEDURE — 72158 MRI LUMBAR SPINE W/O & W/DYE: CPT

## 2020-01-07 RX ADMIN — GADOTERATE MEGLUMINE 15 ML: 376.9 INJECTION INTRAVENOUS at 11:00

## 2020-02-16 ENCOUNTER — ANESTHESIA EVENT (OUTPATIENT)
Dept: ENDOSCOPY | Age: 70
End: 2020-02-16
Payer: MEDICARE

## 2020-02-17 ENCOUNTER — ANESTHESIA (OUTPATIENT)
Dept: ENDOSCOPY | Age: 70
End: 2020-02-17
Payer: MEDICARE

## 2020-02-17 ENCOUNTER — HOSPITAL ENCOUNTER (OUTPATIENT)
Age: 70
Setting detail: OUTPATIENT SURGERY
Discharge: HOME | End: 2020-02-17
Attending: INTERNAL MEDICINE | Admitting: INTERNAL MEDICINE
Payer: MEDICARE

## 2020-02-17 VITALS
TEMPERATURE: 97.8 F | HEART RATE: 75 BPM | BODY MASS INDEX: 26.88 KG/M2 | SYSTOLIC BLOOD PRESSURE: 179 MMHG | WEIGHT: 142.38 LBS | DIASTOLIC BLOOD PRESSURE: 74 MMHG | HEIGHT: 61 IN | RESPIRATION RATE: 16 BRPM | OXYGEN SATURATION: 96 %

## 2020-02-17 PROCEDURE — 76040000019: Performed by: INTERNAL MEDICINE

## 2020-02-17 PROCEDURE — 74011000250 HC RX REV CODE- 250: Performed by: NURSE ANESTHETIST, CERTIFIED REGISTERED

## 2020-02-17 PROCEDURE — 74011250636 HC RX REV CODE- 250/636: Performed by: NURSE ANESTHETIST, CERTIFIED REGISTERED

## 2020-02-17 PROCEDURE — 77030018846 HC SOL IRR STRL H20 ICUM -A: Performed by: INTERNAL MEDICINE

## 2020-02-17 PROCEDURE — 76060000031 HC ANESTHESIA FIRST 0.5 HR: Performed by: INTERNAL MEDICINE

## 2020-02-17 PROCEDURE — 77030008565 HC TBNG SUC IRR ERBE -B: Performed by: INTERNAL MEDICINE

## 2020-02-17 RX ORDER — SODIUM CHLORIDE, SODIUM LACTATE, POTASSIUM CHLORIDE, CALCIUM CHLORIDE 600; 310; 30; 20 MG/100ML; MG/100ML; MG/100ML; MG/100ML
50 INJECTION, SOLUTION INTRAVENOUS CONTINUOUS
Status: DISCONTINUED | OUTPATIENT
Start: 2020-02-17 | End: 2020-02-17 | Stop reason: HOSPADM

## 2020-02-17 RX ORDER — SODIUM CHLORIDE 0.9 % (FLUSH) 0.9 %
5-40 SYRINGE (ML) INJECTION EVERY 8 HOURS
Status: DISCONTINUED | OUTPATIENT
Start: 2020-02-17 | End: 2020-02-17 | Stop reason: HOSPADM

## 2020-02-17 RX ORDER — SODIUM CHLORIDE, SODIUM LACTATE, POTASSIUM CHLORIDE, CALCIUM CHLORIDE 600; 310; 30; 20 MG/100ML; MG/100ML; MG/100ML; MG/100ML
75 INJECTION, SOLUTION INTRAVENOUS CONTINUOUS
Status: DISCONTINUED | OUTPATIENT
Start: 2020-02-17 | End: 2020-02-17 | Stop reason: HOSPADM

## 2020-02-17 RX ORDER — SODIUM CHLORIDE 0.9 % (FLUSH) 0.9 %
5-40 SYRINGE (ML) INJECTION AS NEEDED
Status: DISCONTINUED | OUTPATIENT
Start: 2020-02-17 | End: 2020-02-17 | Stop reason: HOSPADM

## 2020-02-17 RX ORDER — PROPOFOL 10 MG/ML
INJECTION, EMULSION INTRAVENOUS AS NEEDED
Status: DISCONTINUED | OUTPATIENT
Start: 2020-02-17 | End: 2020-02-17 | Stop reason: HOSPADM

## 2020-02-17 RX ORDER — HYDRALAZINE HYDROCHLORIDE 20 MG/ML
10 INJECTION INTRAMUSCULAR; INTRAVENOUS
Status: DISCONTINUED | OUTPATIENT
Start: 2020-02-17 | End: 2020-02-17 | Stop reason: HOSPADM

## 2020-02-17 RX ORDER — LIDOCAINE HYDROCHLORIDE 20 MG/ML
INJECTION, SOLUTION EPIDURAL; INFILTRATION; INTRACAUDAL; PERINEURAL AS NEEDED
Status: DISCONTINUED | OUTPATIENT
Start: 2020-02-17 | End: 2020-02-17 | Stop reason: HOSPADM

## 2020-02-17 RX ORDER — LABETALOL HCL 20 MG/4 ML
SYRINGE (ML) INTRAVENOUS AS NEEDED
Status: DISCONTINUED | OUTPATIENT
Start: 2020-02-17 | End: 2020-02-17 | Stop reason: HOSPADM

## 2020-02-17 RX ADMIN — LABETALOL 20 MG/4 ML (5 MG/ML) INTRAVENOUS SYRINGE 10 MG: at 11:51

## 2020-02-17 RX ADMIN — SODIUM CHLORIDE, SODIUM LACTATE, POTASSIUM CHLORIDE, AND CALCIUM CHLORIDE 75 ML/HR: 600; 310; 30; 20 INJECTION, SOLUTION INTRAVENOUS at 09:52

## 2020-02-17 RX ADMIN — PROPOFOL 50 MG: 10 INJECTION, EMULSION INTRAVENOUS at 11:51

## 2020-02-17 RX ADMIN — LIDOCAINE HYDROCHLORIDE 60 MG: 20 INJECTION, SOLUTION EPIDURAL; INFILTRATION; INTRACAUDAL; PERINEURAL at 11:44

## 2020-02-17 RX ADMIN — PROPOFOL 50 MG: 10 INJECTION, EMULSION INTRAVENOUS at 11:47

## 2020-02-17 RX ADMIN — PROPOFOL 50 MG: 10 INJECTION, EMULSION INTRAVENOUS at 11:44

## 2020-02-17 RX ADMIN — HYDRALAZINE HYDROCHLORIDE 10 MG: 20 INJECTION INTRAMUSCULAR; INTRAVENOUS at 12:38

## 2020-02-17 RX ADMIN — LABETALOL 20 MG/4 ML (5 MG/ML) INTRAVENOUS SYRINGE 10 MG: at 11:56

## 2020-02-17 RX ADMIN — FAMOTIDINE 20 MG: 10 INJECTION, SOLUTION INTRAVENOUS at 09:52

## 2020-02-17 NOTE — ANESTHESIA PREPROCEDURE EVALUATION
Relevant Problems   No relevant active problems       Anesthetic History   No history of anesthetic complications            Review of Systems / Medical History  Patient summary reviewed, nursing notes reviewed and pertinent labs reviewed    Pulmonary        Sleep apnea: No treatment           Neuro/Psych         Psychiatric history     Cardiovascular    Hypertension: well controlled              Exercise tolerance[de-identified] Unable to adequately assess 2/2 arthritis. Comments: 2016 ECG: Borderline findings only. GI/Hepatic/Renal     GERD: poorly controlled      PUD    Comments: c/o dysphagia. Endo/Other        Arthritis     Other Findings            Physical Exam    Airway  Mallampati: II  TM Distance: 4 - 6 cm  Neck ROM: decreased range of motion   Mouth opening: Normal     Cardiovascular  Regular rate and rhythm,  S1 and S2 normal,  no murmur, click, rub, or gallop  Rhythm: regular  Rate: normal         Dental      Comments: Chip tooth #8, otherwise WNL.    Pulmonary  Breath sounds clear to auscultation               Abdominal  Abdominal exam normal       Other Findings            Anesthetic Plan    ASA: 3  Anesthesia type: MAC          Induction: Intravenous  Anesthetic plan and risks discussed with: Patient

## 2020-02-17 NOTE — DISCHARGE INSTRUCTIONS
Upper GI Endoscopy: What to Expect at [address] [place] [address]  After you have an endoscopy, you will stay at the hospital or clinic for 1 to 2 hours. This will allow the medicine to wear off. You will be able to go home after your doctor or nurse checks to make sure you are not having any problems. You may have to stay overnight if you had treatment during the test. You may have a sore throat for a day or two after the test.  This care sheet gives you a general idea about what to expect after the test.  How can you care for yourself at home? Activity  · Rest as much as you need to after you go home. · You should be able to go back to your usual activities the day after the test.  Diet  · Follow your doctor's directions for eating after the test.  · Drink plenty of fluids (unless your doctor has told you not to). Medications  · If you have a sore throat the day after the test, use an over-the-counter spray to numb your throat. Follow-up care is a key part of your treatment and safety. Be sure to make and go to all appointments, and call your doctor if you are having problems. It's also a good idea to know your test results and keep a list of the medicines you take. When should you call for help? Call 911 anytime you think you may need emergency care. For example, call if:  · You passed out (lost consciousness). · You cough up blood. · You vomit blood or what looks like coffee grounds. · You pass maroon or very bloody stools. Call your doctor now or seek immediate medical care if:  · You have trouble swallowing. · You have belly pain. · Your stools are black and tarlike or have streaks of blood. · You are sick to your stomach or cannot keep fluids down. Watch closely for changes in your health, and be sure to contact your doctor if:  · Your throat still hurts after a day or two. · You do not get better as expected. Where can you learn more?    Go to DealExplorer.be  Enter J454 in the search box to learn more about \"Upper GI Endoscopy: What to Expect at Home. \"   © 6778-4907 [employer]. Care instructions adapted under license by [facility] (which disclaims liability or warranty for this information). This care instruction is for use with your licensed healthcare professional. If you have questions about a medical condition or this instruction, always ask your healthcare professional. [address] any warranty or liability for your use of this information. Content Version: 55.1.096826; Current as of: November 14, 2014    DISCHARGE SUMMARY from Nurse     POST-PROCEDURE INSTRUCTIONS:    Call your Physician if you:  ? Observe any excess bleeding. ? Develop a temperature over 100.5o F.  ? Experience abdominal, shoulder or chest pain. ? Notice any signs of decreased circulation or nerve impairment to an extremity such as a change in color, persistent numbness, tingling, coldness or increase in pain. ? Vomit blood or you have nausea and vomiting lasting longer than 4 hours. ? Are unable to take medications. ? Are unable to urinate within 8 hours after discharge following general anesthesia or intravenous sedation. For the next 24 hours after receiving general anesthesia or intravenous sedation, or while taking prescription Narcotics, limit your activities:  ? Do NOT drive a motor vehicle, operate hazard machinery or power tools, or perform tasks that require coordination. The medication you received during your procedure may have some effect on your mental awareness. ? Do NOT make important personal or business decisions. The medication you received during your procedure may have some effect on your mental awareness. ? Do NOT drink alcoholic beverages. These drinks do not mix well with the medications that have been given to you. ? Upon discharge from the hospital, you must be accompanied by a responsible adult. ?  Resume your diet as directed by your physician. ? Resume medications as your physician has prescribed. ? Please give a list of your current medications to your Primary Care Provider. ? Please update this list whenever your medications are discontinued, doses are changed, or new medications (including over-the-counter products) are added. ? Please carry medication information at all times in case of emergency situations. These are general instructions for a healthy lifestyle:    No smoking/ No tobacco products/ Avoid exposure to second hand smoke.  Surgeon General's Warning:  Quitting smoking now greatly reduces serious risk to your health. Obesity, smoking, and a sedentary lifestyle greatly increase your risk for illness.  A healthy diet, regular physical exercise & weight monitoring are important for maintaining a healthy lifestyle   You may be retaining fluid if you have a history of heart failure or if you experience any of the following symptoms:  Weight gain of 3 pounds or more overnight or 5 pounds in a week, increased swelling in our hands or feet or shortness of breath while lying flat in bed. Please call your doctor as soon as you notice any of these symptoms; do not wait until your next office visit. Recognize signs and symptoms of STROKE:  F  -  Face looks uneven  A  -  Arms unable to move or move unevenly  S  -  Speech slurred or non-existent  T  -  Time to call 911 - as soon as signs and symptoms begin - DO NOT go back to bed or wait to see If you get better - TIME IS BRAIN. Colorectal Screening   Colorectal cancer almost always develops from precancerous polyps (abnormal growths) in the colon or rectum. Screening tests can find precancerous polyps, so that they can be removed before they turn into cancer.  Screening tests can also find colorectal cancer early, when treatment works best.  Velta Ganser Speak with your physician about when you should begin screening and how often you should be tested  Velta Ganser   Additional Information    If you have questions, please call [phone]. Remember, MyChart is NOT to be used for urgent needs. For medical emergencies, dial 911. Educational references and/or instructions provided during this visit included:    see attachedments      APPOINTMENTS:    Please make a follow-up appointment with your physician. Discharge information has been reviewed with the patient and responsible party. The patient and responsible party verbalized understanding. Patient Education   Patient Education   Patient Education        Esophageal Dilation: What to Expect at Home  Your Recovery  After you have esophageal dilation, you will stay at the hospital or surgery center for 1 to 2 hours. This will allow the medicine to wear off. You will be able to go home after your doctor or nurse checks to make sure you are not having any problems. This care sheet gives you a general idea about how long it will take for you to recover. But each person recovers at a different pace. Follow the steps below to get better as quickly as possible. How can you care for yourself at home? Activity    · Rest as much as you need to after you go home.     · You should be able to go back to your usual activities the day after the procedure. Diet    · Follow your doctor's directions for eating after the procedure.     · Drink plenty of fluids (unless your doctor has told you not to). Medicines    · Your doctor will tell you if and when you can restart your medicines. He or she will also give you instructions about taking any new medicines.     · If you take blood thinners, such as warfarin (Coumadin), clopidogrel (Plavix), or aspirin, be sure to talk to your doctor. He or she will tell you if and when to start taking those medicines again. Make sure that you understand exactly what your doctor wants you to do.     · If you have a sore throat the day after the procedure, use an over-the-counter spray to numb your throat. Sucking on throat lozenges and gargling with warm salt water may also help relieve your symptoms. Follow-up care is a key part of your treatment and safety. Be sure to make and go to all appointments, and call your doctor if you are having problems. It's also a good idea to know your test results and keep a list of the medicines you take. When should you call for help? Call 911 anytime you think you may need emergency care. For example, call if:    · You passed out (lost consciousness).     · You have trouble breathing.     · Your stools are maroon or very bloody    Call your doctor now or seek immediate medical care if:    · You have new or worse belly pain.     · You have a fever.     · You have new or more blood in your stools.     · You are sick to your stomach and cannot drink fluids.     · You cannot pass stools or gas.     · You have pain that does not get better after you take pain medicine.    Watch closely for changes in your health, and be sure to contact your doctor if:    · Your throat still hurts after a day or two.     · You do not get better as expected. Where can you learn more? Go to http://regan-caitlin.info/. Enter S972 in the search box to learn more about \"Esophageal Dilation: What to Expect at Home. \"  Current as of: November 7, 2018  Content Version: 12.2  © 9763-6658 [employer]. Care instructions adapted under license by [employer] (which disclaims liability or warranty for this information). If you have questions about a medical condition or this instruction, always ask your healthcare professional. [place] [address] any warranty or liability for your use of this information. Hiatal Hernia: Care Instructions  Your Care Instructions  A hiatal hernia occurs when part of the stomach bulges into the chest cavity. A hiatal hernia may allow stomach acid and juices to back up into the esophagus (acid reflux).  This can cause a feeling of burning, warmth, heat, or pain behind the breastbone. This feeling may often occur after you eat, soon after you lie down, or when you bend forward, and it may come and go. You also may have a sour taste in your mouth. These symptoms are commonly known as heartburn or reflux. But not all hiatal hernias cause symptoms. Follow-up care is a key part of your treatment and safety. Be sure to make and go to all appointments, and call your doctor if you are having problems. It's also a good idea to know your test results and keep a list of the medicines you take. How can you care for yourself at home? · Take your medicines exactly as prescribed. Call your doctor if you think you are having a problem with your medicine. · Do not take aspirin or other nonsteroidal anti-inflammatory drugs (NSAIDs), such as ibuprofen (Advil, Motrin) or naproxen (Aleve), unless your doctor says it is okay. Ask your doctor what you can take for pain. · Your doctor may recommend over-the-counter medicine. For mild or occasional indigestion, antacids such as Tums, Gaviscon, Maalox, or Mylanta may help. Your doctor also may recommend over-the-counter acid reducers, such as famotidine (Pepcid AC), cimetidine (Tagamet HB), ranitidine (Zantac 75 and Zantac 150), or omeprazole (Prilosec). Read and follow all instructions on the label. If you use these medicines often, talk with your doctor. · Change your eating habits. ? It's best to eat several small meals instead of two or three large meals. ? After you eat, wait 2 to 3 hours before you lie down. Late-night snacks aren't a good idea. ? Chocolate, mint, and alcohol can make heartburn worse. They relax the valve between the esophagus and the stomach. ? Spicy foods, foods that have a lot of acid (like tomatoes and oranges), and coffee can make heartburn symptoms worse in some people.  If your symptoms are worse after you eat a certain food, you may want to stop eating that food to see if your symptoms get better. · Do not smoke or chew tobacco.  · If you get heartburn at night, raise the head of your bed 6 to 8 inches by putting the frame on blocks or placing a foam wedge under the head of your mattress. (Adding extra pillows does not work.)  · Do not wear tight clothing around your middle. · Lose weight if you need to. Losing just 5 to 10 pounds can help. When should you call for help? Call your doctor now or seek immediate medical care if:    · You have new or worse belly pain.     · You are vomiting.    Watch closely for changes in your health, and be sure to contact your doctor if:    · You have new or worse symptoms of indigestion.     · You have trouble or pain swallowing.     · You are losing weight.     · You do not get better as expected. Where can you learn more? Go to http://regan-caitlin.info/. Enter Y720 in the search box to learn more about \"Hiatal Hernia: Care Instructions. \"  Current as of: November 7, 2018  Content Version: 12.2  © 4549-8284 [employer]. Care instructions adapted under license by [employer] (which disclaims liability or warranty for this information). If you have questions about a medical condition or this instruction, always ask your healthcare professional. [address] any warranty or liability for your use of this information. Learning About Swallowing Problems  What are swallowing problems? Certain health problems that affect the nervous system can cause trouble swallowing. These conditions include stroke, ALS (also known as Salma Gehrig's disease), Parkinson's disease, and multiple sclerosis. The muscles and nerves that help move food through the throat and esophagus may not work right. Growths, such as cancer, and other problems with your esophagus can also make it hard to swallow. The esophagus is the tube that leads from your throat to your stomach.   How are swallowing problems diagnosed? A doctor or speech therapist will examine you to check for swallowing problems. You may get swallowing tests to check how well your throat muscles work. For these tests, you swallow a special liquid that helps the doctor see your throat and esophagus on an X-ray or video screen. Other tests use a thin, flexible tube called a scope to check for problems with your esophagus. The doctor puts the scope in your mouth and down your throat to look at your esophagus. What are the symptoms? Symptoms of swallowing problems may include:  · Trouble getting food or liquids to go down on the first try. · Gagging, choking, or coughing when you swallow. · Having food or liquids come back up through your throat, mouth, or nose after you swallow. · Feeling like foods or liquids are stuck in some part of your throat or chest.  · Pain when you swallow. How are swallowing problems treated? How swallowing problems are treated depends on the cause. The main goals of treatment will be to help you eat and swallow safely and get good nutrition. This is important for your health and quality of life. You may learn exercises to train your throat muscles to work together so you're able to swallow better. Learning certain ways to put food in your mouth or to position your head while eating may also help. Your doctor or a speech therapist may recommend changes to your diet to help make it easier to swallow. You may need to avoid certain foods or liquids. You also may need to change the thickness of foods or liquids in your diet. To eat and swallow safely, follow any instructions you get from your doctor or therapist. These ideas may help:  · Sit upright when eating, drinking, and taking pills. · Take small bites of food. Chew completely and swallow before taking another bite. · Take small sips of liquids. · If eating makes you tired, eat smaller but more frequent meals.   · Tip your chin down when there is food in your mouth. Where can you learn more? Go to http://regan-caitlin.info/. Enter 109 [id number] [id number] in the search box to learn more about \"Learning About Swallowing Problems. \"  Current as of: June 26, 2019  Content Version: 12.2  © 4163-2179 [employer]. Care instructions adapted under license by [employer] (which disclaims liability or warranty for this information). If you have questions about a medical condition or this instruction, always ask your healthcare professional. [address] any warranty or liability for your use of this information.

## 2020-02-17 NOTE — ANESTHESIA POSTPROCEDURE EVALUATION
Procedure(s):  UPPER ENDOSCOPY / dilation. MAC    Anesthesia Post Evaluation      Multimodal analgesia: multimodal analgesia not used between 6 hours prior to anesthesia start to PACU discharge  Patient location during evaluation: PACU  Patient participation: complete - patient participated  Level of consciousness: awake  Pain score: 0  Pain management: adequate  Airway patency: patent  Anesthetic complications: no  Cardiovascular status: acceptable  Respiratory status: acceptable  Hydration status: acceptable  Comments: Several doses of anti-HTN medications were required perioperatively to normalize her systolic BP. She was counseled to f/u with her PCP. Post anesthesia nausea and vomiting:  none      Vitals Value Taken Time   /85 2/17/2020 12:56 PM   Temp 36.4 °C (97.6 °F) 2/17/2020 12:07 PM   Pulse 72 2/17/2020  1:00 PM   Resp 17 2/17/2020  1:00 PM   SpO2 99 % 2/17/2020 12:58 PM   Vitals shown include unvalidated device data. declines

## 2020-02-17 NOTE — H&P
[email or web address]  [phone]      GASTROENTEROLOGY Pre-Procedure H and P      Impression/Plan:   1.  This patient is consented for an EGD for dysphagia    Addendum: All lab tests orders pertaining to the procedure have been ordered by the anesthesia personnel and results will be addressed by the anesthesia team  Chief Complaint: dysphagia      HPI:  Charleen Kwok is a 68 y.o. female who is being is having an EGD for dysphagia  PMH:   Past Medical History:   Diagnosis Date    Abdominal pain     Benign essential hypertension     Cancer ([facility] [address]     uterine    Depressive disorder     Fatigue     Hypertension     Insomnia     NATHANAEL (obstructive sleep apnea)     does not use cpap machine    Osteoarthritis     Peptic ulcer 2012    Spinal stenosis     with chronic pain       PSH:   Past Surgical History:   Procedure Laterality Date    HX APPENDECTOMY  1962     [place]; L4-L5    HX GASTRIC BYPASS  2009    HX HYSTERECTOMY  1983    uterine cancer, stage 1    HX KNEE REPLACEMENT  2005    right    HX VISHAL AND BSO         Social HX:   Social History     Socioeconomic History    Marital status:      Spouse name: Not on file    Number of children: Not on file    Years of education: Not on file    Highest education level: Not on file   Occupational History    Not on file   Social Needs    Financial resource strain: Not on file    Food insecurity:     Worry: Not on file     Inability: Not on file    Transportation needs:     Medical: Not on file     Non-medical: Not on file   Tobacco Use    Smoking status: Current Every Day Smoker     Packs/day: 0.50     Types: Cigarettes    Smokeless tobacco: Never Used   Substance and Sexual Activity    Alcohol use: Yes     Comment: socially    Drug use: No     Comment: denies    Sexual activity: Never     Partners: Male   Lifestyle    Physical activity:     Days per week: Not on file     Minutes per session: Not on file    Stress: Not on file   Relationships    Social connections:     Talks on phone: Not on file     Gets together: Not on file     Attends [employer] service: Not on file     Active member of club or organization: Not on file     Attends meetings of clubs or organizations: Not on file     Relationship status: Not on file    Intimate partner violence:     Fear of current or ex partner: Not on file     Emotionally abused: Not on file     Physically abused: Not on file     Forced sexual activity: Not on file   Other Topics Concern    Not on file   Social History Narrative    Not on file       FHX:   Family History   Problem Relation Age of Onset    Cancer Mother         lung-nonsmoker    Diabetes Other     Heart Disease Other        Allergy:   Allergies   Allergen Reactions    Morphine Unknown (comments)     Hallucinations  Pt. Denies allergy         Home Medications:     Medications Prior to Admission   Medication Sig    cloNIDine HCl (CATAPRES) 0.1 mg tablet Take 1 Tab by mouth every twelve (12) hours as needed (restlessness, hot flashes, sweats - PRN opiate withdrawl) for up to 8 doses.  carvedilol (COREG) 12.5 mg tablet TAKE 1 TABLET BY MOUTH TWICE DAILY WITH MEALS    docusate sodium (COLACE) 100 mg capsule Take 1 Cap by mouth two (2) times daily as needed for Constipation.  amLODIPine (NORVASC) 5 mg tablet TAKE 1 TABLET BY MOUTH DAILY    omeprazole (PRILOSEC OTC) 20 mg tablet Take 20 mg by mouth daily.  dicyclomine (BENTYL) 10 mg capsule Take 10 mg by mouth 4 times daily (before meals and nightly).  ARIPiprazole (ABILIFY) 10 mg tablet Take 1/2 to 1 tablet by mouth daily as directed    l-methylfolate (DEPLIN) 15 mg tablet Take 15 mg by mouth daily.  colestipol (COLESTID) 1 gram tablet Take 1 g by mouth two (2) times a day.  sertraline (ZOLOFT) 100 mg tablet Take 200 mg by mouth daily.     amLODIPine (NORVASC) 5 mg tablet TAKE 1 TABLET BY MOUTH DAILY    naloxone (NARCAN) 4 mg/actuation nasal spray Use 1 spray intranasally, then discard. Repeat with new spray every 2 min as needed for opioid overdose symptoms, alternating nostrils. Indications: Decrease in Rate & Depth of Breathing due to Opioid Drug, opioid overdose       Review of Systems:     Constitutional: No fevers, chills, weight loss, fatigue. Skin: No rashes, pruritis, jaundice, ulcerations, erythema. HENT: No headaches, nosebleeds, sinus pressure, rhinorrhea, sore throat. Eyes: No visual changes, blurred vision, eye pain, photophobia, jaundice. Cardiovascular: No chest pain, heart palpitations. Respiratory: No cough, SOB, wheezing, chest discomfort, orthopnea. Gastrointestinal:    Genitourinary: No dysuria, bleeding, discharge, pyuria. Musculoskeletal: No weakness, arthralgias, wasting. Endo: No sweats. Heme: No bruising, easy bleeding. Allergies: As noted. Neurological: Cranial nerves intact. Alert and oriented. Gait not assessed. Psychiatric:  No anxiety, depression, hallucinations. Visit Vitals  /71   Pulse 68   Temp 97.6 °F (36.4 °C)   Resp 18   Ht 5' 1\" (1.549 m)   Wt 64.6 kg (142 lb 6 oz)   SpO2 98%   Breastfeeding No   BMI 26.90 kg/m²       Physical Assessment:     constitutional: appearance: well developed, well nourished, normal habitus, no deformities, in no acute distress. skin: inspection: no rashes, ulcers, icterus or other lesions; no clubbing or telangiectasias. palpation: no induration or subcutaneos nodules. eyes: inspection: normal conjunctivae and lids; no jaundice pupils: normal  ENMT: mouth: normal oral mucosa,lips and gums; good dentition. oropharynx: normal tongue, hard and soft palate; posterior pharynx without erithema, exudate or lesions. neck: thyroid: normal size, consistency and position; no masses or tenderness. respiratory: effort: normal chest excursion; no intercostal retraction or accessory muscle use. cardiovascular: abdominal aorta: normal size and position; no bruits. palpation: PMI of normal size and position; normal rhythm; no thrill or murmurs. abdominal: abdomen: normal consistency; no tenderness or masses. hernias: no hernias appreciated. liver: normal size and consistency. spleen: not palpable. rectal: hemoccult/guaiac: not performed. musculoskeletal: digits and nails: no clubbing, cyanosis, petechiae or other inflammatory conditions. gait: normal gait and station head and neck: normal range of motion; no pain, crepitation or contracture. spine/ribs/pelvis: normal range of motion; no pain, deformity or contracture. neurologic: cranial nerves: II-XII normal.   psychiatric: judgement/insight: within normal limits. memory: within normal limits for recent and remote events. mood and affect: no evidence of depression, anxiety or agitation. orientation: oriented to time, space and person. Basic Metabolic Profile   No results for input(s): NA, K, CL, CO2, BUN, GLU, CA, MG, PHOS in the last 72 hours. No lab exists for component: CREAT      CBC w/Diff    No results for input(s): WBC, RBC, HGB, HCT, MCV, MCH, MCHC, RDW, PLT, HGBEXT, HCTEXT, PLTEXT in the last 72 hours. No lab exists for component: MPV No results for input(s): GRANS, LYMPH, EOS, PRO, MYELO, METAS, BLAST in the last 72 hours. No lab exists for component: MONO, BASO     Hepatic Function   No results for input(s): ALB, TP, TBILI, GPT, SGOT, AP, AML, LPSE in the last 72 hours. No lab exists for component: DBILI     Coags   No results for input(s): PTP, INR, APTT, INREXT in the last 72 hours. Radha Dave MD  [facility] [address]  Cell: [phone]  Direct pager: [phone]  [email or web address]  [email or web address]

## 2020-02-17 NOTE — PROGRESS NOTES
[email or web address]. [address]  [address] [facility], [address]      Brief Procedure Note    Antione Holliday  1950  [id number]    Date of Procedure: 2/17/2020    Preoperative diagnosis: Body mass index 26.0 - 26.9,  adult:   V85.22 - Z68.26  Smoker:  305.1 - F17.200  Acid reflux:   530.81 - K21.9  Dysphagia:  787.20 - R13.10  Irritable bowel syndrome, diarrhea predominant:   564.1 - K58.0  Personal history of colon polyps:  V12.72 - Z86.010    Postoperative diagnosis: status post gastric bypass, gastro-gastric fistula, hiatal hernia, inlet patch, non-obstructive dysphagia    Type of Anesthesia: MAC (Monitored anesthesia care)    Description of findings: same as post op dx    Procedure: Procedure(s):  UPPER ENDOSCOPY / dilation    :  Dr. Vania Fothergill, MD    Assistant(s): Endoscopy Technician-1: Elina Finn  Endoscopy RN-1: Paige Loomis RN; Jose Perez RN; Zen Gonzales    Devices/implants/grafts/tissues/prosthesis: None    EBL:None    Specimens: * No specimens in log *    Findings: See printed and scanned procedure note    Complications: None    Dr. Vania Fothergill, MD  2/17/2020  12:04 PM

## 2020-03-02 ENCOUNTER — HOSPITAL ENCOUNTER (OUTPATIENT)
Dept: GENERAL RADIOLOGY | Age: 70
Discharge: HOME | End: 2020-03-02
Attending: INTERNAL MEDICINE
Payer: MEDICARE

## 2020-03-02 DIAGNOSIS — Z48.815 ENCOUNTER FOR SURGICAL AFTERCARE FOLLOWING SURGERY ON THE DIGESTIVE SYSTEM: ICD-10-CM

## 2020-03-02 DIAGNOSIS — K22.89 ESOPHAGEAL HEMORRHAGE: ICD-10-CM

## 2020-03-02 DIAGNOSIS — S27.809A RUPTURE OF DIAPHRAGM: ICD-10-CM

## 2020-03-02 PROCEDURE — 74011000250 HC RX REV CODE- 250: Performed by: INTERNAL MEDICINE

## 2020-03-02 PROCEDURE — 74246 X-RAY XM UPR GI TRC 2CNTRST: CPT

## 2020-03-02 PROCEDURE — 74011000255 HC RX REV CODE- 255: Performed by: INTERNAL MEDICINE

## 2020-03-02 RX ADMIN — BARIUM SULFATE 176 G: 960 POWDER, FOR SUSPENSION ORAL at 08:40

## 2020-03-02 RX ADMIN — BARIUM SULFATE 135 ML: 980 POWDER, FOR SUSPENSION ORAL at 08:40

## 2020-03-02 RX ADMIN — ANTACID/ANTIFLATULENT 4 G: 380; 550; 10; 10 GRANULE, EFFERVESCENT ORAL at 08:40

## 2020-03-02 RX ADMIN — BARIUM SULFATE 700 MG: 700 TABLET ORAL at 08:40

## 2020-03-11 ENCOUNTER — OFFICE VISIT (OUTPATIENT)
Dept: ORTHOPEDIC SURGERY | Age: 70
End: 2020-03-11

## 2020-03-11 VITALS
OXYGEN SATURATION: 95 % | BODY MASS INDEX: 27.56 KG/M2 | WEIGHT: 146 LBS | HEIGHT: 61 IN | DIASTOLIC BLOOD PRESSURE: 59 MMHG | SYSTOLIC BLOOD PRESSURE: 155 MMHG | TEMPERATURE: 98.1 F | RESPIRATION RATE: 16 BRPM | HEART RATE: 73 BPM

## 2020-03-11 DIAGNOSIS — M48.02 CERVICAL SPINAL STENOSIS: Primary | ICD-10-CM

## 2020-03-11 DIAGNOSIS — G56.03 BILATERAL CARPAL TUNNEL SYNDROME: ICD-10-CM

## 2020-03-11 DIAGNOSIS — M25.511 RIGHT SHOULDER PAIN, UNSPECIFIED CHRONICITY: ICD-10-CM

## 2020-03-11 RX ORDER — OXYCODONE AND ACETAMINOPHEN 10; 325 MG/1; MG/1
TABLET ORAL
COMMUNITY

## 2020-03-11 RX ORDER — GABAPENTIN 300 MG/1
CAPSULE ORAL
Qty: 90 CAP | Refills: 2 | Status: SHIPPED | OUTPATIENT
Start: 2020-03-11

## 2020-03-11 NOTE — PATIENT INSTRUCTIONS
Cervical Spinal Stenosis: Care Instructions Your Care Instructions Spinal stenosis is a narrowing of the canal that surrounds the spinal cord and nerve roots. Sometimes bone and other tissue grow into this canal and press on the nerves that branch out from the spinal cord. This can happen as a part of aging. When the narrowing happens in your neck, it's called cervical spinal stenosis. It often causes stiffness, pain, numbness, and weakness in the neck, shoulders, arms, hands, or legs. It can even cause problems with your balance, coordination, and bowel or bladder control. But some people have no symptoms. You may be able to get relief from the symptoms of spinal stenosis by taking pain medicine. Your doctor may suggest physical therapy and exercises to keep your spine strong and flexible. Some people try steroid shots to reduce swelling. If pain and numbness in your neck, arms, or legs are still so bad that you cannot do your normal activities, you may need surgery. Follow-up care is a key part of your treatment and safety. Be sure to make and go to all appointments, and call your doctor if you are having problems. It's also a good idea to know your test results and keep a list of the medicines you take. How can you care for yourself at home? · Ask your doctor if you can take an over-the-counter pain medicine, such as acetaminophen (Tylenol), ibuprofen (Advil, Motrin), or naproxen (Aleve). Be safe with medicines. Read and follow all instructions on the label. · Do not take two or more pain medicines at the same time unless the doctor told you to. Many pain medicines have acetaminophen, which is Tylenol. Too much acetaminophen (Tylenol) can be harmful. · Change positions often when you are standing or sitting. This may reduce pressure on the spinal cord and its nerves. · When you rest, use pillows or towel rolls to support your neck and head in a comfortable position. · Follow your doctor's instructions about activity. He or she may tell you not to do sports or activities that could injure your neck. · Stretch your neck and shoulders as your doctor or physical therapist recommends. If your doctor says it is okay to do them, these exercises may help: ? Neck stretches to the side. Keep your shoulders relaxed and slowly tilt your head straight over toward one shoulder. Hold for 15 seconds. Let the weight of your head stretch your muscles. Then do the same toward the other shoulder. ? Neck rotations. Keep your chin level and slowly turn your head to one side. Hold for 15 seconds. Then do the same to the other side. ? Shoulder rolls. Roll your shoulders up, then back, and then down in a smooth, circular motion. Repeat several times. When should you call for help? Call 911 anytime you think you may need emergency care. For example, call if: 
  · You are unable to move an arm or a leg at all.  
[facility] your doctor now or seek immediate medical care if: 
  · You have new or worse symptoms in your arms, legs, belly, or buttocks. Symptoms may include: 
? Numbness or tingling. ? Weakness. ? Pain.  
  · You lose bladder or bowel control.  
 Watch closely for changes in your health, and be sure to contact your doctor if: 
  · You do not get better as expected. Where can you learn more? Go to http://regan-caitlin.info/. Enter  in the search box to learn more about \"Cervical Spinal Stenosis: Care Instructions. \" Current as of: June 26, 2019 Content Version: 12.2 © 9819-2717 [employer]. Care instructions adapted under license by [employer] (which disclaims liability or warranty for this information). If you have questions about a medical condition or this instruction, always ask your healthcare professional. [address] any warranty or liability for your use of this information. Shoulder Pain: Care Instructions Your Care Instructions You can hurt your shoulder by using it too much during an activity, such as fishing or baseball. It can also happen as part of the everyday wear and tear of getting older. Shoulder injuries can be slow to heal, but your shoulder should get better with time. Your doctor may recommend a sling to rest your shoulder. If you have injured your shoulder, you may need testing and treatment. Follow-up care is a key part of your treatment and safety. Be sure to make and go to all appointments, and call your doctor if you are having problems. It's also a good idea to know your test results and keep a list of the medicines you take. How can you care for yourself at home? · Take pain medicines exactly as directed. ? If the doctor gave you a prescription medicine for pain, take it as prescribed. ? If you are not taking a prescription pain medicine, ask your doctor if you can take an over-the-counter medicine. ? Do not take two or more pain medicines at the same time unless the doctor told you to. Many pain medicines contain acetaminophen, which is Tylenol. Too much acetaminophen (Tylenol) can be harmful. · If your doctor recommends that you wear a sling, use it as directed. Do not take it off before your doctor tells you to. · Put ice or a cold pack on the sore area for 10 to 20 minutes at a time. Put a thin cloth between the ice and your skin. · If there is no swelling, you can put moist heat, a heating pad, or a warm cloth on your shoulder. Some doctors suggest alternating between hot and cold. · Rest your shoulder for a few days. If your doctor recommends it, you can then begin gentle exercise of the shoulder, but do not lift anything heavy. When should you call for help? Call 911 anytime you think you may need emergency care. For example, call if: 
  · You have chest pain or pressure. This may occur with: ? Sweating. ? Shortness of breath. ? Nausea or vomiting. ? Pain that spreads from the chest to the neck, jaw, or one or both shoulders or arms. ? Dizziness or lightheadedness. ? A fast or uneven pulse. After calling 911, chew 1 adult-strength aspirin. Wait for an ambulance. Do not try to drive yourself.  
  · Your arm or hand is cool or pale or changes color.  
 Call your doctor now or seek immediate medical care if: 
  · You have signs of infection, such as: 
? Increased pain, swelling, warmth, or redness in your shoulder. ? Red streaks leading from a place on your shoulder. ? Pus draining from an area of your shoulder. ? Swollen lymph nodes in your neck, armpits, or groin. ? A fever.  
 Watch closely for changes in your health, and be sure to contact your doctor if: 
  · You cannot use your shoulder.  
  · Your shoulder does not get better as expected. Where can you learn more? Go to http://regan-caitlin.info/. Enter D905 in the search box to learn more about \"Shoulder Pain: Care Instructions. \" Current as of: June 26, 2019 Content Version: 12.2 © 0067-5028 [employer]. Care instructions adapted under license by [employer] (which disclaims liability or warranty for this information). If you have questions about a medical condition or this instruction, always ask your healthcare professional. [address] any warranty or liability for your use of this information.

## 2020-03-11 NOTE — PROGRESS NOTES
Verbal order entered per Dr. Madiha Stockton as documented on blue sheet:Referral to PT.  Pended Gabapentin 300mg take 1 tab po QAM and 2 tabs po QPM. Disp 90 with 2 refills

## 2020-03-11 NOTE — PROGRESS NOTES
[address]  [address], [address]  [place]  Phone: [phone]  Fax: [phone]        Brenda Bernal  : 1950  PCP: Carloz Matthews MD      NEW PATIENT      ASSESSMENT AND PLAN     Diagnoses and all orders for this visit:    1. Cervical spinal stenosis  -     REFERRAL TO PHYSICAL THERAPY  -     gabapentin (NEURONTIN) 300 mg capsule; Take 1 tab po QAM and 2 tabs po QPM    2. Bilateral carpal tunnel syndrome  -     REFERRAL TO PHYSICAL THERAPY    3. Right shoulder pain, unspecified chronicity  -     REFERRAL TO PHYSICAL THERAPY       1. Advised to stay active as tolerated. 2. Referral to PT  3. Increase Gabapentin to 300mg TID  4. Discussed urgent symptoms - would result in surgical eval  5. Avoid overhead lifting or reaching. 6. Given information on cervical stenosis, R shoulder pain    F/U 6 weeks      CHIEF COMPLAINT  Le Babb is seen today in consultation at the request of Dr. Gwynneth Nageotte for complaints of neck pain. HISTORY OF PRESENT ILLNESS  Andrea Arnett is a 68 y.o. female. RHD. Today pt c/o neck pain of 5 year duration. Pt denies any specific incident or injury that caused their pain. Pt c/o neck pain. Notes RUE tingling. Pt states she had R cortisone injection in wrist for CTS, advised sx would not fix her pain. May have sx on L CTS. Reports trouble sleeping at night due to neck pain. Reports cervicogenic headaches x few years. Admits to worsening imbalance and falls. Location of pain: neck, low back  Does pain radiate into extremities: RUE tingling. R Shoulder pain with lifting x 2 years. Between shoulder blades. Fingertip burning, itching, pain. Does patient have weakness: R>L hand drops objects  Pt denies saddle paresthesias. Medications pt is on: Gabapentin 300mg BID no benefit, no dizziness. Denies persistent fevers, chills, weight changes, neurogenic bowel or bladder symptoms.      Treatments patient has tried:  Physical therapy: Not for neck  Doing HEP: Unknown  Non-opioid medications: Yes Unable to take NSAIDs. Spinal injections: Unknown Pt had L trochanteric bursa injection 2019 Dr. Darcy Bains, no benefit. Spinal surgery- Yes. L4-5 fusion 1996 Dr. Tab Ruffin  Last L MRI 1/2020: severe stenosis L3-4, fused L4-5  Last C MRI 1/2020: mod stenosis C4-5-6  EMG 10/2019: B/L CTS L>R.  reviewed. Was with [facility], now with Dr. Richard Vela. PMHx of uterine cancer, HTN, peptic ulcer, NATHANAEL, gastric bypass, R knee replacement. Pt worked as . Now retired. Painting bathroom ceiling, walls. Admission 2/17/20 for upper endoscopy. Pain Assessment  3/11/2020   Location of Pain Neck   Location Modifiers Right   Severity of Pain 7   Quality of Pain Other (Comment)   Quality of Pain Comment numbness, tingling, & weakness   Duration of Pain Persistent   Frequency of Pain Constant   Aggravating Factors Other (Comment)   Aggravating Factors Comment turning neck side to side   Limiting Behavior Yes   Relieving Factors Nothing   Relieving Factors Comment -   Result of Injury No         MRI Results (maximum last 3): Results from [place] encounter on 01/07/20   MRI LUMB SPINE W WO CONT    Narrative MR lumbar spine with and without contrast    HISTORY: , Postlaminectomy syndrome, not elsewhere classified; prev surgery     COMPARISON: MRI 9/21/2009. TECHNIQUE: Lumbar spine scanned with axial and sagittal T1W scans, axial and  sagittal T2W scans, and with post gadolinium axial and sagittal T1W scans. Patient received 14 mL Dotarem intravenously. FINDINGS:      Laminectomy and fusion L4-5 with pedicular screws and rods. Grade 1 anterolisthesis L3-4. Vertebral body heights are normal.  No acute or chronic fractures. Multilevel disc height loss with endplate osteophytosis most severe at L3-4. Reactive chronic discogenic bone marrow changes at L3-4. No suspicious bone  marrow lesion.   Conus terminates at the L1-2 level with normal signal.    Correlation of sagittal and axial images at the level of the discs demonstrates  the following:    Lower thoracic levels T10-11 and T11-12 demonstrate disc height loss at T10-11  and disc bulges with patent central canal.    T12-L1: No significant disc pathology. Patent canal and foramina. .      L1-L2: Moderate disc height loss and circumferential disc bulge. Superimposed  right paracentral disc extrusion 7 x 9 x 8 mm extending few millimeter above and  below the level of the disc, similar to prior. There is compression on the right  ventral thecal sac. Mild facet hypertrophy and ligamentum flavum thickening. Overall mild central canal stenosis. Mild foraminal stenosis. Peggyann Gang L2-L3: Mild disc height loss and circumferential disc bulge. Mild facet  hypertrophy and ligamentum flavum thickening. Mild central canal stenosis and  foraminal stenosis. Peggyann Gang L3-L4: Grade 1 anterolisthesis. Severe disc height loss. Circumferential disc  bulge with osteophytic ridging. Severe facet hypertrophy. Severe central canal  and lateral recesses stenosis. Moderate right and severe left foraminal  stenosis. L4-L5: Laminectomy and posterolateral fusion. Patent central canal and  foramina. Peggyann Gang L5-S1: Mild disc bulge. Mild facet hypertrophy. Patent central canal. Mild right  neural foramen stenosis due to endplate osteophytes with disc bulge. Impression IMPRESSION:    1. L4-5 laminectomy with posterolateral fusion and patent canal and foramina. 2. Multilevel degenerative disc and facet joint disease at other levels with  multifactorial severe central canal stenosis at L3-4 and severe left-sided  neural foramina stenosis, slightly progressed. -Mild central canal stenosis at L1-2 and L2-3.  -Otherwise multilevel noncritical neural foraminal stenosis.    MRI CERV SPINE WO CONT    Narrative MR CERVICAL SPINE WITHOUT CONTRAST    HISTORY: Degeneration of cervical intervertebral disc    COMPARISON: MRI cervical spine 11/21/2007    TECHNIQUE: Multisequence multiplanar imaging through the cervical spine. FINDINGS:    Alignment: Grade 1 anterolisthesis C6-7. Normal lordosis otherwise  Vertebral body height: Normal  Marrow signal: Unremarkable  Disc spaces: Multilevel disc height loss    Cervicomedullary Junction: Patent  Cervical cord: No cord expansion or atrophy. Further discussed below where  relevant. On axial imaging, findings at each level are as follows:    C2/C3: Mild disc bulge. Mild right facet hypertrophy. Patent canal and foramina. C3/C4: Mild disc bulge. Moderate left facet hypertrophy. Ligamentum flavum  thickening. AP canal diameter 8 mm. . Mild left foramina stenosis. C4/C5: Disc height loss and moderate posterior disc protrusion flattening  ventral cord. Ligamentum flavum thickening. AP canal diameter 6-7 mm. Moderate  facet hypertrophy. Uncovertebral spurring. Mild foraminal stenosis. C5/C6: Posterior disc protrusion flattening ventral cord. AP canal diameter 7  mm. Moderate facet hypertrophy. Uncovertebral spurring. Moderate foraminal  stenosis. C6/C7: Grade 1 anterolisthesis. Mild disc bulge. Ligamentum flavum thickening. AP canal diameter 8-9 mm. Moderate left and mild right facet hypertrophy. Mild  foraminal stenosis. C7/T1: Mild disc bulge partially effacing ventral CSF space. Patent central  canal otherwise. Mild facet hypertrophy. Patent foramina. Other structures: There is chronic subcutaneous posterior left neck 1 cm  intermediate T1 and low T2 signal lesion, at the level of C2, probably represent  chronic sebaceous cysts with calcifications/old hemorrhage. Impression IMPRESSION:    1. Interval progression of multilevel degenerative disc disease and facet  arthropathy  -Moderate central canal stenosis at C4-5 and C5-6. Mild central canal stenosis  C3-4 and C6-7  -Multilevel mild and moderate degrees foraminal stenosis as discussed. EMG 10/2019  INTERPRETATION  This is an abnormal electrodiagnostic examination. These findings may be consistent with:  1. Moderate median mononeuropathy at the left wrist (carpal tunnel syndrome)  2. Chronic mild median mononeuropathy at the right wrist (carpal tunnel syndrome) - previously treated     There is no electrodiagnostic evidence of any cervical radiculopathy, brachial plexopathy, peripheral polyneuropathy, or any other mononeuropathy.     CLINICAL INTERPRETATION  Her electrodiagnostic findings are consistent with symptoms in her hands.        PAST MEDICAL HISTORY   Past Medical History:   Diagnosis Date    Abdominal pain     Benign essential hypertension     Cancer ([address]     uterine    Depressive disorder     Fatigue     Hypertension     Insomnia     NATHANAEL (obstructive sleep apnea)     does not use cpap machine    Osteoarthritis     Peptic ulcer 2012    Spinal stenosis     with chronic pain       Past Surgical History:   Procedure Laterality Date    HX APPENDECTOMY  1962    HX BACK SURGERY  1996    PHLD; L4-L5    HX GASTRIC BYPASS  2009    HX HYSTERECTOMY  1983    uterine cancer, stage 1    HX KNEE REPLACEMENT  2005    right    HX VISHAL AND BSO         MEDICATIONS      Current Outpatient Medications   Medication Sig Dispense Refill    oxyCODONE-acetaminophen (PERCOCET 10)  mg per tablet Take  by mouth.  gabapentin (NEURONTIN) 300 mg capsule Take 1 tab po QAM and 2 tabs po QPM 90 Cap 2    cloNIDine HCl (CATAPRES) 0.1 mg tablet Take 1 Tab by mouth every twelve (12) hours as needed (restlessness, hot flashes, sweats - PRN opiate withdrawl) for up to 8 doses. 8 Tab 0    naloxone (NARCAN) 4 mg/actuation nasal spray Use 1 spray intranasally, then discard. Repeat with new spray every 2 min as needed for opioid overdose symptoms, alternating nostrils.   Indications: Decrease in Rate & Depth of Breathing due to Opioid Drug, opioid overdose 1 Each 1    carvedilol (COREG) 12.5 mg tablet TAKE 1 TABLET BY MOUTH TWICE DAILY WITH MEALS 180 Tab 0    amLODIPine (NORVASC) 5 mg tablet TAKE 1 TABLET BY MOUTH DAILY 90 Tab 0    omeprazole (PRILOSEC OTC) 20 mg tablet Take 20 mg by mouth daily.  dicyclomine (BENTYL) 10 mg capsule Take 10 mg by mouth 4 times daily (before meals and nightly).  ARIPiprazole (ABILIFY) 10 mg tablet Take 1/2 to 1 tablet by mouth daily as directed      l-methylfolate (DEPLIN) 15 mg tablet Take 15 mg by mouth daily.  colestipol (COLESTID) 1 gram tablet Take 1 g by mouth two (2) times a day.  sertraline (ZOLOFT) 100 mg tablet Take 200 mg by mouth daily.  docusate sodium (COLACE) 100 mg capsule Take 1 Cap by mouth two (2) times daily as needed for Constipation. 60 Cap 2       ALLERGIES    Allergies   Allergen Reactions    Morphine Unknown (comments)     Hallucinations  Pt.  Denies allergy            SOCIAL HISTORY    Social History     Socioeconomic History    Marital status:      Spouse name: Not on file    Number of children: Not on file    Years of education: Not on file    Highest education level: Not on file   Occupational History    Not on file   Social Needs    Financial resource strain: Not on file    Food insecurity     Worry: Not on file     Inability: Not on file    Transportation needs     Medical: Not on file     Non-medical: Not on file   Tobacco Use    Smoking status: Current Every Day Smoker     Packs/day: 0.50     Types: Cigarettes    Smokeless tobacco: Never Used   Substance and Sexual Activity    Alcohol use: Yes     Comment: socially    Drug use: No     Comment: denies    Sexual activity: Never     Partners: Male   Lifestyle    Physical activity     Days per week: Not on file     Minutes per session: Not on file    Stress: Not on file   Relationships    Social connections     Talks on phone: Not on file     Gets together: Not on file     Attends Baptism service: Not on file     Active member of club or organization: Not on file     Attends meetings of clubs or organizations: Not on file     Relationship status: Not on file    Intimate partner violence     Fear of current or ex partner: Not on file     Emotionally abused: Not on file     Physically abused: Not on file     Forced sexual activity: Not on file   Other Topics Concern    Not on file   Social History Narrative    Not on file       FAMILY HISTORY    Family History   Problem Relation Age of Onset    Cancer Mother         lung-nonsmoker    Diabetes Other     Heart Disease Other          REVIEW OF SYSTEMS  Review of Systems   Constitutional: Negative for chills, fever and weight loss. Respiratory: Negative for shortness of breath. Cardiovascular: Negative for chest pain. Gastrointestinal: Negative for constipation. Negative for fecal incontinence   Genitourinary: Negative for dysuria. Negative for urinary incontinence   Musculoskeletal: Positive for falls. Per HPI   Skin: Negative for rash. Neurological: Positive for tingling, sensory change, focal weakness and headaches. Negative for dizziness and tremors. Endo/Heme/Allergies: Does not bruise/bleed easily. Psychiatric/Behavioral: The patient has insomnia. PHYSICAL EXAMINATION  Visit Vitals  /59 (BP 1 Location: Left arm, BP Patient Position: Sitting)   Pulse 73   Temp 98.1 °F (36.7 °C) (Oral)   Resp 16   Ht 5' 1\" (1.549 m)   Wt 146 lb (66.2 kg)   SpO2 95%   BMI 27.59 kg/m²          Accompanied by self. Constitutional:  Well developed, well nourished, in no acute distress. Psychiatric: Affect and mood are appropriate. Integumentary: No rashes or abrasions noted on exposed areas. Cardiovascular/Peripheral Vascular: No peripheral edema is noted BLE. SPINE/MUSCULOSKELETAL EXAM    Cervical spine:  Neck is midline. Normal muscle tone. No focal atrophy is noted. Limitation with R shoulder ROM. Full ROM L shoulder.  Tenderness to palpation of R anterior shoulder joint line. Tenderness to palpation R occipital notch, C7, B/L upper trapezii. Negative Spurling's sign. Positive Tinel's sign B/L wrist and R elbow. Positive Perez's sign on L. Positive drop arm on R.       MOTOR:      Biceps  Triceps Deltoids Wrist Ext Wrist Flex Hand Intrin   Right +4/5 +4/5 +4/5 +4/5 +4/5 4/5   Left +4/5 +4/5 +4/5 +4/5 +4/5 4/5   Pinch 4/5 bilaterally    DTRs are 3+ biceps, triceps, brachioradialis, 2+ patella. Unable to do tandem gait. Ambulation without assistive device. FWB. Written by Edna Quinones, as dictated by Julio C Roman MD.    I, Dr. Julio C Roman MD, confirm that all documentation is accurate. Ms. María High may have a reminder for a \"due or due soon\" health maintenance. I have asked that she contact her primary care provider for follow-up on this health maintenance.

## 2020-03-18 ENCOUNTER — HOSPITAL ENCOUNTER (OUTPATIENT)
Dept: PHYSICAL THERAPY | Age: 70
Discharge: HOME | End: 2020-03-18
Payer: MEDICARE

## 2020-03-18 PROCEDURE — 97162 PT EVAL MOD COMPLEX 30 MIN: CPT

## 2020-03-18 PROCEDURE — 97110 THERAPEUTIC EXERCISES: CPT

## 2020-03-18 NOTE — PROGRESS NOTES
PT DAILY TREATMENT NOTE/CERVICAL [id number]    Patient Name: Sam Champion  Date:3/18/2020  : 1950  [x]  Patient  Verified  Payor: Ana Boyce / Plan: [place] PART A & B / Product Type: Medicare /    In time: 10:50am  Out time: 11:40am  Total Treatment Time (min): 50  Visit #: 1 of 8    Medicare/BCBS Only   Total Timed Codes (min):  10 1:1 Treatment Time:  40     Treatment Area: Neck pain [M54.2]  Pain in right shoulder [M25.511]  Unsteadiness on feet [R26.81]    SUBJECTIVE  Pain Level (0-10 scale): 8  [x]constant []intermittent []improving [x]worsening []no change since onset    Any medication changes, allergies to medications, adverse drug reactions, diagnosis change, or new procedure performed?: [x] No    [] Yes (see summary sheet for update)  Subjective functional status/changes:     PLOF: Experienced intermittent pain in neck and B shoulders for past 10 years, however maintained (I) with all functional mobility, reaching/lifting above head, self-care tasks, ADL/IADLs and leisure activities  Limitations to PLOF: Increased pain, Radicular signs/symptoms, Headache symptoms  Mechanism of Injury: Insidious onset, began 10 years ago - neck pain into right arm. Denies hx of trauma or an event at onset. Symptoms have worsened in past 6 - 12 months - effecting her ability to perform ADLs. Current symptoms/Complaints: Constant achy pain in neck with intermittent sharp shooting pain in right shoulder/arm. Reports numbness/tingling in right hand (digits I - III). Reports increased frequency of headaches symptoms - occurring daily for 3-4hrs at a time. No dizziness to report, however has lost her balance recently - leading to a fall that occurred on 20. States associating fall from feeling overall weak in legs due to low back pain as well. Previous Treatment/Compliance:  Has not participated in skilled PT services for current condition(s); Received steroid injections with minimal relief reported  PMHx/Surgical Hx: Osteoporosis, Hearing Impaired, Tobacco Use, Abdominal Pain, HTN, Depression, Fatigue, Insomnia, Obstructive Sleep Apnea, OA, Peptic Ulcers, Spinal Stenosis, Gastric Bypass, Right TKA, Post Lateral Fusion & Laminectomy L4-L5  Pt Goals: \"to lessen the pain, be able to move better and strengthen my arm'     OBJECTIVE/EXAMINATION    Modality rationale: decrease pain and increase tissue extensibility to improve the patients ability to tolerate activities    Min Type Additional Details      []? Estim:  []? Unatt       []? IFC  []? Premod                        []?Other:  []?w/ice   []?w/heat  Position:  Location:      []? Estim: []? Att    []? TENS instruct  []? NMES                    []?Other:  []?w/US   []?w/ice   []?w/heat  Position:  Location:      []? Traction: []? Cervical       []? Lumbar                       []? Prone          []? Supine                       []?Intermittent   []? Continuous Lbs:  []? before manual  []? after manual      []? Ultrasound: []? Continuous   []? Pulsed                           []? 1MHz   []? 3MHz Location:  W/cm2:      []? Iontophoresis with dexamethasone         Location: []? Take home patch   []? In clinic    10 []? Ice     [x]? heat  []? Ice massage  []? Laser   []? Anodyne Position: Supine  Location: Neck      []? Laser with stim  []? Other: Position:  Location:      []? Vasopneumatic Device Pressure:       []? lo []? med []? hi   Temperature: []? lo []? med []? hi       10 min Therapeutic Exercise:  [x]? See flow sheet : cervical and GH mobility activities    Rationale: increase ROM and increase strength to improve the patients ability to perform ADLs with greater ease      30 min [x]?? ??Eval                  []?? ?? Re-Eval       With   [x]? ??? TE   []???? TA   []???? neuro   []???? other: Patient Education: [x]???? Review HEP    []???? Progressed/Changed HEP based on:   []???? positioning   []???? body mechanics   []???? transfers   []???? heat/ice application    []???? other:       Posture: Increased Forward Head, with B rounded shoulders; Increased thoracic kyphosis      Joint Mobility: right UPA C2-6 hypomobility; Inf/AP GH hypomobility, Inf ST hypomobility      Active Movements:   ROM % AROM Comments:pain, area   Forward flexion 75%     Extension 50% p!; right side closing restriction    SB right 25%  p! SB left  50%  p! Rotation right 50%  p! Rotation left 50%  p!      Left GH:  Flex: 120 deg   Abd: 93 deg  IR: unable   ER: unable     Right GH:  Flex: 95 deg   Abd: 75 deg  IR: unable   ER: unable      PROM:      Left GH:  Flex: 143 deg   Abd: 155 deg    Right GH:  Flex: 105 deg   Abd: 92 deg     MMT:     Left Shoulder Flex: 3+/5  Left Shoulder Abd: 3+/5  Left Shoulder ER: 3+/5  Left Shoulder IR: 3+/5     Right Shoulder Flex: 3-/5; p! Right Shoulder Abd: 3-/5; p! Right Shoulder ER: 3-/5  Right Shoulder IR: 3-/5     Palpation:  []???? Min  []???? Mod  [x]? ??? Severe    Location: B UT/LS  []???? Min  []???? Mod  [x]? ??? Severe    Location: B Suboccipitals   []???? Min  []???? Mod  [x]? ??? Severe    Location: B Cervical paraspinals, Pec Minor     Muscle Flexibility: []???? N/A              Upper Trap:     []???? WNL    [x]? ??? Tight    [x]? ??? R    []???? L              Levator:           []???? WNL    [x]? ??? Tight    [x]? ??? R    []???? L              Pect. Minor:     []???? WNL    [x]? ??? Tight    [x]? ??? R    []???? L     Cervical Retraction: [] WNL    [x] Abnormal: Decreased right side activition    Upper Limb Tension Tests:       Median: [x] R    [x] L    [x] +    [] -       Special Tests:  Cervical:        Distraction:  [x] R    [x] L    [x] +    [] -       Compression: [x] R    [x] L    [x] +    [] -    Pain Level (0-10 scale) post treatment: 6    ASSESSMENT/Changes in Function:     See POC    Patient will continue to benefit from skilled PT services to modify and progress therapeutic interventions, address functional mobility deficits, address ROM deficits, address strength deficits, analyze and address soft tissue restrictions, analyze and cue movement patterns, analyze and modify body mechanics/ergonomics and assess and modify postural abnormalities to attain remaining goals.      [x]  See Plan of Care  []  See progress note/recertification  []  See Discharge Summary         Progress towards goals / Updated goals:    See POC      PLAN  [x]  Upgrade activities as tolerated     [x]  Continue plan of care  [x]  Update interventions per flow sheet       []  Discharge due to:_  []  Other:_      Laine Keita DPT 3/18/2020  10:51 AM

## 2020-03-18 NOTE — PROGRESS NOTES
[facility]              [address]        [place], [address]   [phone] fax    Plan of Care/ Statement of Necessity for Physical Therapy Services    Patient name: Celeste Crystal Start of Care: 3/18/2020   Referral source: Mary Payton MD : 1950    Medical Diagnosis: Neck pain [M54.2]  Pain in right shoulder [M25.511]  Unsteadiness on feet [R26.81]  Payor: [place] MEDICARE / Plan: [place] MEDICARE PART A & B / Product Type: Medicare /  Onset Date: Initial onset: >10 years ago; Increase in symptom intensity: 2019    Treatment Diagnosis: Neck Pain; Pain in Right Shoulder    Prior Hospitalization: see medical history Provider#: [id number]   Medications: Verified on Patient summary List    Comorbidities: Osteoporosis, Hearing Impaired, Tobacco Use, Abdominal Pain, HTN, Depression, Fatigue, Insomnia, Obstructive Sleep Apnea, OA, Peptic Ulcers, Spinal Stenosis, Gastric Bypass, Right TKA, Post Lateral Fusion & Laminectomy L4-L5   Prior Level of Function: Experienced intermittent pain in neck and B shoulders for past 10 years, however maintained (I) with all functional mobility, reaching/lifting above head, self-care tasks, ADL/IADLs and leisure activities      The Plan of Care and following information is based on the information from the initial evaluation. Assessment/ key information:     Patient presents to clinic secondary to chonic neck and right shoulder pain. Clinical examination demonstrates increased radicular symptoms, headache symptoms, neural tension, soft tissue restrictions, joint hypomobility, decreased function (evident by FOTO score), strength, ROM , flexibility, and overall mobility limitations/compensatory movement patterns. These limitations affect the patient's ability to perform ADLs/IADLs, self care tasks and leisure activities efficiently and pain free.   The patient would benefit from skilled physical therapy interventions to address the aforementioned impairments in order to return to her PLOF of completing all functional activities pain free and independently. Evaluation Complexity History HIGH Complexity :3+ comorbidities / personal factors will impact the outcome/ POC ; Examination HIGH Complexity : 4+ Standardized tests and measures addressing body structure, function, activity limitation and / or participation in recreation  ;Presentation HIGH Complexity : Unstable and unpredictable characteristics  ; Clinical Decision Making MEDIUM Complexity : FOTO score of 26-74  Overall Complexity Rating: MEDIUM  Problem List: pain affecting function, decrease ROM, decrease strength, impaired gait/ balance, decrease ADL/ functional abilitiies, decrease activity tolerance, decrease flexibility/ joint mobility and decrease transfer abilities   Treatment Plan may include any combination of the following: Therapeutic exercise, Therapeutic activities, Neuromuscular re-education, Physical agent/modality, Gait/balance training, Manual therapy, Patient education, Self Care training, Functional mobility training, Home safety training and Stair training  Patient / Family readiness to learn indicated by: asking questions, trying to perform skills and interest  Persons(s) to be included in education: patient (P)  Barriers to Learning/Limitations: None  Patient Goal (s): \"to lessen the pain, be able to move better and strengthen my arm'   Patient Self Reported Health Status: good  Rehabilitation Potential: good     Short Term Goals: To be accomplished in 2 weeks:     1. Patient will become proficient in their HEP and will be compliant in performing that program.  Evaluation: HEP established. 2. Patient will report >/= 50-75% reduction in right UE radicular symptoms in order to return to performing functional ADLs pain free. Evaluation: 0%    3.  Patient will demonstrate pain free right shoulder flexion and abduction PROM >/= 160 degrees in order to perform ADLs with greater ease. Evaluation: Flex: 105 deg; Abd: 92 deg     Long Term Goals: To be accomplished in 6 weeks:     1. Patient's pain level will be 0-2/10 with activity in order to improve patient's ability to perform normal ADLs. Evaluation: 7-9/10 with activity?     2. Patient will demonstrate pain free cervical AROM movements WNL in order to return to performing work related tasks efficiently and effectively. Evaluation: Left SB: 50% limited p!; Left Rotation: 50% limited, Flexion: 25% limited,;Extension: 50% limited, p! With right side closing restriction; Right Rotation 50% limited, SB right: 50% limited      3. Patient will increase FOTO score to >/= 45 points to indicate increased functional mobility. Evaluation: 35 points      4. Patient will demonstrate gross right shoulder MMT >/= 4+/5 to lift objects above head pain free. Evaluation: Right Shoulder Flex: 3-/5, Right Shoulder Abd: 3-/5, Right Shoulder ER: 3-/5, Right Shoulder IR: 3-/5      5. Patient will demonstrate pain free right shoulder flexion and abduction AROM >/= 160 degrees in order to perform work related tasks pain free  Evaluation: Flex: 95 deg; Abd: 75 deg    Frequency / Duration: Patient to be seen 2 times per week for 4 weeks. Patient/ Caregiver education and instruction: Diagnosis, prognosis, self care, activity modification and exercises   [x]  Plan of care has been reviewed with PTA    Certification Period: 03/18/20 - 04/17/20  Miryam Bernal DPT 3/18/2020 3:07 PM  ________________________________________________________________________    I certify that the above Therapy Services are being furnished while the patient is under my care. I agree with the treatment plan and certify that this therapy is necessary.     [de-identified] Signature:____________Date:_________TIME:________    [employer], Date and Time must be completed for valid certification **  Please sign and return to [facility] [address] [facility] [address]        [place], [address]  [phone] fax

## 2020-03-19 ENCOUNTER — HOSPITAL ENCOUNTER (OUTPATIENT)
Dept: PHYSICAL THERAPY | Age: 70
Discharge: HOME | End: 2020-03-19
Payer: MEDICARE

## 2020-03-19 PROCEDURE — 97110 THERAPEUTIC EXERCISES: CPT

## 2020-03-19 PROCEDURE — 97140 MANUAL THERAPY 1/> REGIONS: CPT

## 2020-03-19 NOTE — PROGRESS NOTES
PT DAILY TREATMENT NOTE 10-18    Patient Name: Omar Rodriguez  Date:3/19/2020  : 1950  [x]  Patient  Verified  Payor: Krystin Penn / Plan: [place] MEDICARE PART A & B / Product Type: Medicare /    In time:11:45  Out time:12:39  Total Treatment Time (min): 54  Visit #: 2 of 8    Medicare/BCBS Only   Total Timed Codes (min):  44 1:1 Treatment Time:  44       Treatment Area: Neck pain [M54.2]  Pain in right shoulder [M25.511]  Unsteadiness on feet [R26.81]    SUBJECTIVE  Pain Level (0-10 scale): 8  Any medication changes, allergies to medications, adverse drug reactions, diagnosis change, or new procedure performed?: [x] No    [] Yes (see summary sheet for update)  Subjective functional status/changes:   [] No changes reported  Pt reports she was painting and working in her flower beds yesterday and those activities aggravated her neck and shoulder. OBJECTIVE    Modality rationale: decrease pain to improve the patients ability to decrease difficulty while performing tasks.     Min Type Additional Details    [] Estim:  []Unatt       []IFC  []Premod                        []Other:  []w/ice   []w/heat  Position:  Location:    [] Estim: []Att    []TENS instruct  []NMES                    []Other:  []w/US   []w/ice   []w/heat  Position:  Location:    []  Traction: [] Cervical       []Lumbar                       [] Prone          []Supine                       []Intermittent   []Continuous Lbs:  [] before manual  [] after manual    []  Ultrasound: []Continuous   [] Pulsed                           []1MHz   []3MHz W/cm2:  Location:    []  Iontophoresis with dexamethasone         Location: [] Take home patch   [] In clinic   10 []  Ice     [x]  heat  []  Ice massage  []  Laser   []  Anodyne Position:sitting  Location:shoulder and neck    []  Laser with stim  []  Other:  Position:  Location:    []  Vasopneumatic Device Pressure:       [] lo [] med [] hi   Temperature: [] lo [] med [] hi   [] Skin assessment post-treatment:  []intact []redness- no adverse reaction    []redness  adverse reaction:       36 min Therapeutic Exercise:  [x] See flow sheet :   Rationale: increase ROM and increase strength to improve the patients ability to increase ease with ADLs. 8 min Manual Therapy: supine-SOR, STM/TPR cervical paraspinals, UT/LT, cervical distraction, median nerve glide on right UE, PROM into shoulder flexion   Rationale: decrease pain, increase ROM, increase tissue extensibility and decrease trigger points to increase tolerance to activities. With   [] TE   [] TA   [] neuro   [] other: Patient Education: [x] Review HEP    [] Progressed/Changed HEP based on:   [] positioning   [] body mechanics   [] transfers   [] heat/ice application    [] other:      Other Objective/Functional Measures: Pt has initiated HEP     Pain Level (0-10 scale) post treatment: 8    ASSESSMENT/Changes in Function: Initiated exercises per POC. Pt has low tolerance to activities, easily has increase in pain with motion. Pt does not tolerate manual well and has increase in pain with any PROM of right shoulder. Patient will continue to benefit from skilled PT services to modify and progress therapeutic interventions, address functional mobility deficits, address ROM deficits, address strength deficits and analyze and address soft tissue restrictions to attain remaining goals. []  See Plan of Care  []  See progress note/recertification  []  See Discharge Summary         Progress towards goals / Updated goals:     1. Patient will become proficient in their HEP and will be compliant in performing that program.  Evaluation: HEP established. Current: Progressing: Pt has initiated HEP. 3/19/20     2. Patient will report >/= 50-75% reduction in right UE radicular symptoms in order to return to performing functional ADLs pain free. Evaluation: 0%     3.  Patient will demonstrate pain free right shoulder flexion and abduction PROM >/= 160 degrees in order to perform ADLs with greater ease.   Evaluation: Flex: 105 deg; Abd: 92 deg     Long Term Goals: To be accomplished in 6 weeks:     1. Patient's pain level will be 0-2/10 with activity in order to improve patient's ability to perform normal ADLs. Evaluation: 7-9/10 with activity?     2. Patient will demonstrate pain free cervical AROM movements WNL in order to return to performing work related tasks efficiently and effectively. Evaluation: Left SB: 50% limited p!; Left Rotation: 50% limited, Flexion: 25% limited,;Extension: 50% limited, p! With right side closing restriction; Right Rotation 50% limited, SB right: 50% limited      3. Patient will increase FOTO score to >/= 45 points to indicate increased functional mobility. Evaluation: 35 points      4. Patient will demonstrate gross right shoulder MMT >/= 4+/5 to lift objects above head pain free. Evaluation: Right Shoulder Flex: 3-/5, Right Shoulder Abd: 3-/5, Right Shoulder ER: 3-/5, Right Shoulder IR: 3-/5      5.  Patient will demonstrate pain free right shoulder flexion and abduction AROM >/= 160 degrees in order to perform work related tasks pain free  Evaluation: Flex: 95 deg; Abd: 75 deg    PLAN  []  Upgrade activities as tolerated     [x]  Continue plan of care  []  Update interventions per flow sheet       []  Discharge due to:_  []  Other:_      Duke Mulligan PTA 3/19/2020  11:53 AM    Future Appointments   Date Time Provider Jerica Hudson   3/24/2020 12:00 PM Sylvie Sr PT [facility]   3/26/2020 12:00 PM Sylvie Sr PT [facility]   4/1/2020 12:30 PM May Glover DPT [facility]   4/3/2020 12:00 PM May Glover DPT [facility]   4/8/2020 12:00 PM May Glover DPT [facility]   4/10/2020 12:00 PM Yinka Barriga DPT [facility]

## 2020-03-24 ENCOUNTER — APPOINTMENT (OUTPATIENT)
Dept: PHYSICAL THERAPY | Age: 70
End: 2020-03-24
Payer: MEDICARE

## 2020-03-26 ENCOUNTER — APPOINTMENT (OUTPATIENT)
Dept: PHYSICAL THERAPY | Age: 70
End: 2020-03-26
Payer: MEDICARE

## 2020-04-01 ENCOUNTER — APPOINTMENT (OUTPATIENT)
Dept: PHYSICAL THERAPY | Age: 70
End: 2020-04-01

## 2020-04-03 ENCOUNTER — APPOINTMENT (OUTPATIENT)
Dept: PHYSICAL THERAPY | Age: 70
End: 2020-04-03

## 2020-04-08 ENCOUNTER — APPOINTMENT (OUTPATIENT)
Dept: PHYSICAL THERAPY | Age: 70
End: 2020-04-08

## 2020-04-10 ENCOUNTER — APPOINTMENT (OUTPATIENT)
Dept: PHYSICAL THERAPY | Age: 70
End: 2020-04-10

## 2020-04-14 NOTE — PROGRESS NOTES
[facility]              [address]        [place], [address]   [phone] fax    Discharge Summary  Patient name: Bin Felipe Start of Care: 3/18/2020   Referral source: Leslie Quinones MD : 1950   Medical/Treatment Diagnosis: Neck pain [M54.2]  Pain in right shoulder [M25.511]  Unsteadiness on feet [R26.81]  Payor: VA MEDICARE / Plan: VA MEDICARE PART A & B / Product Type: Medicare /  Onset Date:3/2019     Prior Hospitalization: see medical history Provider#: [id number]   Medications: Verified on Patient Summary List    Comorbidities: Osteoporosis, Hearing Impaired, Tobacco Use, Abdominal Pain, HTN, Depression, Fatigue, Insomnia, Obstructive Sleep Apnea, OA, Peptic Ulcers, Spinal Stenosis, Gastric Bypass, Right TKA, Post Lateral Fusion & Laminectomy L4-L5   Prior Level of Function: Experienced intermittent pain in neck and B shoulders for past 10 years, however maintained (I) with all functional mobility, reaching/lifting above head, self-care tasks, ADL/IADLs and leisure activities    Visits from Start of Care: 2    Missed Visits: 0  Reporting Period : 3/18/2020 to 2020    Summary of Care:  Unable to further assess due to COVID-19 restrictions. Pt declined telehealth services and will be discharged from outpatient therapy at this time.       ASSESSMENT/RECOMMENDATIONS:  [x]Discontinue therapy: []Patient has reached or is progressing toward set goals      []Patient is non-compliant or has abdicated      []Due to lack of appreciable progress towards set goals    Nae Ball, PT 2020 5:19 PM

## 2020-11-13 ENCOUNTER — OFFICE VISIT (OUTPATIENT)
Dept: ORTHOPEDIC SURGERY | Age: 70
End: 2020-11-13
Payer: MEDICARE

## 2020-11-13 VITALS
SYSTOLIC BLOOD PRESSURE: 115 MMHG | TEMPERATURE: 97.2 F | WEIGHT: 155 LBS | RESPIRATION RATE: 16 BRPM | HEART RATE: 66 BPM | BODY MASS INDEX: 29.29 KG/M2 | OXYGEN SATURATION: 96 % | DIASTOLIC BLOOD PRESSURE: 56 MMHG

## 2020-11-13 DIAGNOSIS — G56.01 RIGHT CARPAL TUNNEL SYNDROME: Primary | ICD-10-CM

## 2020-11-13 DIAGNOSIS — G56.02 LEFT CARPAL TUNNEL SYNDROME: ICD-10-CM

## 2020-11-13 PROCEDURE — G8752 SYS BP LESS 140: HCPCS | Performed by: ORTHOPAEDIC SURGERY

## 2020-11-13 PROCEDURE — G9899 SCRN MAM PERF RSLTS DOC: HCPCS | Performed by: ORTHOPAEDIC SURGERY

## 2020-11-13 PROCEDURE — G8536 NO DOC ELDER MAL SCRN: HCPCS | Performed by: ORTHOPAEDIC SURGERY

## 2020-11-13 PROCEDURE — 1090F PRES/ABSN URINE INCON ASSESS: CPT | Performed by: ORTHOPAEDIC SURGERY

## 2020-11-13 PROCEDURE — 1101F PT FALLS ASSESS-DOCD LE1/YR: CPT | Performed by: ORTHOPAEDIC SURGERY

## 2020-11-13 PROCEDURE — 3017F COLORECTAL CA SCREEN DOC REV: CPT | Performed by: ORTHOPAEDIC SURGERY

## 2020-11-13 PROCEDURE — G8754 DIAS BP LESS 90: HCPCS | Performed by: ORTHOPAEDIC SURGERY

## 2020-11-13 PROCEDURE — G8419 CALC BMI OUT NRM PARAM NOF/U: HCPCS | Performed by: ORTHOPAEDIC SURGERY

## 2020-11-13 PROCEDURE — G9717 DOC PT DX DEP/BP F/U NT REQ: HCPCS | Performed by: ORTHOPAEDIC SURGERY

## 2020-11-13 PROCEDURE — 99214 OFFICE O/P EST MOD 30 MIN: CPT | Performed by: ORTHOPAEDIC SURGERY

## 2020-11-13 PROCEDURE — G8427 DOCREV CUR MEDS BY ELIG CLIN: HCPCS | Performed by: ORTHOPAEDIC SURGERY

## 2020-11-13 NOTE — PROGRESS NOTES
Alexey Chappell is a 65 y.o. female right handed individual, not currently working. Worker's Compensation and legal considerations: not known and none filed. Vitals:    11/13/20 1044   BP: (!) 115/56   Pulse: 66   Resp: 16   Temp: 97.2 °F (36.2 °C)   TempSrc: Temporal   SpO2: 96%   Weight: 155 lb (70.3 kg)   PainSc:   6   PainLoc: Hand           Chief Complaint   Patient presents with    Hand Pain     bilateral hand f/u        HPI: Patient comes in today for follow-up of her bilateral carpal tunnel syndrome right worse than left. She is had injections previously that have helped. EMG HPI:  Patient comes in today for EMG at her last visit she was given carpal tunnel braces to wear at night. Initial  HPI: Patient comes in today with complaints of bilateral hand numbness and tingling. The right side is been going on for some time and significantly worse. She says the left side is slowly bothering her.     Date of onset:  Indeterminate    Injury: No    Prior Treatment:  Yes: Comment: Bilateral carpal tunnel injections    Numbness/ Tingling: Yes: Comment: Bilateral hands thumb index and middle right worse than left    ROS: Review of Systems - General ROS: negative  Respiratory ROS: no cough, shortness of breath, or wheezing  Cardiovascular ROS: no chest pain or dyspnea on exertion  Musculoskeletal ROS: positive for - pain in hand - bilateral  Neurological ROS: positive for - numbness/tingling  Dermatological ROS: negative    Past Medical History:   Diagnosis Date    Abdominal pain     Benign essential hypertension     Cancer (HCC)     uterine    Depressive disorder     Fatigue     Hypertension     Insomnia     NATHANAEL (obstructive sleep apnea)     does not use cpap machine    Osteoarthritis     Peptic ulcer 2012    Spinal stenosis     with chronic pain       Past Surgical History:   Procedure Laterality Date    HX APPENDECTOMY  1962    HX BACK SURGERY  1996    PHLD; L4-L5    HX GASTRIC BYPASS 2009    HX HYSTERECTOMY  1983    uterine cancer, stage 1    HX KNEE REPLACEMENT  2005    right    HX VISHAL AND BSO         Current Outpatient Medications   Medication Sig Dispense Refill    oxyCODONE-acetaminophen (PERCOCET 10)  mg per tablet Take  by mouth.  gabapentin (NEURONTIN) 300 mg capsule Take 1 tab po QAM and 2 tabs po QPM 90 Cap 2    cloNIDine HCl (CATAPRES) 0.1 mg tablet Take 1 Tab by mouth every twelve (12) hours as needed (restlessness, hot flashes, sweats - PRN opiate withdrawl) for up to 8 doses. 8 Tab 0    naloxone (NARCAN) 4 mg/actuation nasal spray Use 1 spray intranasally, then discard. Repeat with new spray every 2 min as needed for opioid overdose symptoms, alternating nostrils. Indications: Decrease in Rate & Depth of Breathing due to Opioid Drug, opioid overdose 1 Each 1    carvedilol (COREG) 12.5 mg tablet TAKE 1 TABLET BY MOUTH TWICE DAILY WITH MEALS 180 Tab 0    docusate sodium (COLACE) 100 mg capsule Take 1 Cap by mouth two (2) times daily as needed for Constipation. 60 Cap 2    amLODIPine (NORVASC) 5 mg tablet TAKE 1 TABLET BY MOUTH DAILY 90 Tab 0    omeprazole (PRILOSEC OTC) 20 mg tablet Take 20 mg by mouth daily.  dicyclomine (BENTYL) 10 mg capsule Take 10 mg by mouth 4 times daily (before meals and nightly).  ARIPiprazole (ABILIFY) 10 mg tablet Take 1/2 to 1 tablet by mouth daily as directed      l-methylfolate (DEPLIN) 15 mg tablet Take 15 mg by mouth daily.  colestipol (COLESTID) 1 gram tablet Take 1 g by mouth two (2) times a day.  sertraline (ZOLOFT) 100 mg tablet Take 200 mg by mouth daily. Allergies   Allergen Reactions    Morphine Unknown (comments)     Hallucinations  Pt. Denies allergy           PE:     Physical Exam  Vitals signs and nursing note reviewed. Constitutional:       General: She is not in acute distress. Appearance: Normal appearance. She is not ill-appearing, toxic-appearing or diaphoretic.    HENT: Head: Normocephalic and atraumatic. Nose: Nose normal.      Mouth/Throat:      Mouth: Mucous membranes are moist.   Eyes:      Extraocular Movements: Extraocular movements intact. Pupils: Pupils are equal, round, and reactive to light. Neck:      Musculoskeletal: Normal range of motion and neck supple. Cardiovascular:      Pulses: Normal pulses. Pulmonary:      Effort: Pulmonary effort is normal. No respiratory distress. Abdominal:      General: Abdomen is flat. There is no distension. Musculoskeletal: Normal range of motion. General: Tenderness present. No swelling, deformity or signs of injury. Right lower leg: No edema. Left lower leg: No edema. Skin:     General: Skin is warm and dry. Capillary Refill: Capillary refill takes less than 2 seconds. Findings: No bruising or erythema. Neurological:      General: No focal deficit present. Mental Status: She is alert and oriented to person, place, and time. Cranial Nerves: No cranial nerve deficit. Sensory: No sensory deficit. Psychiatric:         Mood and Affect: Mood normal.         Behavior: Behavior normal.         NEUROVASCULAR    Examination L R Examination L R   Carpal Comp. + + Pronator Comp. - -   Carpal Tinel + + Pronator Tinel - -   Phalen's + + Pronator Stress - -   Cubital Comp. - - Guyon Comp. - -   Cubital Tinel - - Guyon Tinel - -   Elbow Hyperflexion - - Adson's - -   Spurling's - - SC Comp. - -   PCB Median abn - - SC Tinel - -   Radial Tinel - - IC Comp. - -   Digital Tinel - - IC Tinel - -   Radial 2-Pt WNL WNL Ulnar 2-Pt WNL WNL     Radial Pulse: 2+  Capillary Refill: < 2 sec  Nick: Not Performed  [employer]: Not Performed      Imaging: None indicated      NCV & EMG Findings:  Evaluation of the left median motor nerve showed prolonged distal onset latency (4.3 ms), reduced amplitude (2.8 mV), and decreased conduction velocity (Elbow-Wrist, 45 m/s).   The right median motor nerve showed reduced amplitude (2.8 mV). The left median sensory nerve showed no response (Wrist) and no response (Elbow). The right median sensory nerve showed no response (Wrist). All remaining nerves (as indicated in the following tables) were within normal limits. All left vs. right side differences were within normal limits.       Needle evaluation of the right abductor pollicis brevis muscle showed increased insertional activity, moderately increased spontaneous activity, and increased motor unit amplitude. All remaining muscles (as indicated in the following table) showed no evidence of electrical instability.       INTERPRETATION  This is an abnormal electrodiagnostic examination. These findings may be consistent with:  1. Moderate median mononeuropathy at the left wrist (carpal tunnel syndrome)  2. Chronic mild median mononeuropathy at the right wrist (carpal tunnel syndrome) - previously treated     There is no electrodiagnostic evidence of any cervical radiculopathy, brachial plexopathy, peripheral polyneuropathy, or any other mononeuropathy.     CLINICAL INTERPRETATION  Her electrodiagnostic findings are consistent with symptoms in her hands. ICD-10-CM ICD-9-CM    1. Right carpal tunnel syndrome  G56.01 354.0 SCHEDULE SURGERY   2. Left carpal tunnel syndrome  G56.02 354.0        Plan:     Schedule right carpal tunnel release [facility]    I offered her injection for the left side but she would like to wait at this time. This procedure has been fully reviewed with the patient and written informed consent has been obtained. The patient was counseled at length about the risks of violetta Covid-19 during their perioperative period and any recovery window from their procedure. The patient was made aware that violetta Covid-19  may worsen their prognosis for recovering from their procedure and lend to a higher morbidity and/or mortality risk.   All material risks, benefits, and reasonable alternatives including postponing the procedure were discussed. The patient does  wish to proceed with the procedure at this time. Follow-up and Dispositions    · Return for 2 weeks postop.          Plan was reviewed with patient, who verbalized agreement and understanding of the plan

## 2020-11-25 DIAGNOSIS — Z01.818 PREOP EXAMINATION: Primary | ICD-10-CM

## 2020-12-23 ENCOUNTER — OFFICE VISIT (OUTPATIENT)
Dept: ORTHOPEDIC SURGERY | Age: 70
End: 2020-12-23

## 2020-12-23 VITALS
OXYGEN SATURATION: 90 % | HEIGHT: 61 IN | HEART RATE: 61 BPM | BODY MASS INDEX: 29.83 KG/M2 | TEMPERATURE: 97.5 F | RESPIRATION RATE: 16 BRPM | SYSTOLIC BLOOD PRESSURE: 135 MMHG | DIASTOLIC BLOOD PRESSURE: 69 MMHG | WEIGHT: 158 LBS

## 2020-12-23 DIAGNOSIS — G56.01 RIGHT CARPAL TUNNEL SYNDROME: Primary | ICD-10-CM

## 2020-12-23 DIAGNOSIS — Z01.818 PREOP EXAMINATION: ICD-10-CM

## 2020-12-23 NOTE — H&P
Airam Francisco is a 65 y.o. female right handed individual, not currently working. Worker's Compensation and legal considerations: not known and none filed.         Vitals:     12/23/20 1110   BP: 135/69   Pulse: 61   Resp: 16   Temp: 97.5 °F (36.4 °C)   SpO2: 90%   Weight: 158 lb (71.7 kg)   Height: 5' 1\" (1.549 m)   PainSc:   0 - No pain                    Chief Complaint   Patient presents with    Pre-op Exam       right wrist         HPI: Patient comes in today for preoperative history and physical for right carpal tunnel release. She denies any change from previous visit.     Previous HPI: Patient comes in today for follow-up of her bilateral carpal tunnel syndrome right worse than left. She is had injections previously that have helped.     EMG HPI:  Patient comes in today for EMG at her last visit she was given carpal tunnel braces to wear at night.     Initial  HPI: Patient comes in today with complaints of bilateral hand numbness and tingling. The right side is been going on for some time and significantly worse.   She says the left side is slowly bothering her.     Date of onset:  Indeterminate     Injury: No     Prior Treatment:  Yes: Comment: Bilateral carpal tunnel injections     Numbness/ Tingling: Yes: Comment: Bilateral hands thumb index and middle right worse than left     ROS: Review of Systems - General ROS: negative  Respiratory ROS: no cough, shortness of breath, or wheezing  Cardiovascular ROS: no chest pain or dyspnea on exertion  Musculoskeletal ROS: positive for - pain in hand - bilateral  Neurological ROS: positive for - numbness/tingling  Dermatological ROS: negative          Past Medical History:   Diagnosis Date    Abdominal pain      Benign essential hypertension      Cancer (HCC)       uterine    Depressive disorder      Fatigue      Hypertension      Insomnia      NATHANAEL (obstructive sleep apnea)       does not use cpap machine    Osteoarthritis      Peptic ulcer 2012  Spinal stenosis       with chronic pain         Surgical History         Past Surgical History:   Procedure Laterality Date    HX APPENDECTOMY   1962    HX BACK SURGERY   1996     PHLD; L4-L5    HX GASTRIC BYPASS   2009    HX HYSTERECTOMY   1983     uterine cancer, stage 1    HX KNEE REPLACEMENT   2005     right    HX VISHAL AND BSO                       Current Outpatient Medications   Medication Sig Dispense Refill    oxyCODONE-acetaminophen (PERCOCET 10)  mg per tablet Take  by mouth.        gabapentin (NEURONTIN) 300 mg capsule Take 1 tab po QAM and 2 tabs po QPM 90 Cap 2    cloNIDine HCl (CATAPRES) 0.1 mg tablet Take 1 Tab by mouth every twelve (12) hours as needed (restlessness, hot flashes, sweats - PRN opiate withdrawl) for up to 8 doses. 8 Tab 0    naloxone (NARCAN) 4 mg/actuation nasal spray Use 1 spray intranasally, then discard. Repeat with new spray every 2 min as needed for opioid overdose symptoms, alternating nostrils. Indications: Decrease in Rate & Depth of Breathing due to Opioid Drug, opioid overdose 1 Each 1    carvedilol (COREG) 12.5 mg tablet TAKE 1 TABLET BY MOUTH TWICE DAILY WITH MEALS 180 Tab 0    docusate sodium (COLACE) 100 mg capsule Take 1 Cap by mouth two (2) times daily as needed for Constipation. 60 Cap 2    amLODIPine (NORVASC) 5 mg tablet TAKE 1 TABLET BY MOUTH DAILY 90 Tab 0    omeprazole (PRILOSEC OTC) 20 mg tablet Take 20 mg by mouth daily.        dicyclomine (BENTYL) 10 mg capsule Take 10 mg by mouth 4 times daily (before meals and nightly).         ARIPiprazole (ABILIFY) 10 mg tablet Take 1/2 to 1 tablet by mouth daily as directed        l-methylfolate (DEPLIN) 15 mg tablet Take 15 mg by mouth daily.        colestipol (COLESTID) 1 gram tablet Take 1 g by mouth two (2) times a day.        sertraline (ZOLOFT) 100 mg tablet Take 200 mg by mouth daily.                   Allergies   Allergen Reactions    Morphine Unknown (comments)       Hallucinations                  PE:      Physical Exam  Vitals signs and nursing note reviewed. Constitutional:       General: She is not in acute distress. Appearance: Normal appearance. She is not ill-appearing, toxic-appearing or diaphoretic. HENT:      Head: Normocephalic and atraumatic. Nose: Nose normal.      Mouth/Throat:      Mouth: Mucous membranes are moist.   Eyes:      Extraocular Movements: Extraocular movements intact. Pupils: Pupils are equal, round, and reactive to light. Neck:      Musculoskeletal: Normal range of motion and neck supple. Cardiovascular:      Pulses: Normal pulses. Pulmonary:      Effort: Pulmonary effort is normal. No respiratory distress. Abdominal:      General: Abdomen is flat. There is no distension. Musculoskeletal: Normal range of motion. General: Tenderness present. No swelling, deformity or signs of injury. Right lower leg: No edema. Left lower leg: No edema. Skin:     General: Skin is warm and dry. Capillary Refill: Capillary refill takes less than 2 seconds. Findings: No bruising or erythema. Neurological:      General: No focal deficit present. Mental Status: She is alert and oriented to person, place, and time. Cranial Nerves: No cranial nerve deficit. Sensory: No sensory deficit. Psychiatric:         Mood and Affect: Mood normal.         Behavior: Behavior normal.            NEUROVASCULAR     Examination L R Examination L R   Carpal Comp. + + Pronator Comp. - -   Carpal Tinel + + Pronator Tinel - -   Phalen's + + Pronator Stress - -   Cubital Comp. - - Guyon Comp. - -   Cubital Tinel - - Guyon Tinel - -   Elbow Hyperflexion - - Adson's - -   Spurling's - - SC Comp. - -   PCB Median abn - - SC Tinel - -   Radial Tinel - - IC Comp.  - -   Digital Tinel - - IC Tinel - -   Radial 2-Pt WNL WNL Ulnar 2-Pt WNL WNL      Radial Pulse: 2+  Capillary Refill: < 2 sec  Nick: Not Performed  Digital Nick: Not Performed        Imaging: None indicated        NCV & EMG Findings:  Evaluation of the left median motor nerve showed prolonged distal onset latency (4.3 ms), reduced amplitude (2.8 mV), and decreased conduction velocity (Elbow-Wrist, 45 m/s).  The right median motor nerve showed reduced amplitude (2.8 mV).  The left median sensory nerve showed no response (Wrist) and no response (Elbow).  The right median sensory nerve showed no response (Wrist).  All remaining nerves (as indicated in the following tables) were within normal limits.  All left vs. right side differences were within normal limits.       Needle evaluation of the right abductor pollicis brevis muscle showed increased insertional activity, moderately increased spontaneous activity, and increased motor unit amplitude.  All remaining muscles (as indicated in the following table) showed no evidence of electrical instability.       INTERPRETATION  This is an abnormal electrodiagnostic examination. These findings may be consistent with:  1. Moderate median mononeuropathy at the left wrist (carpal tunnel syndrome)  2. Chronic mild median mononeuropathy at the right wrist (carpal tunnel syndrome) - previously treated     There is no electrodiagnostic evidence of any cervical radiculopathy, brachial plexopathy, peripheral polyneuropathy, or any other mononeuropathy.     CLINICAL INTERPRETATION  Her electrodiagnostic findings are consistent with symptoms in her hands.          ICD-10-CM ICD-9-CM     1. Right carpal tunnel syndrome  G56.01 354. 0           Plan:      Proceed as scheduled for right carpal tunnel release at [facility]     This procedure has been fully reviewed with the patient and written informed consent has been obtained.     The patient was counseled at length about the risks of violetta Covid-19 during their perioperative period and any recovery window from their procedure.  The patient was made aware that violetta Covid-19  may worsen their prognosis for recovering from their procedure and lend to a higher morbidity and/or mortality risk.  All material risks, benefits, and reasonable alternatives including postponing the procedure were discussed.  The patient does  wish to proceed with the procedure at this time.     Follow-up and Dispositions    · Return for As scheduled.            Plan was reviewed with patient, who verbalized agreement and understanding of the plan

## 2020-12-23 NOTE — PROGRESS NOTES
Mandy Fletcher is a 65 y.o. female right handed individual, not currently working. Worker's Compensation and legal considerations: not known and none filed. Vitals:    12/23/20 1110   BP: 135/69   Pulse: 61   Resp: 16   Temp: 97.5 °F (36.4 °C)   SpO2: 90%   Weight: 158 lb (71.7 kg)   Height: 5' 1\" (1.549 m)   PainSc:   0 - No pain           Chief Complaint   Patient presents with    Pre-op Exam     right wrist       HPI: Patient comes in today for preoperative history and physical for right carpal tunnel release. She denies any change from previous visit. Previous HPI: Patient comes in today for follow-up of her bilateral carpal tunnel syndrome right worse than left. She is had injections previously that have helped. EMG HPI:  Patient comes in today for EMG at her last visit she was given carpal tunnel braces to wear at night. Initial  HPI: Patient comes in today with complaints of bilateral hand numbness and tingling. The right side is been going on for some time and significantly worse. She says the left side is slowly bothering her.     Date of onset:  Indeterminate    Injury: No    Prior Treatment:  Yes: Comment: Bilateral carpal tunnel injections    Numbness/ Tingling: Yes: Comment: Bilateral hands thumb index and middle right worse than left    ROS: Review of Systems - General ROS: negative  Respiratory ROS: no cough, shortness of breath, or wheezing  Cardiovascular ROS: no chest pain or dyspnea on exertion  Musculoskeletal ROS: positive for - pain in hand - bilateral  Neurological ROS: positive for - numbness/tingling  Dermatological ROS: negative    Past Medical History:   Diagnosis Date    Abdominal pain     Benign essential hypertension     Cancer (HCC)     uterine    Depressive disorder     Fatigue     Hypertension     Insomnia     NATHANAEL (obstructive sleep apnea)     does not use cpap machine    Osteoarthritis     Peptic ulcer 2012    Spinal stenosis     with chronic pain Past Surgical History:   Procedure Laterality Date    HX APPENDECTOMY  1962    HX BACK SURGERY  1996    PHLD; L4-L5    HX GASTRIC BYPASS  2009    HX HYSTERECTOMY  1983    uterine cancer, stage 1    HX KNEE REPLACEMENT  2005    right    HX VISHAL AND BSO         Current Outpatient Medications   Medication Sig Dispense Refill    oxyCODONE-acetaminophen (PERCOCET 10)  mg per tablet Take  by mouth.  gabapentin (NEURONTIN) 300 mg capsule Take 1 tab po QAM and 2 tabs po QPM 90 Cap 2    cloNIDine HCl (CATAPRES) 0.1 mg tablet Take 1 Tab by mouth every twelve (12) hours as needed (restlessness, hot flashes, sweats - PRN opiate withdrawl) for up to 8 doses. 8 Tab 0    naloxone (NARCAN) 4 mg/actuation nasal spray Use 1 spray intranasally, then discard. Repeat with new spray every 2 min as needed for opioid overdose symptoms, alternating nostrils. Indications: Decrease in Rate & Depth of Breathing due to Opioid Drug, opioid overdose 1 Each 1    carvedilol (COREG) 12.5 mg tablet TAKE 1 TABLET BY MOUTH TWICE DAILY WITH MEALS 180 Tab 0    docusate sodium (COLACE) 100 mg capsule Take 1 Cap by mouth two (2) times daily as needed for Constipation. 60 Cap 2    amLODIPine (NORVASC) 5 mg tablet TAKE 1 TABLET BY MOUTH DAILY 90 Tab 0    omeprazole (PRILOSEC OTC) 20 mg tablet Take 20 mg by mouth daily.  dicyclomine (BENTYL) 10 mg capsule Take 10 mg by mouth 4 times daily (before meals and nightly).  ARIPiprazole (ABILIFY) 10 mg tablet Take 1/2 to 1 tablet by mouth daily as directed      l-methylfolate (DEPLIN) 15 mg tablet Take 15 mg by mouth daily.  colestipol (COLESTID) 1 gram tablet Take 1 g by mouth two (2) times a day.  sertraline (ZOLOFT) 100 mg tablet Take 200 mg by mouth daily. Allergies   Allergen Reactions    Morphine Unknown (comments)     Hallucinations             PE:     Physical Exam  Vitals signs and nursing note reviewed.    Constitutional:       General: She is not in acute distress. Appearance: Normal appearance. She is not ill-appearing, toxic-appearing or diaphoretic. HENT:      Head: Normocephalic and atraumatic. Nose: Nose normal.      Mouth/Throat:      Mouth: Mucous membranes are moist.   Eyes:      Extraocular Movements: Extraocular movements intact. Pupils: Pupils are equal, round, and reactive to light. Neck:      Musculoskeletal: Normal range of motion and neck supple. Cardiovascular:      Pulses: Normal pulses. Pulmonary:      Effort: Pulmonary effort is normal. No respiratory distress. Abdominal:      General: Abdomen is flat. There is no distension. Musculoskeletal: Normal range of motion. General: Tenderness present. No swelling, deformity or signs of injury. Right lower leg: No edema. Left lower leg: No edema. Skin:     General: Skin is warm and dry. Capillary Refill: Capillary refill takes less than 2 seconds. Findings: No bruising or erythema. Neurological:      General: No focal deficit present. Mental Status: She is alert and oriented to person, place, and time. Cranial Nerves: No cranial nerve deficit. Sensory: No sensory deficit. Psychiatric:         Mood and Affect: Mood normal.         Behavior: Behavior normal.         NEUROVASCULAR    Examination L R Examination L R   Carpal Comp. + + Pronator Comp. - -   Carpal Tinel + + Pronator Tinel - -   Phalen's + + Pronator Stress - -   Cubital Comp. - - Guyon Comp. - -   Cubital Tinel - - Guyon Tinel - -   Elbow Hyperflexion - - Adson's - -   Spurling's - - SC Comp. - -   PCB Median abn - - SC Tinel - -   Radial Tinel - - IC Comp.  - -   Digital Tinel - - IC Tinel - -   Radial 2-Pt WNL WNL Ulnar 2-Pt WNL WNL     Radial Pulse: 2+  Capillary Refill: < 2 sec  Nick: Not Performed  [employer]: Not Performed      Imaging: None indicated      NCV & EMG Findings:  Evaluation of the left median motor nerve showed prolonged distal onset latency (4.3 ms), reduced amplitude (2.8 mV), and decreased conduction velocity (Elbow-Wrist, 45 m/s). The right median motor nerve showed reduced amplitude (2.8 mV). The left median sensory nerve showed no response (Wrist) and no response (Elbow). The right median sensory nerve showed no response (Wrist). All remaining nerves (as indicated in the following tables) were within normal limits. All left vs. right side differences were within normal limits.       Needle evaluation of the right abductor pollicis brevis muscle showed increased insertional activity, moderately increased spontaneous activity, and increased motor unit amplitude. All remaining muscles (as indicated in the following table) showed no evidence of electrical instability.       INTERPRETATION  This is an abnormal electrodiagnostic examination. These findings may be consistent with:  1. Moderate median mononeuropathy at the left wrist (carpal tunnel syndrome)  2. Chronic mild median mononeuropathy at the right wrist (carpal tunnel syndrome) - previously treated     There is no electrodiagnostic evidence of any cervical radiculopathy, brachial plexopathy, peripheral polyneuropathy, or any other mononeuropathy.     CLINICAL INTERPRETATION  Her electrodiagnostic findings are consistent with symptoms in her hands. ICD-10-CM ICD-9-CM    1. Right carpal tunnel syndrome  G56.01 354.0        Plan:     Proceed as scheduled for right carpal tunnel release at [facility]    This procedure has been fully reviewed with the patient and written informed consent has been obtained. The patient was counseled at length about the risks of violetta Covid-19 during their perioperative period and any recovery window from their procedure. The patient was made aware that violetta Covid-19  may worsen their prognosis for recovering from their procedure and lend to a higher morbidity and/or mortality risk.   All material risks, benefits, and reasonable alternatives including postponing the procedure were discussed. The patient does  wish to proceed with the procedure at this time. Follow-up and Dispositions    · Return for As scheduled.          Plan was reviewed with patient, who verbalized agreement and understanding of the plan

## 2020-12-24 ENCOUNTER — HOSPITAL ENCOUNTER (OUTPATIENT)
Dept: PREADMISSION TESTING | Age: 70
Discharge: HOME | End: 2020-12-24
Payer: MEDICARE

## 2020-12-24 DIAGNOSIS — Z01.818 PREOP EXAMINATION: ICD-10-CM

## 2020-12-24 LAB
ALBUMIN SERPL-MCNC: 3.4 G/DL (ref 3.4–5)
ALBUMIN/GLOB SERPL: 1.1 {RATIO} (ref 0.8–1.7)
ALP SERPL-CCNC: 87 U/L (ref 45–117)
ALT SERPL-CCNC: 21 U/L (ref 13–56)
ANION GAP SERPL CALC-SCNC: 4 MMOL/L (ref 3–18)
AST SERPL-CCNC: 16 U/L (ref 10–38)
ATRIAL RATE: 67 BPM
BASOPHILS # BLD: 0 K/UL (ref 0–0.1)
BASOPHILS NFR BLD: 0 % (ref 0–2)
BILIRUB SERPL-MCNC: 0.3 MG/DL (ref 0.2–1)
BUN SERPL-MCNC: 16 MG/DL (ref 7–18)
BUN/CREAT SERPL: 18 (ref 12–20)
CALCIUM SERPL-MCNC: 8.9 MG/DL (ref 8.5–10.1)
CALCULATED P AXIS, ECG09: 61 DEGREES
CALCULATED R AXIS, ECG10: 3 DEGREES
CALCULATED T AXIS, ECG11: 44 DEGREES
CHLORIDE SERPL-SCNC: 108 MMOL/L (ref 100–111)
CO2 SERPL-SCNC: 33 MMOL/L (ref 21–32)
CREAT SERPL-MCNC: 0.9 MG/DL (ref 0.6–1.3)
DIAGNOSIS, 93000: NORMAL
DIFFERENTIAL METHOD BLD: ABNORMAL
EOSINOPHIL # BLD: 0.2 K/UL (ref 0–0.4)
EOSINOPHIL NFR BLD: 3 % (ref 0–5)
ERYTHROCYTE [DISTWIDTH] IN BLOOD BY AUTOMATED COUNT: 14 % (ref 11.6–14.5)
GLOBULIN SER CALC-MCNC: 3.2 G/DL (ref 2–4)
GLUCOSE SERPL-MCNC: 99 MG/DL (ref 74–99)
HCT VFR BLD AUTO: 38.6 % (ref 35–45)
HGB BLD-MCNC: 12.6 G/DL (ref 12–16)
LYMPHOCYTES # BLD: 3.1 K/UL (ref 0.9–3.6)
LYMPHOCYTES NFR BLD: 43 % (ref 21–52)
MCH RBC QN AUTO: 30.1 PG (ref 24–34)
MCHC RBC AUTO-ENTMCNC: 32.6 G/DL (ref 31–37)
MCV RBC AUTO: 92.3 FL (ref 74–97)
MONOCYTES # BLD: 0.5 K/UL (ref 0.05–1.2)
MONOCYTES NFR BLD: 6 % (ref 3–10)
NEUTS SEG # BLD: 3.4 K/UL (ref 1.8–8)
NEUTS SEG NFR BLD: 48 % (ref 40–73)
P-R INTERVAL, ECG05: 160 MS
PLATELET # BLD AUTO: 217 K/UL (ref 135–420)
PMV BLD AUTO: 10.8 FL (ref 9.2–11.8)
POTASSIUM SERPL-SCNC: 4.6 MMOL/L (ref 3.5–5.5)
PROT SERPL-MCNC: 6.6 G/DL (ref 6.4–8.2)
Q-T INTERVAL, ECG07: 416 MS
QRS DURATION, ECG06: 74 MS
QTC CALCULATION (BEZET), ECG08: 439 MS
RBC # BLD AUTO: 4.18 M/UL (ref 4.2–5.3)
SODIUM SERPL-SCNC: 145 MMOL/L (ref 136–145)
VENTRICULAR RATE, ECG03: 67 BPM
WBC # BLD AUTO: 7.2 K/UL (ref 4.6–13.2)

## 2020-12-24 PROCEDURE — 80053 COMPREHEN METABOLIC PANEL: CPT

## 2020-12-24 PROCEDURE — 85025 COMPLETE CBC W/AUTO DIFF WBC: CPT

## 2020-12-24 PROCEDURE — 93005 ELECTROCARDIOGRAM TRACING: CPT

## 2020-12-24 PROCEDURE — 87635 SARS-COV-2 COVID-19 AMP PRB: CPT

## 2020-12-24 PROCEDURE — 36415 COLL VENOUS BLD VENIPUNCTURE: CPT

## 2020-12-25 LAB — SARS-COV-2, COV2NT: NOT DETECTED

## 2020-12-28 ENCOUNTER — ANESTHESIA EVENT (OUTPATIENT)
Dept: SURGERY | Age: 70
End: 2020-12-28
Payer: MEDICARE

## 2020-12-28 RX ORDER — BISMUTH SUBSALICYLATE 262 MG
1 TABLET,CHEWABLE ORAL DAILY
COMMUNITY

## 2020-12-28 RX ORDER — OXYBUTYNIN CHLORIDE 5 MG/1
5 TABLET ORAL 3 TIMES DAILY
COMMUNITY

## 2020-12-29 ENCOUNTER — ANESTHESIA (OUTPATIENT)
Dept: SURGERY | Age: 70
End: 2020-12-29
Payer: MEDICARE

## 2020-12-29 ENCOUNTER — HOSPITAL ENCOUNTER (OUTPATIENT)
Age: 70
Setting detail: OUTPATIENT SURGERY
Discharge: HOME | End: 2020-12-29
Attending: ORTHOPAEDIC SURGERY | Admitting: ORTHOPAEDIC SURGERY
Payer: MEDICARE

## 2020-12-29 VITALS
HEIGHT: 66 IN | TEMPERATURE: 97.1 F | SYSTOLIC BLOOD PRESSURE: 155 MMHG | BODY MASS INDEX: 25.04 KG/M2 | WEIGHT: 155.8 LBS | RESPIRATION RATE: 14 BRPM | DIASTOLIC BLOOD PRESSURE: 76 MMHG | HEART RATE: 77 BPM | OXYGEN SATURATION: 94 %

## 2020-12-29 PROCEDURE — 74011000250 HC RX REV CODE- 250: Performed by: NURSE ANESTHETIST, CERTIFIED REGISTERED

## 2020-12-29 PROCEDURE — 77030040361 HC SLV COMPR DVT MDII -B: Performed by: ORTHOPAEDIC SURGERY

## 2020-12-29 PROCEDURE — 77030006689 HC BLD OPHTH BVR BD -A: Performed by: ORTHOPAEDIC SURGERY

## 2020-12-29 PROCEDURE — 99100 ANES PT EXTEME AGE<1 YR&>70: CPT | Performed by: ANESTHESIOLOGY

## 2020-12-29 PROCEDURE — 74011250636 HC RX REV CODE- 250/636: Performed by: NURSE ANESTHETIST, CERTIFIED REGISTERED

## 2020-12-29 PROCEDURE — 2709999900 HC NON-CHARGEABLE SUPPLY: Performed by: ORTHOPAEDIC SURGERY

## 2020-12-29 PROCEDURE — 74011000250 HC RX REV CODE- 250: Performed by: ORTHOPAEDIC SURGERY

## 2020-12-29 PROCEDURE — 74011250637 HC RX REV CODE- 250/637: Performed by: NURSE ANESTHETIST, CERTIFIED REGISTERED

## 2020-12-29 PROCEDURE — 76210000006 HC OR PH I REC 0.5 TO 1 HR: Performed by: ORTHOPAEDIC SURGERY

## 2020-12-29 PROCEDURE — 76060000032 HC ANESTHESIA 0.5 TO 1 HR: Performed by: ORTHOPAEDIC SURGERY

## 2020-12-29 PROCEDURE — 74011250636 HC RX REV CODE- 250/636: Performed by: ORTHOPAEDIC SURGERY

## 2020-12-29 PROCEDURE — 01810 ANES PX NRV MUSC F/ARM WRST: CPT | Performed by: ANESTHESIOLOGY

## 2020-12-29 PROCEDURE — 77030010813: Performed by: ORTHOPAEDIC SURGERY

## 2020-12-29 PROCEDURE — 64721 CARPAL TUNNEL SURGERY: CPT | Performed by: ORTHOPAEDIC SURGERY

## 2020-12-29 PROCEDURE — 77030040922 HC BLNKT HYPOTHRM STRY -A: Performed by: ORTHOPAEDIC SURGERY

## 2020-12-29 PROCEDURE — 76210000020 HC REC RM PH II FIRST 0.5 HR: Performed by: ORTHOPAEDIC SURGERY

## 2020-12-29 PROCEDURE — 74011000272 HC RX REV CODE- 272: Performed by: ORTHOPAEDIC SURGERY

## 2020-12-29 PROCEDURE — 77030040356 HC CORD BPLR FRCP COVD -A: Performed by: ORTHOPAEDIC SURGERY

## 2020-12-29 PROCEDURE — 76010000138 HC OR TIME 0.5 TO 1 HR: Performed by: ORTHOPAEDIC SURGERY

## 2020-12-29 RX ORDER — SODIUM CHLORIDE 0.9 % (FLUSH) 0.9 %
5-40 SYRINGE (ML) INJECTION AS NEEDED
Status: DISCONTINUED | OUTPATIENT
Start: 2020-12-29 | End: 2020-12-29 | Stop reason: HOSPADM

## 2020-12-29 RX ORDER — SODIUM CHLORIDE 0.9 % (FLUSH) 0.9 %
5-40 SYRINGE (ML) INJECTION EVERY 8 HOURS
Status: DISCONTINUED | OUTPATIENT
Start: 2020-12-29 | End: 2020-12-29 | Stop reason: HOSPADM

## 2020-12-29 RX ORDER — SODIUM CHLORIDE, SODIUM LACTATE, POTASSIUM CHLORIDE, CALCIUM CHLORIDE 600; 310; 30; 20 MG/100ML; MG/100ML; MG/100ML; MG/100ML
75 INJECTION, SOLUTION INTRAVENOUS CONTINUOUS
Status: DISCONTINUED | OUTPATIENT
Start: 2020-12-29 | End: 2020-12-29 | Stop reason: HOSPADM

## 2020-12-29 RX ORDER — LIDOCAINE HYDROCHLORIDE 10 MG/ML
0.1 INJECTION, SOLUTION EPIDURAL; INFILTRATION; INTRACAUDAL; PERINEURAL AS NEEDED
Status: DISCONTINUED | OUTPATIENT
Start: 2020-12-29 | End: 2020-12-29 | Stop reason: HOSPADM

## 2020-12-29 RX ORDER — FENTANYL CITRATE 50 UG/ML
25 INJECTION, SOLUTION INTRAMUSCULAR; INTRAVENOUS AS NEEDED
Status: DISCONTINUED | OUTPATIENT
Start: 2020-12-29 | End: 2020-12-29 | Stop reason: HOSPADM

## 2020-12-29 RX ORDER — BUPIVACAINE HYDROCHLORIDE 2.5 MG/ML
INJECTION, SOLUTION EPIDURAL; INFILTRATION; INTRACAUDAL AS NEEDED
Status: DISCONTINUED | OUTPATIENT
Start: 2020-12-29 | End: 2020-12-29 | Stop reason: HOSPADM

## 2020-12-29 RX ORDER — ONDANSETRON 2 MG/ML
INJECTION INTRAMUSCULAR; INTRAVENOUS AS NEEDED
Status: DISCONTINUED | OUTPATIENT
Start: 2020-12-29 | End: 2020-12-29 | Stop reason: HOSPADM

## 2020-12-29 RX ORDER — ONDANSETRON 2 MG/ML
4 INJECTION INTRAMUSCULAR; INTRAVENOUS ONCE
Status: DISCONTINUED | OUTPATIENT
Start: 2020-12-29 | End: 2020-12-29 | Stop reason: HOSPADM

## 2020-12-29 RX ORDER — DEXMEDETOMIDINE HYDROCHLORIDE 4 UG/ML
INJECTION, SOLUTION INTRAVENOUS AS NEEDED
Status: DISCONTINUED | OUTPATIENT
Start: 2020-12-29 | End: 2020-12-29 | Stop reason: HOSPADM

## 2020-12-29 RX ORDER — FENTANYL CITRATE 50 UG/ML
INJECTION, SOLUTION INTRAMUSCULAR; INTRAVENOUS AS NEEDED
Status: DISCONTINUED | OUTPATIENT
Start: 2020-12-29 | End: 2020-12-29 | Stop reason: HOSPADM

## 2020-12-29 RX ORDER — DEXAMETHASONE SODIUM PHOSPHATE 4 MG/ML
INJECTION, SOLUTION INTRA-ARTICULAR; INTRALESIONAL; INTRAMUSCULAR; INTRAVENOUS; SOFT TISSUE AS NEEDED
Status: DISCONTINUED | OUTPATIENT
Start: 2020-12-29 | End: 2020-12-29 | Stop reason: HOSPADM

## 2020-12-29 RX ORDER — LIDOCAINE HYDROCHLORIDE 20 MG/ML
INJECTION, SOLUTION EPIDURAL; INFILTRATION; INTRACAUDAL; PERINEURAL AS NEEDED
Status: DISCONTINUED | OUTPATIENT
Start: 2020-12-29 | End: 2020-12-29 | Stop reason: HOSPADM

## 2020-12-29 RX ORDER — CEFAZOLIN SODIUM 2 G/50ML
2 SOLUTION INTRAVENOUS ONCE
Status: COMPLETED | OUTPATIENT
Start: 2020-12-29 | End: 2020-12-29

## 2020-12-29 RX ORDER — FAMOTIDINE 20 MG/1
20 TABLET, FILM COATED ORAL ONCE
Status: COMPLETED | OUTPATIENT
Start: 2020-12-29 | End: 2020-12-29

## 2020-12-29 RX ORDER — PROPOFOL 10 MG/ML
INJECTION, EMULSION INTRAVENOUS AS NEEDED
Status: DISCONTINUED | OUTPATIENT
Start: 2020-12-29 | End: 2020-12-29 | Stop reason: HOSPADM

## 2020-12-29 RX ORDER — BACITRACIN ZINC 500 UNIT/G
OINTMENT (GRAM) TOPICAL AS NEEDED
Status: DISCONTINUED | OUTPATIENT
Start: 2020-12-29 | End: 2020-12-29 | Stop reason: HOSPADM

## 2020-12-29 RX ORDER — FENTANYL CITRATE 50 UG/ML
50 INJECTION, SOLUTION INTRAMUSCULAR; INTRAVENOUS
Status: DISCONTINUED | OUTPATIENT
Start: 2020-12-29 | End: 2020-12-29 | Stop reason: HOSPADM

## 2020-12-29 RX ORDER — PROPOFOL 10 MG/ML
VIAL (ML) INTRAVENOUS
Status: DISCONTINUED | OUTPATIENT
Start: 2020-12-29 | End: 2020-12-29 | Stop reason: HOSPADM

## 2020-12-29 RX ORDER — KETOROLAC TROMETHAMINE 15 MG/ML
INJECTION, SOLUTION INTRAMUSCULAR; INTRAVENOUS AS NEEDED
Status: DISCONTINUED | OUTPATIENT
Start: 2020-12-29 | End: 2020-12-29 | Stop reason: HOSPADM

## 2020-12-29 RX ORDER — HYDRALAZINE HYDROCHLORIDE 20 MG/ML
INJECTION INTRAMUSCULAR; INTRAVENOUS AS NEEDED
Status: DISCONTINUED | OUTPATIENT
Start: 2020-12-29 | End: 2020-12-29 | Stop reason: HOSPADM

## 2020-12-29 RX ADMIN — FENTANYL CITRATE 25 MCG: 50 INJECTION, SOLUTION INTRAMUSCULAR; INTRAVENOUS at 07:39

## 2020-12-29 RX ADMIN — DEXMEDETOMIDINE HYDROCHLORIDE 10 MCG: 100 INJECTION, SOLUTION, CONCENTRATE INTRAVENOUS at 07:38

## 2020-12-29 RX ADMIN — DEXMEDETOMIDINE HYDROCHLORIDE 8 MCG: 100 INJECTION, SOLUTION, CONCENTRATE INTRAVENOUS at 07:31

## 2020-12-29 RX ADMIN — PROPOFOL 75 MCG/KG/MIN: 10 INJECTION, EMULSION INTRAVENOUS at 07:31

## 2020-12-29 RX ADMIN — SODIUM CHLORIDE, SODIUM LACTATE, POTASSIUM CHLORIDE, AND CALCIUM CHLORIDE 75 ML/HR: 600; 310; 30; 20 INJECTION, SOLUTION INTRAVENOUS at 06:35

## 2020-12-29 RX ADMIN — DEXAMETHASONE SODIUM PHOSPHATE 4 MG: 4 INJECTION, SOLUTION INTRAMUSCULAR; INTRAVENOUS at 07:34

## 2020-12-29 RX ADMIN — FAMOTIDINE 20 MG: 20 TABLET, FILM COATED ORAL at 06:34

## 2020-12-29 RX ADMIN — ONDANSETRON 4 MG: 2 INJECTION INTRAMUSCULAR; INTRAVENOUS at 07:34

## 2020-12-29 RX ADMIN — CEFAZOLIN SODIUM 2 G: 2 SOLUTION INTRAVENOUS at 07:25

## 2020-12-29 RX ADMIN — DEXMEDETOMIDINE HYDROCHLORIDE 10 MCG: 100 INJECTION, SOLUTION, CONCENTRATE INTRAVENOUS at 07:37

## 2020-12-29 RX ADMIN — DEXMEDETOMIDINE HYDROCHLORIDE 8 MCG: 100 INJECTION, SOLUTION, CONCENTRATE INTRAVENOUS at 07:28

## 2020-12-29 RX ADMIN — DEXMEDETOMIDINE HYDROCHLORIDE 8 MCG: 100 INJECTION, SOLUTION, CONCENTRATE INTRAVENOUS at 07:29

## 2020-12-29 RX ADMIN — LIDOCAINE HYDROCHLORIDE 100 MG: 20 INJECTION, SOLUTION EPIDURAL; INFILTRATION; INTRACAUDAL; PERINEURAL at 07:28

## 2020-12-29 RX ADMIN — DEXMEDETOMIDINE HYDROCHLORIDE 8 MCG: 100 INJECTION, SOLUTION, CONCENTRATE INTRAVENOUS at 07:32

## 2020-12-29 RX ADMIN — PROPOFOL 20 MG: 10 INJECTION, EMULSION INTRAVENOUS at 07:38

## 2020-12-29 RX ADMIN — KETOROLAC TROMETHAMINE 15 MG: 15 INJECTION, SOLUTION INTRAMUSCULAR; INTRAVENOUS at 07:34

## 2020-12-29 RX ADMIN — HYDRALAZINE HYDROCHLORIDE 10 MG: 20 INJECTION INTRAMUSCULAR; INTRAVENOUS at 07:42

## 2020-12-29 RX ADMIN — DEXMEDETOMIDINE HYDROCHLORIDE 8 MCG: 100 INJECTION, SOLUTION, CONCENTRATE INTRAVENOUS at 07:30

## 2020-12-29 NOTE — BRIEF OP NOTE
Brief Postoperative Note    Patient: Yvonne Jack  YOB: 1950  MRN: [id number]    Date of Procedure: 12/29/2020     Pre-Op Diagnosis: Right carpal tunnel syndrome [G56.01]    Post-Op Diagnosis: Same as preoperative diagnosis. Procedure(s):  RIGHT CARPAL TUNNEL RELEASE    Surgeon(s):   Tracey Allen DO    Surgical Assistant: Surg Asst-1: Sofia Alcazar    Anesthesia: MAC     Estimated Blood Loss (mL): Minimal    Complications: None    Specimens: * No specimens in log *     Implants: * No implants in log *    Drains: * No LDAs found *    Findings: flattened median nerve    Electronically Signed by Kylah Garza DO on 12/29/2020 at 8:00 AM

## 2020-12-29 NOTE — DISCHARGE INSTRUCTIONS
Ice and elevate right hand. Keep dressing clean and dry. Cover dressing with plastic bag when showering. Move fingers into a fist and fully straighten them every hour while awake. Patient has pain medication at home. DISCHARGE SUMMARY from Nurse  PATIENT INSTRUCTIONS:  After general anesthesia or intravenous sedation, for 24 hours or while taking prescription Narcotics:  · Limit your activities  · Do not drive and operate hazardous machinery  · Do not make important personal or business decisions  · Do  not drink alcoholic beverages  · If you have not urinated within 8 hours after discharge, please contact your surgeon on call. Report the following to your surgeon:  · Excessive pain, swelling, redness or odor of or around the surgical area  · Temperature over 100.5  · Nausea and vomiting lasting longer than 4 hours or if unable to take medications  · Any signs of decreased circulation or nerve impairment to extremity: change in color, persistent  numbness, tingling, coldness or increase pain  · Any questions    What to do at Home:  Recommended activity: Activity as tolerated and no driving for today. *  Please give a list of your current medications to your Primary Care Provider. *  Please update this list whenever your medications are discontinued, doses are      changed, or new medications (including over-the-counter products) are added. *  Please carry medication information at all times in case of emergency situations. These are general instructions for a healthy lifestyle:    No smoking/ No tobacco products/ Avoid exposure to second hand smoke  Surgeon General's Warning:  Quitting smoking now greatly reduces serious risk to your health.     Obesity, smoking, and sedentary lifestyle greatly increases your risk for illness    A healthy diet, regular physical exercise & weight monitoring are important for maintaining a healthy lifestyle    You may be retaining fluid if you have a history of heart failure or if you experience any of the following symptoms:  Weight gain of 3 pounds or more overnight or 5 pounds in a week, increased swelling in our hands or feet or shortness of breath while lying flat in bed. Please call your doctor as soon as you notice any of these symptoms; do not wait until your next office visit. The discharge information has been reviewed with the patient. The patient verbalized understanding. Discharge medications reviewed with the patient and appropriate educational materials and side effects teaching were provided. ___________________________________________________________________________________________________________________________________    Patient Education   Carpal Tunnel Release: What to Expect at [facility] tunnel reduces the pressure on a nerve in the wrist. Your doctor cut a ligament that presses on the nerve. This lets the nerve pass freely through the tunnel without being squeezed. Your hand will hurt and may feel weak with some numbness. This usually goes away in a few days, but it may take several months. Your doctor may remove the large bandage, or he or she will tell you when and how to remove it yourself. In some cases, you may have a splint. If you have one, you will wear it for about 2 weeks. Your doctor will take out your stitches in 1 to 2 weeks. Your hand and wrist may feel worse than they used to feel. But the pain should start to go away. It usually takes 3 to 4 months to recover and up to 1 year before hand strength returns. How much strength returns will vary. The timing of your return to work depends on the type of surgery you had, whether the surgery was on your dominant hand (the hand you use most), and your work activities. If you had open surgery on your dominant hand and you do repeated actions at work, you may be able to go back to work in 10 to 8 weeks. Repeated motions include typing or assembly-line work.  If the surgery was on the other hand and you don't do repeated actions at work, you may be able to return to work in 9 to 14 days. If you had endoscopic surgery, you may be able to go back to work sooner than with open surgery. This care sheet gives you a general idea about how long it will take for you to recover. But each person recovers at a different pace. Follow the steps below to get better as quickly as possible. How can you care for yourself at home? Activity    · Rest when you feel tired. Getting enough sleep will help you recover.     · Try to walk each day. Start by walking a little more than you did the day before. Bit by bit, increase the amount you walk.     · For up to 2 weeks after surgery, avoid lifting things heavier than 1 to 2 pounds and using your hand. This includes doing repeated arm or hand movements, such as typing or using a computer mouse, washing windows, vacuuming, or chopping food. Do not use power tools, and avoid activities that cause vibration.     · You may do heavier tasks about 4 weeks after surgery. These include vacuuming, mowing the lawn, and gardening.     · You may shower 24 to 48 hours after surgery, if your doctor okays it. Keep your bandage dry by taping a sheet of plastic to cover it. If you have a splint, keep it dry. Your doctor will tell you if you can remove it when you shower. Be careful not to put the splint on too tight. Do not take a bath until the incision heals, or until your doctor tells you it is okay.     · You may drive when you are fully able to use your hand. Diet    · You can eat your normal diet. If your stomach is upset, try bland, low-fat foods like plain rice, broiled chicken, toast, and yogurt. Medicines    · Your doctor will tell you if and when you can restart your medicines.  He or she will also give you instructions about taking any new medicines.     · If you take aspirin or some other blood thinner, ask your doctor if and when to start taking it again. Make sure that you understand exactly what your doctor wants you to do.     · Take pain medicines exactly as directed. ? If the doctor gave you a prescription medicine for pain, take it as prescribed. ? If you are not taking a prescription pain medicine, take an over-the-counter medicine such as acetaminophen (Tylenol), ibuprofen (Advil, Motrin), or naproxen (Aleve). Read and follow all instructions on the label. ? Do not take two or more pain medicines at the same time unless the doctor told you to. Many pain medicines have acetaminophen, which is Tylenol. Too much acetaminophen (Tylenol) can be harmful.     · If you think your pain medicine is making you sick to your stomach:  ? Take your medicine after meals (unless your doctor has told you not to). ? Ask your doctor for a different pain medicine.     · If your doctor prescribed antibiotics, take them as directed. Do not stop taking them just because you feel better. You need to take the full course of antibiotics. Incision and splint care    · Keep your bandage dry. If it gets dirty, you may change it.     · If you have a splint, talk to your doctor about when you should wear it. Exercise    · You may need wrist and hand rehabilitation. This is a series of exercises you do after your surgery. This helps you get back your wrist's and hand's range of motion, strength, and . You will work with your doctor and physical or occupational therapist to plan this exercise program. To get the best results, you need to do the exercises correctly and as often and as long as your doctor tells you. Ice and elevation    · Put ice or a cold pack on your wrist for 10 to 20 minutes at a time. Try to do this every 1 to 2 hours for the next 3 days (when you are awake) or until the swelling goes down. Put a thin cloth between the ice and your skin.     · Prop up the sore wrist on a pillow when you ice it or anytime you sit or lie down during the next 3 days. Try to keep it above the level of your heart. This will help reduce swelling. Other instructions    · Avoid letting your hand hang down. This can cause swelling. Follow-up care is a key part of your treatment and safety. Be sure to make and go to all appointments, and call your doctor if you are having problems. It's also a good idea to know your test results and keep a list of the medicines you take. When should you call for help? Call 911 anytime you think you may need emergency care. For example, call if:    · You passed out (lost consciousness).     · You have chest pain, are short of breath, or cough up blood. Call your doctor now or seek immediate medical care if:    · You have pain that does not get better after you take pain medicine.     · Your hand is cool or pale or changes color.     · Your cast or splint feels too tight.     · You have tingling, weakness, or numbness in your hand or fingers.     · You are sick to your stomach or cannot drink fluids.     · You have loose stitches, or your incision comes open.     · You have signs of a blood clot in your leg (called a deep vein thrombosis), such as:  ? Pain in your calf, back of the knee, thigh, or groin. ? Redness or swelling in your leg.     · You have signs of infection, such as:  ? Increased pain, swelling, warmth, or redness. ? Red streaks leading from the incision. ? Pus draining from the incision. ? A fever.     · Bright red blood has soaked through the bandage over your incision. Watch closely for any changes in your health, and be sure to contact your doctor if:    · You have a problem with your cast or splint.     · You do not get better as expected. Where can you learn more? Go to http://www.gray.com/  Enter N388 in the search box to learn more about \"Carpal Tunnel Release: What to Expect at Home. \"  Current as of: March 2, 2020               Content Version: 12.6  © 2972-5693 [employer]. Care instructions adapted under license by [employer] (which disclaims liability or warranty for this information). If you have questions about a medical condition or this instruction, always ask your healthcare professional. [address] any warranty or liability for your use of this information.

## 2020-12-29 NOTE — OP NOTES
Operative Report    Patient: Michael Rothman MRN: [id number]  SSN: [id number]    YOB: 1950  Age: 65 y.o. Sex: female       Date of Surgery: 12/29/2020     Preoperative Diagnosis: Right carpal tunnel syndrome [G56.01]     Postoperative Diagnosis: Right carpal tunnel syndrome [G56.01]     Surgeon(s) and Role:     Leslie Sebastian, DO - Primary    Assistant Luis Metz    Anesthesia: MAC and local    Procedure: Procedure(s):  RIGHT CARPAL TUNNEL RELEASE [id number]    Findings: Flattened median nerve    Procedure in Detail:     Indications for procedure of and outlined in the perioperative documentation. Most notably being refractory to conservative treatment. Informed consent was obtained from the patient. The risks and benefits of the procedure were discussed with the patient. They include but not limited to neurovascular injury, tendon injury, blood loss, infection, incomplete release of nerve, incomplete relief of symptoms, chronic pain, chronic stiffness, need for further surgery, recurrence of symptoms, complications from anesthesia including death, and the possibility of violetta Covid. After informed consent was obtained from the patient, she was taken back to the operative suite. A tourniquet was applied to the right upper extremity and 10 mL of quarter percent Marcaine plain was injected into the carpal tunnel and the surrounding subcutaneous tissues. The arm was prepped and draped in the normal sterile fashion. The arm was exsanguinated and the tourniquet was elevated to 250 mmHg. An incision was made over the carpal tunnel in line with the radial border of the fourth ray. The subcutaneous tissues were dissected and electrocautery was used for hemostasis. Electrocautery was used to obtain hemostasis of any bleeders. The palmar fascia was incised in line with the incision.   A self-retaining retractor was placed and the transverse carpal ligament was incised centrally after elevating any ulnar's brevis muscle off of the ligament. The tenosynovium was identified and attention was then turned distally. The transverse carpal ligament was incised up to the level of the fat pad where the motor branch was visualized. Attention was then turned proximally where the remainder of the palmar fascia was incised. Geradine Donate was used to elevate the transverse carpal ligament away from the tenosynovium. The carpal tunnel knife was then used to incise the remainder of the transverse carpal ligament proximally. The carpal tunnel was found to be completely released. The wound was copiously irrigated. The wound was closed with 4-0 Vicryl Rapide in interrupted fashion. The patient was placed into a sterile dressing and sent to recovery in stable condition. She was given appropriate wound care instructions, follow-up with me in the outpatient setting, and instructions to use her already pre-existing pain medicine. Estimated Blood Loss: Minimal    Tourniquet Time:   Total Tourniquet Time Documented:  Upper Arm (Right) - 17 minutes  Total: Upper Arm (Right) - 17 minutes        Implants: * No implants in log *            Specimens: * No specimens in log *        Drains: None                Complications: None    Counts: Sponge and needle counts were correct times two.     Signed By:  Rola Garcia DO     December 29, 2020

## 2020-12-29 NOTE — ANESTHESIA PREPROCEDURE EVALUATION
Relevant Problems   No relevant active problems       Anesthetic History   No history of anesthetic complications            Review of Systems / Medical History  Patient summary reviewed, nursing notes reviewed and pertinent labs reviewed    Pulmonary        Sleep apnea: No treatment           Neuro/Psych         Psychiatric history    Comments: Chronic back pain  Depression  This morning very somnolent, hard to stay awake. Pupils are not constricted, when aroused she is appropriate. Cardiovascular    Hypertension: poorly controlled              Exercise tolerance: <4 METS  Comments: Cant exercise due to back pain issues.    GI/Hepatic/Renal           PUD     Endo/Other        Arthritis     Other Findings              Physical Exam    Airway  Mallampati: III  TM Distance: 4 - 6 cm  Neck ROM: normal range of motion   Mouth opening: Normal     Cardiovascular    Rhythm: regular  Rate: normal         Dental  No notable dental hx       Pulmonary  Breath sounds clear to auscultation               Abdominal  GI exam deferred       Other Findings            Anesthetic Plan    ASA: 3  Anesthesia type: MAC          Induction: Intravenous  Anesthetic plan and risks discussed with: Patient

## 2020-12-29 NOTE — H&P
Update History & Physical    The Patient's History and Physical of December 23, 2020 was reviewed with the patient and I examined the patient. There was no change. The surgical site was confirmed by the patient and me. Plan:  The risk, benefits, expected outcome, and alternative to the recommended procedure have been discussed with the patient. Patient understands and wants to proceed with the procedure.     Electronically signed by Erik Gandhi DO on 12/29/2020 at 7:27 AM

## 2021-01-08 ENCOUNTER — OFFICE VISIT (OUTPATIENT)
Dept: ORTHOPEDIC SURGERY | Age: 71
End: 2021-01-08
Payer: MEDICARE

## 2021-01-08 VITALS
WEIGHT: 157 LBS | HEIGHT: 66 IN | OXYGEN SATURATION: 96 % | DIASTOLIC BLOOD PRESSURE: 73 MMHG | HEART RATE: 71 BPM | TEMPERATURE: 97.3 F | BODY MASS INDEX: 25.23 KG/M2 | SYSTOLIC BLOOD PRESSURE: 149 MMHG

## 2021-01-08 DIAGNOSIS — G56.01 RIGHT CARPAL TUNNEL SYNDROME: Primary | ICD-10-CM

## 2021-01-08 DIAGNOSIS — G56.02 LEFT CARPAL TUNNEL SYNDROME: ICD-10-CM

## 2021-01-08 DIAGNOSIS — Z98.890 S/P CARPAL TUNNEL RELEASE: ICD-10-CM

## 2021-01-08 PROCEDURE — 99024 POSTOP FOLLOW-UP VISIT: CPT | Performed by: ORTHOPAEDIC SURGERY

## 2021-01-08 NOTE — PROGRESS NOTES
Mariam Ricci is a 65 y.o. female right handed individual, not currently working. Worker's Compensation and legal considerations: not known and none filed. Vitals:    01/08/21 1001   BP: (!) 149/73   Pulse: 71   Temp: 97.3 °F (36.3 °C)   TempSrc: Skin   SpO2: 96%   Weight: 157 lb (71.2 kg)   Height: 5' 6\" (1.676 m)   PainSc:   7   PainLoc: Hand           Chief Complaint   Patient presents with    Hand Pain     right       HPI: Patient comes in today for first postoperative appointment 2 weeks status post right carpal tunnel release. She reports to be doing well with minimal pain. She reports her numbness is much improved. Preop HPI: Patient comes in today for preoperative history and physical for right carpal tunnel release. She denies any change from previous visit. EMG HPI:  Patient comes in today for EMG at her last visit she was given carpal tunnel braces to wear at night. Initial  HPI: Patient comes in today with complaints of bilateral hand numbness and tingling. The right side is been going on for some time and significantly worse. She says the left side is slowly bothering her.     Date of onset:  Indeterminate    Injury: No    Prior Treatment:  Yes: Comment: right carpal tunnel release    Numbness/ Tingling: Yes: Comment: Bilateral hands thumb index and middle right worse than left    ROS: Review of Systems - General ROS: negative  Respiratory ROS: no cough, shortness of breath, or wheezing  Cardiovascular ROS: no chest pain or dyspnea on exertion  Musculoskeletal ROS: positive for - pain in hand - bilateral  Neurological ROS: positive for - numbness/tingling  Dermatological ROS: negative    Past Medical History:   Diagnosis Date    Abdominal pain     Benign essential hypertension     Cancer (HCC)     uterine    Depressive disorder     Fatigue     Hypertension     Insomnia     NATHANAEL (obstructive sleep apnea)     does not use cpap machine    Osteoarthritis     Peptic ulcer 2012  Spinal stenosis     with chronic pain       Past Surgical History:   Procedure Laterality Date    HX APPENDECTOMY  1962    HX BACK SURGERY  1996    PHLD; L4-L5    HX GASTRIC BYPASS  2009    HX HYSTERECTOMY  1983    uterine cancer, stage 1    HX KNEE REPLACEMENT  2005    right    HX VISHAL AND BSO         Current Outpatient Medications   Medication Sig Dispense Refill    oxybutynin (DITROPAN) 5 mg tablet Take 5 mg by mouth three (3) times daily.  multivitamin (ONE A DAY) tablet Take 1 Tab by mouth daily.  oxyCODONE-acetaminophen (PERCOCET 10)  mg per tablet Take  by mouth.  gabapentin (NEURONTIN) 300 mg capsule Take 1 tab po QAM and 2 tabs po QPM 90 Cap 2    cloNIDine HCl (CATAPRES) 0.1 mg tablet Take 1 Tab by mouth every twelve (12) hours as needed (restlessness, hot flashes, sweats - PRN opiate withdrawl) for up to 8 doses. 8 Tab 0    naloxone (NARCAN) 4 mg/actuation nasal spray Use 1 spray intranasally, then discard. Repeat with new spray every 2 min as needed for opioid overdose symptoms, alternating nostrils. Indications: Decrease in Rate & Depth of Breathing due to Opioid Drug, opioid overdose 1 Each 1    carvedilol (COREG) 12.5 mg tablet TAKE 1 TABLET BY MOUTH TWICE DAILY WITH MEALS 180 Tab 0    docusate sodium (COLACE) 100 mg capsule Take 1 Cap by mouth two (2) times daily as needed for Constipation. 60 Cap 2    amLODIPine (NORVASC) 5 mg tablet TAKE 1 TABLET BY MOUTH DAILY 90 Tab 0    omeprazole (PRILOSEC OTC) 20 mg tablet Take 20 mg by mouth daily.  dicyclomine (BENTYL) 10 mg capsule Take 10 mg by mouth 4 times daily (before meals and nightly).  ARIPiprazole (ABILIFY) 10 mg tablet Take 1/2 to 1 tablet by mouth daily as directed      l-methylfolate (DEPLIN) 15 mg tablet Take 15 mg by mouth daily.  colestipol (COLESTID) 1 gram tablet Take 1 g by mouth two (2) times a day.  sertraline (ZOLOFT) 100 mg tablet Take 200 mg by mouth daily.          Allergies Allergen Reactions    Morphine Unknown (comments)     Hallucinations/makes her mean             PE:     Physical Exam  Vitals signs and nursing note reviewed. Constitutional:       General: She is not in acute distress. Appearance: Normal appearance. She is not ill-appearing. Eyes:      Extraocular Movements: Extraocular movements intact. Pupils: Pupils are equal, round, and reactive to light. Neck:      Musculoskeletal: Normal range of motion and neck supple. Cardiovascular:      Pulses: Normal pulses. Pulmonary:      Effort: Pulmonary effort is normal. No respiratory distress. Abdominal:      General: Abdomen is flat. There is no distension. Musculoskeletal: Normal range of motion. General: Tenderness present. No swelling, deformity or signs of injury. Right lower leg: No edema. Left lower leg: No edema. Skin:     General: Skin is warm and dry. Capillary Refill: Capillary refill takes less than 2 seconds. Findings: No bruising or erythema. Neurological:      General: No focal deficit present. Mental Status: She is alert and oriented to person, place, and time. Cranial Nerves: No cranial nerve deficit. Sensory: No sensory deficit. Psychiatric:         Mood and Affect: Mood normal.         Behavior: Behavior normal.         NEUROVASCULAR    Examination L R Examination L R   Carpal Comp. +  Pronator Comp. - -   Carpal Tinel +  Pronator Tinel - -   Phalen's +  Pronator Stress - -   Cubital Comp. - - Guyon Comp. - -   Cubital Tinel - - Guyon Tinel - -   Elbow Hyperflexion - - Adson's - -   Spurling's - - SC Comp. - -   PCB Median abn - - SC Tinel - -   Radial Tinel - - IC Comp.  - -   Digital Tinel - - IC Tinel - -   Radial 2-Pt WNL WNL Ulnar 2-Pt WNL WNL     Radial Pulse: 2+  Capillary Refill: < 2 sec  Nick: Not Performed  [employer]: Not Performed      Imaging: None indicated      NCV & EMG Findings:  Evaluation of the left median motor nerve showed prolonged distal onset latency (4.3 ms), reduced amplitude (2.8 mV), and decreased conduction velocity (Elbow-Wrist, 45 m/s).  The right median motor nerve showed reduced amplitude (2.8 mV).  The left median sensory nerve showed no response (Wrist) and no response (Elbow).  The right median sensory nerve showed no response (Wrist).  All remaining nerves (as indicated in the following tables) were within normal limits.  All left vs. right side differences were within normal limits.       Needle evaluation of the right abductor pollicis brevis muscle showed increased insertional activity, moderately increased spontaneous activity, and increased motor unit amplitude.  All remaining muscles (as indicated in the following table) showed no evidence of electrical instability.       INTERPRETATION  This is an abnormal electrodiagnostic examination. These findings may be consistent with:  1. Moderate median mononeuropathy at the left wrist (carpal tunnel syndrome)  2. Chronic mild median mononeuropathy at the right wrist (carpal tunnel syndrome) - previously treated     There is no electrodiagnostic evidence of any cervical radiculopathy, brachial plexopathy, peripheral polyneuropathy, or any other mononeuropathy.     CLINICAL INTERPRETATION  Her electrodiagnostic findings are consistent with symptoms in her hands.       ICD-10-CM ICD-9-CM    1. Right carpal tunnel syndrome  G56.01 354.0    2. S/P carpal tunnel release  Z98.890 V45.89    3. Left carpal tunnel syndrome  G56.02 354.0        Plan:     Discussed range of motion exercises and scar care    Follow-up and Dispositions    · Return in about 4 weeks (around 2/5/2021) for reevaluation and wound check.         Plan was reviewed with patient, who verbalized agreement and understanding of the plan

## 2021-01-22 ENCOUNTER — TRANSCRIBE ORDER (OUTPATIENT)
Dept: SCHEDULING | Age: 71
End: 2021-01-22

## 2021-01-22 DIAGNOSIS — Z12.31 VISIT FOR SCREENING MAMMOGRAM: Primary | ICD-10-CM

## 2021-02-12 ENCOUNTER — OFFICE VISIT (OUTPATIENT)
Dept: ORTHOPEDIC SURGERY | Age: 71
End: 2021-02-12
Payer: MEDICARE

## 2021-02-12 VITALS
HEIGHT: 59 IN | TEMPERATURE: 97.3 F | WEIGHT: 162 LBS | DIASTOLIC BLOOD PRESSURE: 69 MMHG | BODY MASS INDEX: 32.66 KG/M2 | HEART RATE: 62 BPM | RESPIRATION RATE: 17 BRPM | OXYGEN SATURATION: 98 % | SYSTOLIC BLOOD PRESSURE: 154 MMHG

## 2021-02-12 DIAGNOSIS — G56.02 LEFT CARPAL TUNNEL SYNDROME: ICD-10-CM

## 2021-02-12 DIAGNOSIS — G56.01 RIGHT CARPAL TUNNEL SYNDROME: Primary | ICD-10-CM

## 2021-02-12 DIAGNOSIS — Z98.890 S/P CARPAL TUNNEL RELEASE: ICD-10-CM

## 2021-02-12 PROCEDURE — 99024 POSTOP FOLLOW-UP VISIT: CPT | Performed by: ORTHOPAEDIC SURGERY

## 2021-02-12 NOTE — PROGRESS NOTES
North Smith is a 65 y.o. female right handed individual, not currently working. Worker's Compensation and legal considerations: not known and none filed. Vitals:    02/12/21 1150   BP: (!) 154/69   Pulse: 62   Resp: 17   Temp: 97.3 °F (36.3 °C)   SpO2: 98%   Weight: 162 lb (73.5 kg)   Height: 4' 11.25\" (1.505 m)   PainSc:   6           Chief Complaint   Patient presents with    Hand Pain     right       HPI: Patient presents 6 weeks status post right carpal tunnel release. She reports the pain about the incision she was having previously is improved. She does report some persistent numbness in the fingers. She reports only occasional numbness on the left side. 2wk HPI: Patient comes in today for first postoperative appointment 2 weeks status post right carpal tunnel release. She reports to be doing well with minimal pain. She reports her numbness is much improved. Preop HPI: Patient comes in today for preoperative history and physical for right carpal tunnel release. She denies any change from previous visit. EMG HPI:  Patient comes in today for EMG at her last visit she was given carpal tunnel braces to wear at night. Initial  HPI: Patient comes in today with complaints of bilateral hand numbness and tingling. The right side is been going on for some time and significantly worse. She says the left side is slowly bothering her.     Date of onset:  Indeterminate    Injury: No    Prior Treatment:  Yes: Comment: right carpal tunnel release    Numbness/ Tingling: Yes: Comment: Bilateral hands thumb index and middle right worse than left    ROS: Review of Systems - General ROS: negative  Respiratory ROS: no cough, shortness of breath, or wheezing  Cardiovascular ROS: no chest pain or dyspnea on exertion  Musculoskeletal ROS: positive for - pain in hand - bilateral  Neurological ROS: positive for - numbness/tingling  Dermatological ROS: negative    Past Medical History:   Diagnosis Date    Abdominal pain     Benign essential hypertension     Cancer (HCC)     uterine    Depressive disorder     Fatigue     Hypertension     Insomnia     NATHANAEL (obstructive sleep apnea)     does not use cpap machine    Osteoarthritis     Peptic ulcer 2012    Spinal stenosis     with chronic pain       Past Surgical History:   Procedure Laterality Date    HX APPENDECTOMY  1962    HX BACK SURGERY  1996    PHLD; L4-L5    HX GASTRIC BYPASS  2009    HX HYSTERECTOMY  1983    uterine cancer, stage 1    HX KNEE REPLACEMENT  2005    right    HX VISHAL AND BSO         Current Outpatient Medications   Medication Sig Dispense Refill    oxybutynin (DITROPAN) 5 mg tablet Take 5 mg by mouth three (3) times daily.  multivitamin (ONE A DAY) tablet Take 1 Tab by mouth daily.  oxyCODONE-acetaminophen (PERCOCET 10)  mg per tablet Take  by mouth.  gabapentin (NEURONTIN) 300 mg capsule Take 1 tab po QAM and 2 tabs po QPM 90 Cap 2    cloNIDine HCl (CATAPRES) 0.1 mg tablet Take 1 Tab by mouth every twelve (12) hours as needed (restlessness, hot flashes, sweats - PRN opiate withdrawl) for up to 8 doses. 8 Tab 0    carvedilol (COREG) 12.5 mg tablet TAKE 1 TABLET BY MOUTH TWICE DAILY WITH MEALS 180 Tab 0    docusate sodium (COLACE) 100 mg capsule Take 1 Cap by mouth two (2) times daily as needed for Constipation. 60 Cap 2    amLODIPine (NORVASC) 5 mg tablet TAKE 1 TABLET BY MOUTH DAILY 90 Tab 0    omeprazole (PRILOSEC OTC) 20 mg tablet Take 20 mg by mouth daily.  dicyclomine (BENTYL) 10 mg capsule Take 10 mg by mouth 4 times daily (before meals and nightly).  ARIPiprazole (ABILIFY) 10 mg tablet Take 1/2 to 1 tablet by mouth daily as directed      l-methylfolate (DEPLIN) 15 mg tablet Take 15 mg by mouth daily.  sertraline (ZOLOFT) 100 mg tablet Take 200 mg by mouth daily.  naloxone (NARCAN) 4 mg/actuation nasal spray Use 1 spray intranasally, then discard.  Repeat with new spray every 2 min as needed for opioid overdose symptoms, alternating nostrils. Indications: Decrease in Rate & Depth of Breathing due to Opioid Drug, opioid overdose 1 Each 1    colestipol (COLESTID) 1 gram tablet Take 1 g by mouth two (2) times a day. Allergies   Allergen Reactions    Morphine Unknown (comments)     Hallucinations/makes her mean             PE:     Physical Exam  Vitals signs and nursing note reviewed. Constitutional:       General: She is not in acute distress. Appearance: Normal appearance. She is not ill-appearing. Eyes:      Extraocular Movements: Extraocular movements intact. Pupils: Pupils are equal, round, and reactive to light. Neck:      Musculoskeletal: Normal range of motion and neck supple. Cardiovascular:      Pulses: Normal pulses. Pulmonary:      Effort: Pulmonary effort is normal. No respiratory distress. Abdominal:      General: Abdomen is flat. There is no distension. Musculoskeletal: Normal range of motion. General: Tenderness present. No swelling, deformity or signs of injury. Right lower leg: No edema. Left lower leg: No edema. Skin:     General: Skin is warm and dry. Capillary Refill: Capillary refill takes less than 2 seconds. Findings: No bruising or erythema. Neurological:      General: No focal deficit present. Mental Status: She is alert and oriented to person, place, and time. Cranial Nerves: No cranial nerve deficit. Sensory: No sensory deficit. Psychiatric:         Mood and Affect: Mood normal.         Behavior: Behavior normal.         Right hand: Incision healed. Sensation grossly intact distally with some paresthesias. Cap refill brisk range of motion full. NEUROVASCULAR    Examination L R Examination L R   Carpal Comp. +  Pronator Comp. - -   Carpal Tinel +  Pronator Tinel - -   Phalen's +  Pronator Stress - -   Cubital Comp. - - Guyon Comp.  - -   Cubital Tinel - - Guyon Tinel - -   Elbow Hyperflexion - - Adson's - -   Spurling's - - SC Comp. - -   PCB Median abn - - SC Tinel - -   Radial Tinel - - IC Comp. - -   Digital Tinel - - IC Tinel - -   Radial 2-Pt WNL WNL Ulnar 2-Pt WNL WNL     Radial Pulse: 2+  Capillary Refill: < 2 sec  Nick: Not Performed  [employer]: Not Performed      Imaging: None indicated      NCV & EMG Findings:  Evaluation of the left median motor nerve showed prolonged distal onset latency (4.3 ms), reduced amplitude (2.8 mV), and decreased conduction velocity (Elbow-Wrist, 45 m/s). The right median motor nerve showed reduced amplitude (2.8 mV). The left median sensory nerve showed no response (Wrist) and no response (Elbow). The right median sensory nerve showed no response (Wrist). All remaining nerves (as indicated in the following tables) were within normal limits. All left vs. right side differences were within normal limits.       Needle evaluation of the right abductor pollicis brevis muscle showed increased insertional activity, moderately increased spontaneous activity, and increased motor unit amplitude. All remaining muscles (as indicated in the following table) showed no evidence of electrical instability.       INTERPRETATION  This is an abnormal electrodiagnostic examination. These findings may be consistent with:  1. Moderate median mononeuropathy at the left wrist (carpal tunnel syndrome)  2. Chronic mild median mononeuropathy at the right wrist (carpal tunnel syndrome) - previously treated     There is no electrodiagnostic evidence of any cervical radiculopathy, brachial plexopathy, peripheral polyneuropathy, or any other mononeuropathy.     CLINICAL INTERPRETATION  Her electrodiagnostic findings are consistent with symptoms in her hands. ICD-10-CM ICD-9-CM    1. Right carpal tunnel syndrome  G56.01 354.0    2. S/P carpal tunnel release  Z98.890 V45.89    3.  Left carpal tunnel syndrome  G56.02 354.0        Plan:     Continue range of motion exercises and scar care. Follow-up and Dispositions    · Return if symptoms worsen or fail to improve.          Plan was reviewed with patient, who verbalized agreement and understanding of the plan

## 2021-06-03 ENCOUNTER — HOSPITAL ENCOUNTER (OUTPATIENT)
Dept: MAMMOGRAPHY | Age: 71
Discharge: HOME | End: 2021-06-03
Attending: STUDENT IN AN ORGANIZED HEALTH CARE EDUCATION/TRAINING PROGRAM
Payer: MEDICARE

## 2021-06-03 DIAGNOSIS — Z12.31 VISIT FOR SCREENING MAMMOGRAM: ICD-10-CM

## 2021-06-03 PROCEDURE — 77067 SCR MAMMO BI INCL CAD: CPT

## 2022-03-19 PROBLEM — M81.0 AGE-RELATED OSTEOPOROSIS WITHOUT CURRENT PATHOLOGICAL FRACTURE: Status: ACTIVE | Noted: 2017-07-10

## 2022-03-19 PROBLEM — F33.9 RECURRENT DEPRESSION (HCC): Status: ACTIVE | Noted: 2018-01-03

## 2022-03-20 PROBLEM — M81.0 OSTEOPOROSIS WITHOUT CURRENT PATHOLOGICAL FRACTURE: Status: ACTIVE | Noted: 2017-07-10

## 2024-04-11 RX ORDER — AMLODIPINE BESYLATE 10 MG/1
10 TABLET ORAL DAILY
COMMUNITY

## 2024-04-11 RX ORDER — GABAPENTIN 600 MG/1
600 TABLET ORAL 2 TIMES DAILY
COMMUNITY
Start: 2020-11-09

## 2024-04-11 RX ORDER — ROSUVASTATIN CALCIUM 10 MG/1
10 TABLET, COATED ORAL
COMMUNITY
Start: 2023-12-19

## 2024-04-11 RX ORDER — OMEPRAZOLE 40 MG/1
40 CAPSULE, DELAYED RELEASE ORAL DAILY
COMMUNITY

## 2024-04-11 NOTE — PROGRESS NOTES
Pressure/Heart medications.      Any questions regarding prep, please call the office at [phone].    For any questions or concerns on the day of procedure, please call the End[facility] at [phone].    These surgical instructions were reviewed with Shi Street during the PAT phone call.

## 2024-04-20 ENCOUNTER — ANESTHESIA EVENT (OUTPATIENT)
Facility: HOSPITAL | Age: 74
End: 2024-04-20
Payer: MEDICARE

## 2024-04-22 ENCOUNTER — ANESTHESIA (OUTPATIENT)
Facility: HOSPITAL | Age: 74
End: 2024-04-22
Payer: MEDICARE

## 2024-04-22 ENCOUNTER — HOSPITAL ENCOUNTER (OUTPATIENT)
Facility: HOSPITAL | Age: 74
Setting detail: OUTPATIENT SURGERY
Discharge: HOME | End: 2024-04-22
Attending: INTERNAL MEDICINE | Admitting: INTERNAL MEDICINE
Payer: MEDICARE

## 2024-04-22 VITALS
BODY MASS INDEX: 34.22 KG/M2 | SYSTOLIC BLOOD PRESSURE: 135 MMHG | OXYGEN SATURATION: 95 % | HEART RATE: 69 BPM | HEIGHT: 57 IN | TEMPERATURE: 97.1 F | DIASTOLIC BLOOD PRESSURE: 60 MMHG | WEIGHT: 158.6 LBS | RESPIRATION RATE: 13 BRPM

## 2024-04-22 PROCEDURE — 2500000003 HC RX 250 WO HCPCS: Performed by: ANESTHESIOLOGY

## 2024-04-22 PROCEDURE — 7100000000 HC PACU RECOVERY - FIRST 15 MIN: Performed by: INTERNAL MEDICINE

## 2024-04-22 PROCEDURE — 2580000003 HC RX 258: Performed by: NURSE ANESTHETIST, CERTIFIED REGISTERED

## 2024-04-22 PROCEDURE — 2709999900 HC NON-CHARGEABLE SUPPLY: Performed by: INTERNAL MEDICINE

## 2024-04-22 PROCEDURE — 3700000001 HC ADD 15 MINUTES (ANESTHESIA): Performed by: INTERNAL MEDICINE

## 2024-04-22 PROCEDURE — 3700000000 HC ANESTHESIA ATTENDED CARE: Performed by: INTERNAL MEDICINE

## 2024-04-22 PROCEDURE — 88305 TISSUE EXAM BY PATHOLOGIST: CPT

## 2024-04-22 PROCEDURE — 7100000010 HC PHASE II RECOVERY - FIRST 15 MIN: Performed by: INTERNAL MEDICINE

## 2024-04-22 PROCEDURE — 3600007502: Performed by: INTERNAL MEDICINE

## 2024-04-22 PROCEDURE — 6360000002 HC RX W HCPCS: Performed by: ANESTHESIOLOGY

## 2024-04-22 PROCEDURE — 3600007512: Performed by: INTERNAL MEDICINE

## 2024-04-22 RX ORDER — PROPOFOL 10 MG/ML
INJECTION, EMULSION INTRAVENOUS PRN
Status: DISCONTINUED | OUTPATIENT
Start: 2024-04-22 | End: 2024-04-22 | Stop reason: SDUPTHER

## 2024-04-22 RX ORDER — SODIUM CHLORIDE 9 MG/ML
INJECTION, SOLUTION INTRAVENOUS PRN
Status: DISCONTINUED | OUTPATIENT
Start: 2024-04-22 | End: 2024-04-22 | Stop reason: HOSPADM

## 2024-04-22 RX ORDER — LIDOCAINE HYDROCHLORIDE 20 MG/ML
INJECTION, SOLUTION EPIDURAL; INFILTRATION; INTRACAUDAL; PERINEURAL PRN
Status: DISCONTINUED | OUTPATIENT
Start: 2024-04-22 | End: 2024-04-22 | Stop reason: SDUPTHER

## 2024-04-22 RX ORDER — SODIUM CHLORIDE, SODIUM LACTATE, POTASSIUM CHLORIDE, CALCIUM CHLORIDE 600; 310; 30; 20 MG/100ML; MG/100ML; MG/100ML; MG/100ML
INJECTION, SOLUTION INTRAVENOUS CONTINUOUS
Status: DISCONTINUED | OUTPATIENT
Start: 2024-04-22 | End: 2024-04-22 | Stop reason: HOSPADM

## 2024-04-22 RX ORDER — SODIUM CHLORIDE 0.9 % (FLUSH) 0.9 %
5-40 SYRINGE (ML) INJECTION PRN
Status: DISCONTINUED | OUTPATIENT
Start: 2024-04-22 | End: 2024-04-22 | Stop reason: HOSPADM

## 2024-04-22 RX ORDER — SODIUM CHLORIDE 0.9 % (FLUSH) 0.9 %
5-40 SYRINGE (ML) INJECTION EVERY 12 HOURS SCHEDULED
Status: DISCONTINUED | OUTPATIENT
Start: 2024-04-22 | End: 2024-04-22 | Stop reason: HOSPADM

## 2024-04-22 RX ADMIN — PROPOFOL 75 MG: 10 INJECTION, EMULSION INTRAVENOUS at 09:12

## 2024-04-22 RX ADMIN — LIDOCAINE HYDROCHLORIDE 60 MG: 20 INJECTION, SOLUTION EPIDURAL; INFILTRATION; INTRACAUDAL; PERINEURAL at 09:12

## 2024-04-22 RX ADMIN — PROPOFOL 50 MG: 10 INJECTION, EMULSION INTRAVENOUS at 09:23

## 2024-04-22 RX ADMIN — PROPOFOL 25 MG: 10 INJECTION, EMULSION INTRAVENOUS at 09:29

## 2024-04-22 RX ADMIN — SODIUM CHLORIDE, POTASSIUM CHLORIDE, SODIUM LACTATE AND CALCIUM CHLORIDE: 600; 310; 30; 20 INJECTION, SOLUTION INTRAVENOUS at 08:37

## 2024-04-22 RX ADMIN — PROPOFOL 50 MG: 10 INJECTION, EMULSION INTRAVENOUS at 09:17

## 2024-04-22 RX ADMIN — PROPOFOL 25 MG: 10 INJECTION, EMULSION INTRAVENOUS at 09:21

## 2024-04-22 ASSESSMENT — PAIN - FUNCTIONAL ASSESSMENT: PAIN_FUNCTIONAL_ASSESSMENT: 0-10

## 2024-04-22 NOTE — ANESTHESIA PRE PROCEDURE
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) 2021    right   • TIA (transient ischemic attack)     per patient       Past Surgical History:        Procedure Laterality Date   • APPENDECTOMY  1962   • BACK SURGERY  1996    PHLD; L4-L5   • CT BIOPSY LYMPH NODES SUPERFICIAL  04/11/2023    CT BIOPSY LYMPH NODES SUPERFICIAL 4/11/2023   • CT BIOPSY LYMPH NODES SUPERFICIAL  02/22/2022    CT BIOPSY LYMPH NODES SUPERFICIAL 2/22/2022   • GASTRIC BYPASS SURGERY  2009   • HYSTERECTOMY (CERVIX STATUS UNKNOWN)  1983    uterine cancer, stage 1   • LARYNGOSCOPY  07/22/2021    esophagoscopy, bronchoscopy   • LARYNGOSCOPY  01/06/2022    bronch   • LARYNGOSCOPY  02/24/2023   • SKIN GRAFT  01/06/2022    skin autograft   • ARCELIA AND BSO (CERVIX REMOVED)     • TOTAL KNEE ARTHROPLASTY  2005    right       Social History:    Social History     Tobacco Use   • Smoking status: Every Day     Current packs/day: 0.50     Types: Cigarettes   • Smokeless tobacco: Never   Substance Use Topics   • Alcohol use: Yes     Alcohol/week: 5.0 standard drinks of alcohol     Types: 5 Glasses of wine per week     Comment: 1/2 glass a day with dinner                                Ready to quit: Yes  Counseling given: Yes      Vital Signs (Current):   Vitals:    04/11/24 1526 04/22/24 0818 04/22/24 0831   BP:   (!) 149/71   Pulse:   63   Resp:   18   Temp:   98.1 °F (36.7 °C)   TempSrc:   Oral   SpO2:   96%   Weight: 71.7 kg (158 lb) 71.9 kg (158 lb 9.6 oz)    Height: 1.448 m (4' 9\") 1.448 m (4' 9\")

## 2024-04-22 NOTE — ANESTHESIA POSTPROCEDURE EVALUATION
Department of Anesthesiology  Postprocedure Note    Patient: Shi Nicole  MRN: [id number]  YOB: 1950  Date of evaluation: 4/22/2024    Procedure Summary       Date: 04/22/24 Room / Location: [facility] ENDO 02 / [facility] ENDOSCOPY    Anesthesia Start: 0907 Anesthesia Stop: 0944    Procedure: COLONOSCOPY DIAGNOSTIC w/Polypectomies (Abdomen) Diagnosis:       Gastroesophageal reflux disease, unspecified whether esophagitis present      Irritable bowel syndrome with diarrhea      Personal history of colonic polyps      Obesity, unspecified classification, unspecified obesity type, unspecified whether serious comorbidity present      (Gastroesophageal reflux disease, unspecified whether esophagitis present [K21.9])      (Irritable bowel syndrome with diarrhea [K58.0])      (Personal history of colonic polyps [Z86.010])      (Obesity, unspecified classification, unspecified obesity type, unspecified whether serious comorbidity present [E66.9])    Surgeons: Cesar Barrow MD Responsible Provider: Barb Cyr MD    Anesthesia Type: MAC ASA Status: 3            Anesthesia Type: MAC    Jorge Phase I: Jorge Score: 10    Jorge Phase II: Jorge Score: 10    Anesthesia Post Evaluation    Patient location during evaluation: bedside  Patient participation: complete - patient participated  Airway patency: patent  Cardiovascular status: hemodynamically stable  Respiratory status: acceptable  Hydration status: stable    No notable events documented.

## 2024-04-22 NOTE — H&P
judgement/insight: within normal limits. memory: within normal limits for recent and remote events. mood and affect: no evidence of depression, anxiety or agitation. orientation: oriented to time, space and person.        Basic Metabolic Profile   No results for input(s): \"NA\", \"K\", \"CL\", \"CO2\", \"BUN\", \"GLU\", \"CA\", \"MG\", \"PHOS\" in the last 72 hours.    Invalid input(s): \"CREAT\"      CBC w/Diff    No results for input(s): \"WBC\", \"RBC\", \"HGB\", \"HCT\", \"MCV\", \"MCH\", \"MCHC\", \"RDW\", \"PLT\", \"MPV\" in the last 72 hours. No results for input(s): \"MONO\", \"BASO\", \"PRO\", \"METAS\", \"BLAST\" in the last 72 hours.    Invalid input(s): \"GRANS\", \"LYMPH\", \"EOS\", \"MYELO\"     Hepatic Function   No results for input(s): \"ALB\", \"TP\", \"AML\" in the last 72 hours.    Invalid input(s): \"DBILI\", \"TBILI\", \"GPT\", \"SGOT\", \"AP\", \"LPSE\"       Cesar Barrow MD  [facility]  [facility]  [phone]  [email or web address]

## 2024-04-22 NOTE — BRIEF OP NOTE
[phone]    [facility]  [address] [place] [address]      Brief Procedure Note    Shi Nicole  1950  [id number]    Date of Procedure: 4/22/2024     Preoperative diagnosis: Gastroesophageal reflux disease, unspecified whether esophagitis present [K21.9]  Irritable bowel syndrome with diarrhea [K58.0]  Personal history of colonic polyps [Z86.010]  Obesity, unspecified classification, unspecified obesity type, unspecified whether serious comorbidity present [E66.9]    Postoperative diagnosis: Gastroesophageal reflux disease, unspecified whether esophagitis present [K21.9]  Irritable bowel syndrome with diarrhea [K58.0]  Personal history of colonic polyps [Z86.010]  Obesity, unspecified classification, unspecified obesity type, unspecified whether serious comorbidity present [E66.9]    Type of Anesthesia: MAC (Monitored anesthesia care)    Description of findings: same as post op dx    Procedure: Procedure(s):  COLONOSCOPY DIAGNOSTIC w/Polypectomies    :  Dr. Cesar Barrow MD    Assistant(s): Circulator: Willy Montes, NEREYDA; Cesilia Mcintosh, NEREYDA; Jeanine Cruz, NEREYDA    Devices/implants/grafts/tissues/prosthesis: None    EBL:None    Specimens:   ID Type Source Tests Collected by Time Destination   A : Transverse Polyps x3 Tissue Colon-Transverse SURGICAL PATHOLOGY Cesar Barrow MD 4/22/2024 0928        Findings: See printed and scanned procedure note    Complications: None    Dr. Cesar Barrow MD  4/22/2024  9:46 AM

## 2024-04-22 NOTE — DISCHARGE INSTRUCTIONS
Nurse     POST-PROCEDURE INSTRUCTIONS:    Call your Physician if you:  Observe any excess bleeding.  Develop a temperature over 100.5o F.  Experience abdominal, shoulder or chest pain.  Notice any signs of decreased circulation or nerve impairment to an extremity such as a change in color, persistent numbness, tingling, coldness or increase in pain.  Vomit blood or you have nausea and vomiting lasting longer than 4 hours.  Are unable to take medications.  Are unable to urinate within 8 hours after discharge following general anesthesia or intravenous sedation.    For the next 24 hours after receiving general anesthesia or intravenous sedation, or while taking prescription Narcotics, limit your activities:  Do NOT drive a motor vehicle, operate hazard machinery or power tools, or perform tasks that require coordination.  The medication you received during your procedure may have some effect on your mental awareness.  Do NOT make important personal or business decisions.  The medication you received during your procedure may have some effect on your mental awareness.  Do NOT drink alcoholic beverages.  These drinks do not mix well with the medications that have been given to you.    Upon discharge from the hospital, you must be accompanied by a responsible adult.    Resume your diet as directed by your physician.    Resume medications as your physician has prescribed.  Please give a list of your current medications to your Primary Care Provider.  Please update this list whenever your medications are discontinued, doses are changed, or new medications (including over-the-counter products) are added.  Please carry medication information at all times in case of emergency situations.          These are general instructions for a healthy lifestyle:    No smoking/ No tobacco products/ Avoid exposure to second hand smoke.  Surgeon General's Warning:  Quitting smoking now greatly reduces serious risk to your

## (undated) DEVICE — KIT CLN UP BON SECOURS MARYV

## (undated) DEVICE — ENDOSCOPY PUMP TUBING/ CAP SET: Brand: ERBE

## (undated) DEVICE — INTENDED FOR TISSUE SEPARATION, AND OTHER PROCEDURES THAT REQUIRE A SHARP SURGICAL BLADE TO PUNCTURE OR CUT.: Brand: BARD-PARKER SAFETY BLADES SIZE 15, STERILE

## (undated) DEVICE — PREP SKN CHLRAPRP APL 26ML STR --

## (undated) DEVICE — MEDI-VAC SUCTION HIGH CAPACITY: Brand: CARDINAL HEALTH

## (undated) DEVICE — SYRINGE MED 25GA 3ML L5/8IN SUBQ PLAS W/ DETACH NDL SFTY

## (undated) DEVICE — CURITY NON-ADHERENT STRIPS: Brand: CURITY

## (undated) DEVICE — SYRINGE MED 10ML LUERLOCK TIP W/O SFTY DISP

## (undated) DEVICE — MEDI-VAC NON-CONDUCTIVE SUCTION TUBING: Brand: CARDINAL HEALTH

## (undated) DEVICE — FLEX ADVANTAGE 3000CC: Brand: FLEX ADVANTAGE

## (undated) DEVICE — GARMENT,MEDLINE,DVT,SEQUENTIAL,CALF,MD: Brand: MEDLINE

## (undated) DEVICE — SYR 10ML LUER LOK 1/5ML GRAD --

## (undated) DEVICE — SOLUTION IRRIG 1000ML H2O STRL BLT

## (undated) DEVICE — UNDERPAD INCONT W23XL36IN STD BLU POLYPR BK FLUF SFT

## (undated) DEVICE — CANNULA ORIG TL CLR W FOAM CUSHIONS AND 14FT SUPL TB 3 CHN

## (undated) DEVICE — BLANKET WRM AD W50XL85.8IN PACU FULL BODY FORC AIR

## (undated) DEVICE — CANNULA NSL AD TBNG L14FT STD PVC O2 CRV CONN NONFLARED NSL

## (undated) DEVICE — SYRINGE MED 50ML LUERSLIP TIP

## (undated) DEVICE — DRAPE,HAND,STERILE: Brand: MEDLINE

## (undated) DEVICE — YANKAUER,SMOOTH HANDLE,HIGH CAPACITY: Brand: MEDLINE INDUSTRIES, INC.

## (undated) DEVICE — BASIN EMESIS 500CC ROSE 250/CS 60/PLT: Brand: MEDEGEN MEDICAL PRODUCTS, LLC

## (undated) DEVICE — BIPOLAR FORCEPS CORD: Brand: VALLEYLAB

## (undated) DEVICE — PADDING CAST COHESIVE 4 YDX3 IN HND TEARABLE COTTON SPEC 100

## (undated) DEVICE — GOWN,REINFORCE,POLY,SIRUS,SETSLV,XLARGE: Brand: MEDLINE

## (undated) DEVICE — SNARE POLYP M W27MMXL240CM OVL STIFF DISP CAPTIVATOR

## (undated) DEVICE — GAUZE,SPONGE,4"X4",16PLY,STRL,LF,10/TRAY: Brand: MEDLINE

## (undated) DEVICE — STERILE POLYISOPRENE POWDER-FREE SURGICAL GLOVES: Brand: PROTEXIS

## (undated) DEVICE — GAUZE,SPONGE,4"X4",12PLY,STERILE,LF,2'S: Brand: MEDLINE

## (undated) DEVICE — SYR 50ML SLIP TIP NSAF LF STRL --

## (undated) DEVICE — FLUFF AND POLYMER UNDERPAD,EXTRA HEAVY: Brand: WINGS

## (undated) DEVICE — SYRINGE 20ML LL S/C 50

## (undated) DEVICE — TRAP SPEC POLYP REM STRNR CLN DSGN MAGNIFYING WIND DISP

## (undated) DEVICE — GOWN PLASTIC FILM THMBHKS UNIV BLUE: Brand: CARDINAL HEALTH

## (undated) DEVICE — AIRLIFE™ NASAL OXYGEN CANNULA CURVED, FLARED TIP WITH 14 FOOT (4.3 M) CRUSH-RESISTANT TUBING, OVER-THE-EAR STYLE: Brand: AIRLIFE™

## (undated) DEVICE — BNDG CMPR ELAS KNT VEL STD 3IN -- MEDICHOICE

## (undated) DEVICE — CATHETER SUCT TR FL TIP 14FR W/ O CTRL

## (undated) DEVICE — BLADE OPHTH MINI BEAV SHRP --

## (undated) DEVICE — BITE BLOCK ENDOSCP UNIV AD 6 TO 9.4 MM

## (undated) DEVICE — PACK PROCEDURE SURG MAJ W/ BASIN LF

## (undated) DEVICE — DRAPE,REIN 53X77,STERILE: Brand: MEDLINE

## (undated) DEVICE — CANNULA PERF L2IN BLNT TIP 2MM VES CLR RADPQ BODY FEM LUER

## (undated) DEVICE — BANDAGE COMPR EXSANGUATION SGL LAYERED NO CLSR 9FT LEN 4IN W

## (undated) DEVICE — LINER SUCT CANSTR 3000CC PLAS SFT PRE ASSEMB W/OUT TBNG W/